# Patient Record
Sex: FEMALE | Race: OTHER | NOT HISPANIC OR LATINO | URBAN - METROPOLITAN AREA
[De-identification: names, ages, dates, MRNs, and addresses within clinical notes are randomized per-mention and may not be internally consistent; named-entity substitution may affect disease eponyms.]

---

## 2021-12-11 ENCOUNTER — INPATIENT (INPATIENT)
Facility: HOSPITAL | Age: 86
LOS: 2 days | Discharge: HOME | DRG: 687 | End: 2021-12-14
Attending: INTERNAL MEDICINE | Admitting: FAMILY MEDICINE
Payer: MEDICARE

## 2021-12-11 VITALS — WEIGHT: 139.99 LBS

## 2021-12-11 DIAGNOSIS — R31.9 HEMATURIA, UNSPECIFIED: ICD-10-CM

## 2021-12-11 LAB
ALBUMIN SERPL ELPH-MCNC: 3.3 G/DL — SIGNIFICANT CHANGE UP (ref 3.3–5)
ALP SERPL-CCNC: 54 U/L — SIGNIFICANT CHANGE UP (ref 40–120)
ALT FLD-CCNC: 34 U/L — SIGNIFICANT CHANGE UP (ref 12–78)
ANION GAP SERPL CALC-SCNC: 4 MMOL/L — LOW (ref 5–17)
APPEARANCE UR: ABNORMAL
AST SERPL-CCNC: 34 U/L — SIGNIFICANT CHANGE UP (ref 15–37)
BASOPHILS # BLD AUTO: 0.03 K/UL — SIGNIFICANT CHANGE UP (ref 0–0.2)
BASOPHILS # BLD AUTO: 0.04 K/UL — SIGNIFICANT CHANGE UP (ref 0–0.2)
BASOPHILS NFR BLD AUTO: 0.4 % — SIGNIFICANT CHANGE UP (ref 0–2)
BASOPHILS NFR BLD AUTO: 0.6 % — SIGNIFICANT CHANGE UP (ref 0–2)
BILIRUB SERPL-MCNC: 0.3 MG/DL — SIGNIFICANT CHANGE UP (ref 0.2–1.2)
BILIRUB UR-MCNC: NEGATIVE — SIGNIFICANT CHANGE UP
BUN SERPL-MCNC: 21 MG/DL — SIGNIFICANT CHANGE UP (ref 7–23)
CALCIUM SERPL-MCNC: 8.9 MG/DL — SIGNIFICANT CHANGE UP (ref 8.5–10.1)
CHLORIDE SERPL-SCNC: 110 MMOL/L — HIGH (ref 96–108)
CO2 SERPL-SCNC: 28 MMOL/L — SIGNIFICANT CHANGE UP (ref 22–31)
COLOR SPEC: ABNORMAL
CREAT SERPL-MCNC: 1.03 MG/DL — SIGNIFICANT CHANGE UP (ref 0.5–1.3)
DIFF PNL FLD: ABNORMAL
EOSINOPHIL # BLD AUTO: 0.06 K/UL — SIGNIFICANT CHANGE UP (ref 0–0.5)
EOSINOPHIL # BLD AUTO: 0.09 K/UL — SIGNIFICANT CHANGE UP (ref 0–0.5)
EOSINOPHIL NFR BLD AUTO: 0.8 % — SIGNIFICANT CHANGE UP (ref 0–6)
EOSINOPHIL NFR BLD AUTO: 1.3 % — SIGNIFICANT CHANGE UP (ref 0–6)
GLUCOSE SERPL-MCNC: 109 MG/DL — HIGH (ref 70–99)
GLUCOSE UR QL: NEGATIVE MG/DL — SIGNIFICANT CHANGE UP
HCT VFR BLD CALC: 23.6 % — LOW (ref 34.5–45)
HCT VFR BLD CALC: 26.3 % — LOW (ref 34.5–45)
HGB BLD-MCNC: 7.8 G/DL — LOW (ref 11.5–15.5)
HGB BLD-MCNC: 8.5 G/DL — LOW (ref 11.5–15.5)
IMM GRANULOCYTES NFR BLD AUTO: 0.4 % — SIGNIFICANT CHANGE UP (ref 0–1.5)
IMM GRANULOCYTES NFR BLD AUTO: 0.5 % — SIGNIFICANT CHANGE UP (ref 0–1.5)
KETONES UR-MCNC: ABNORMAL
LEUKOCYTE ESTERASE UR-ACNC: ABNORMAL
LYMPHOCYTES # BLD AUTO: 0.75 K/UL — LOW (ref 1–3.3)
LYMPHOCYTES # BLD AUTO: 1.29 K/UL — SIGNIFICANT CHANGE UP (ref 1–3.3)
LYMPHOCYTES # BLD AUTO: 10.8 % — LOW (ref 13–44)
LYMPHOCYTES # BLD AUTO: 17 % — SIGNIFICANT CHANGE UP (ref 13–44)
MCHC RBC-ENTMCNC: 30.1 PG — SIGNIFICANT CHANGE UP (ref 27–34)
MCHC RBC-ENTMCNC: 30.5 PG — SIGNIFICANT CHANGE UP (ref 27–34)
MCHC RBC-ENTMCNC: 32.3 GM/DL — SIGNIFICANT CHANGE UP (ref 32–36)
MCHC RBC-ENTMCNC: 33.1 GM/DL — SIGNIFICANT CHANGE UP (ref 32–36)
MCV RBC AUTO: 92.2 FL — SIGNIFICANT CHANGE UP (ref 80–100)
MCV RBC AUTO: 93.3 FL — SIGNIFICANT CHANGE UP (ref 80–100)
MONOCYTES # BLD AUTO: 0.54 K/UL — SIGNIFICANT CHANGE UP (ref 0–0.9)
MONOCYTES # BLD AUTO: 0.67 K/UL — SIGNIFICANT CHANGE UP (ref 0–0.9)
MONOCYTES NFR BLD AUTO: 7.1 % — SIGNIFICANT CHANGE UP (ref 2–14)
MONOCYTES NFR BLD AUTO: 9.7 % — SIGNIFICANT CHANGE UP (ref 2–14)
NEUTROPHILS # BLD AUTO: 5.35 K/UL — SIGNIFICANT CHANGE UP (ref 1.8–7.4)
NEUTROPHILS # BLD AUTO: 5.63 K/UL — SIGNIFICANT CHANGE UP (ref 1.8–7.4)
NEUTROPHILS NFR BLD AUTO: 74.2 % — SIGNIFICANT CHANGE UP (ref 43–77)
NEUTROPHILS NFR BLD AUTO: 77.2 % — HIGH (ref 43–77)
NITRITE UR-MCNC: NEGATIVE — SIGNIFICANT CHANGE UP
PH UR: 6.5 — SIGNIFICANT CHANGE UP (ref 5–8)
PLATELET # BLD AUTO: 365 K/UL — SIGNIFICANT CHANGE UP (ref 150–400)
PLATELET # BLD AUTO: 397 K/UL — SIGNIFICANT CHANGE UP (ref 150–400)
POTASSIUM SERPL-MCNC: 4.5 MMOL/L — SIGNIFICANT CHANGE UP (ref 3.5–5.3)
POTASSIUM SERPL-SCNC: 4.5 MMOL/L — SIGNIFICANT CHANGE UP (ref 3.5–5.3)
PROT SERPL-MCNC: 7 GM/DL — SIGNIFICANT CHANGE UP (ref 6–8.3)
PROT UR-MCNC: 500 MG/DL
RBC # BLD: 2.56 M/UL — LOW (ref 3.8–5.2)
RBC # BLD: 2.82 M/UL — LOW (ref 3.8–5.2)
RBC # FLD: 14.5 % — SIGNIFICANT CHANGE UP (ref 10.3–14.5)
RBC # FLD: 14.6 % — HIGH (ref 10.3–14.5)
SARS-COV-2 RNA SPEC QL NAA+PROBE: SIGNIFICANT CHANGE UP
SODIUM SERPL-SCNC: 142 MMOL/L — SIGNIFICANT CHANGE UP (ref 135–145)
SP GR SPEC: 1.01 — SIGNIFICANT CHANGE UP (ref 1.01–1.02)
UROBILINOGEN FLD QL: NEGATIVE MG/DL — SIGNIFICANT CHANGE UP
WBC # BLD: 6.93 K/UL — SIGNIFICANT CHANGE UP (ref 3.8–10.5)
WBC # BLD: 7.59 K/UL — SIGNIFICANT CHANGE UP (ref 3.8–10.5)
WBC # FLD AUTO: 6.93 K/UL — SIGNIFICANT CHANGE UP (ref 3.8–10.5)
WBC # FLD AUTO: 7.59 K/UL — SIGNIFICANT CHANGE UP (ref 3.8–10.5)

## 2021-12-11 PROCEDURE — 74177 CT ABD & PELVIS W/CONTRAST: CPT | Mod: 26,MA

## 2021-12-11 PROCEDURE — 36430 TRANSFUSION BLD/BLD COMPNT: CPT

## 2021-12-11 PROCEDURE — 85018 HEMOGLOBIN: CPT

## 2021-12-11 PROCEDURE — 85027 COMPLETE CBC AUTOMATED: CPT

## 2021-12-11 PROCEDURE — 85025 COMPLETE CBC W/AUTO DIFF WBC: CPT

## 2021-12-11 PROCEDURE — 84443 ASSAY THYROID STIM HORMONE: CPT

## 2021-12-11 PROCEDURE — 82746 ASSAY OF FOLIC ACID SERUM: CPT

## 2021-12-11 PROCEDURE — 82728 ASSAY OF FERRITIN: CPT

## 2021-12-11 PROCEDURE — 99285 EMERGENCY DEPT VISIT HI MDM: CPT

## 2021-12-11 PROCEDURE — 83550 IRON BINDING TEST: CPT

## 2021-12-11 PROCEDURE — 84436 ASSAY OF TOTAL THYROXINE: CPT

## 2021-12-11 PROCEDURE — 85014 HEMATOCRIT: CPT

## 2021-12-11 PROCEDURE — 80048 BASIC METABOLIC PNL TOTAL CA: CPT

## 2021-12-11 PROCEDURE — P9016: CPT

## 2021-12-11 PROCEDURE — 82607 VITAMIN B-12: CPT

## 2021-12-11 PROCEDURE — 83540 ASSAY OF IRON: CPT

## 2021-12-11 PROCEDURE — 99223 1ST HOSP IP/OBS HIGH 75: CPT

## 2021-12-11 PROCEDURE — 36415 COLL VENOUS BLD VENIPUNCTURE: CPT

## 2021-12-11 RX ORDER — ATORVASTATIN CALCIUM 80 MG/1
10 TABLET, FILM COATED ORAL AT BEDTIME
Refills: 0 | Status: DISCONTINUED | OUTPATIENT
Start: 2021-12-11 | End: 2021-12-14

## 2021-12-11 RX ORDER — SODIUM CHLORIDE 9 MG/ML
1000 INJECTION INTRAMUSCULAR; INTRAVENOUS; SUBCUTANEOUS ONCE
Refills: 0 | Status: COMPLETED | OUTPATIENT
Start: 2021-12-11 | End: 2021-12-11

## 2021-12-11 RX ORDER — LANOLIN ALCOHOL/MO/W.PET/CERES
3 CREAM (GRAM) TOPICAL AT BEDTIME
Refills: 0 | Status: DISCONTINUED | OUTPATIENT
Start: 2021-12-11 | End: 2021-12-14

## 2021-12-11 RX ORDER — ONDANSETRON 8 MG/1
4 TABLET, FILM COATED ORAL EVERY 8 HOURS
Refills: 0 | Status: DISCONTINUED | OUTPATIENT
Start: 2021-12-11 | End: 2021-12-14

## 2021-12-11 RX ORDER — ACETAMINOPHEN 500 MG
650 TABLET ORAL EVERY 6 HOURS
Refills: 0 | Status: DISCONTINUED | OUTPATIENT
Start: 2021-12-11 | End: 2021-12-14

## 2021-12-11 RX ADMIN — SODIUM CHLORIDE 1000 MILLILITER(S): 9 INJECTION INTRAMUSCULAR; INTRAVENOUS; SUBCUTANEOUS at 12:39

## 2021-12-11 RX ADMIN — SODIUM CHLORIDE 1000 MILLILITER(S): 9 INJECTION INTRAMUSCULAR; INTRAVENOUS; SUBCUTANEOUS at 11:38

## 2021-12-11 RX ADMIN — ATORVASTATIN CALCIUM 10 MILLIGRAM(S): 80 TABLET, FILM COATED ORAL at 21:34

## 2021-12-11 NOTE — ED STATDOCS - PROGRESS NOTE DETAILS
Justin Aldrich for Dr. Genao: 89 y/o F with a PMHx of HLD presents to the ED c/o hematuria x 9 days. Pt seen at walk in clinic 9 day sago and given Bactrim for UTI. Pt here c/o lower back pain and increased blood notes in urine with painful urination. Pt takes ASA. NO fever. No abd pain. Pt reports  L sided back pain. Pt also states that she feels very weak; here with a HR of 102.  Pt speaks Palauan; pt's son with her to help translate. Will send pt to main ED for further evaluation. Justin Aldrich for Dr. Genao: 89 y/o F with a PMHx of HLD presents to the ED c/o hematuria x 9 days. Pt seen at walk in clinic 9 day sago and given Bactrim for UTI. Pt here c/o lower back pain and increased blood notes in urine with painful urination. Pt takes ASA. NO fever. No abd pain. Pt reports  L sided back pain. Pt also states that she feels very weak; here with a HR of 102. Pt is pale.  Pt speaks American; pt's son with her to help translate. Will send pt to main ED for further evaluation.

## 2021-12-11 NOTE — H&P ADULT - ASSESSMENT
89 yo female with Hx. of HLD, Macular degeneration who presented in the ED for gross hematuria. The patient symptoms started 9 days ago and she was treated with Bactrim. The patient symptoms got worse. The patient moved up from [place] 2 years ago to live with her son because of COVID.  Family also noted a bruise on her forehead 2 days ago.    assessment Dx:                       1. Gross  hematuria                        2. Anemia secondary to acute blood loss                        3. Bladder neoplasm                        4. Macular degeneration                       5. HLD     Plan:    admit to medicine               medications: as per med rec. Received blood transfusion in ED                VTEP: Venodynes               Labs: cbc, bmp ordered for the AM                Radiology: CT abd /pelvis done see result               Cardiac diagnostics: EKG               Consults: Urology                 Advance Directive: full code,

## 2021-12-11 NOTE — ED PROVIDER NOTE - RESPIRATORY, MLM
Aaliyah is asked if she feels patient is a danger to herself being at home alone. Per Aaliyah, no, she doesn't feel it has come to that at this point and she declined off for information for APS referral.  
Per Niece by marriage, hermelindo, patient leaves the house and returns and doesn't remember where she went. She will say \"i don't remember where I went, but I remember how to get there.\"    She is having trouble remembering the names of places. This started 1 year ago. She avoids the family when they bring up concerns. She doesn't see them for long periods of time so they can't notice the gradual change. Now when they see her after a while big changes are noted.     Her friends have been calling Hermelindo and Lan letting them know that patient is acting different and they are concerned. The patient herself, stated several times that she doesn't feel herself, yet she has been hesitant to set up an appointment.       Lan her nephew brought her to  to draw up papers for if she is deemed incompetent. They will bring the papers to the visit to place in her chart.    She tells her family she doesn't take any medications. She has been driving.       Hermelindo will have patient sign oral disclosure at visit so she is added on for future communication with office. Note placed on appt for MD to review this message before the appt.            
Please call Aaliyah regarding an upcoming appointment for Daria.  She wanted to relay some information to you regarding some memory issues patient is having and how they are concerned for her safety.  Her  Lan will be on the appointment with Daria on the 19th  #[phone]  
Breath sounds clear and equal bilaterally.

## 2021-12-11 NOTE — ED PROVIDER NOTE - OBJECTIVE STATEMENT
89 y/o female with a PMHx of HLD presents to the ED c/o hematuria x9 days. Pt seen at walk in clinic 9 days ago and given Bactrim for UTI. Pt with increasing hematuria, burning with urination, abd cramping and left flank pain. Pt takes ASA. Denies fever, black stools. No other complaints at this time. Pt speaks Swedish; pt's son with her to help translate. 89 y/o female with a PMHx of HLD presents to the ED c/o hematuria x9 days. Pt seen at walk in clinic 9 days ago and given Bactrim for UTI. Pt with increasing hematuria, burning with urination, abd cramping and left flank pain. Pt takes ASA. Denies fever. No other complaints at this time. Pt speaks Jordanian; pt's son with her to help translate. 89 y/o female with a PMHx of HLD presents to the ED c/o hematuria x9 days. Pt seen at walk in clinic 9 days ago and given Bactrim for UTI. Pt with increasing hematuria, burning with urination, abd cramping and left flank pain. Pt takes ASA. Denies fever. No other complaints at this time. Pt speaks Macedonian; pt's son with her to help translate as requested by patient.

## 2021-12-11 NOTE — H&P ADULT - HISTORY OF PRESENT ILLNESS
HPI: The patient is a 89 yo female with Hx. of HLD, Macular degeneration who presented in the ED for gross hematuria. The patient symptoms started 9 days ago and she was treated with Bactrim. The patient symptoms got worse. The patient moved up from [place] 2 years ago to live with her son because of COVID.  Family also noted a bruise on her forehead 2 days ago.     PMHx:  HLD, Macular degeneration     PSHx: denies surgeries    Family Hx: both parents lived ot [address],     Social Hx.: not smoking, no alcohol use    ROS:   Eyes: no changes in vision    ENT/Mouth: no changes    Cardiovascular: no chest pain    Respiratory: no SOB    Gastrointestinal: no diarrhea, no nausea, no vomiting    Genitourinary: no dysuria    Breast: no pain    Musculoskeletal: no pain    Integumentary: no itching    Neurological: No Headache, no tremor,    Psychiatric: no suicidal ideations    Endocrine: no excessive thirst,     Hematologic/Lymphatic: no swollen glands    Allergic/Immunologic: no itching      Physical Exam: Vital Signs Last 24 Hrs  T(C): 36.7 (11 Dec 2021 19:28), Max: 36.8 (11 Dec 2021 10:48)  T(F): 98 (11 Dec 2021 19:28), Max: 98.2 (11 Dec 2021 10:48)  HR: 76 (11 Dec 2021 19:28) (76 - 102)  BP: 133/63 (11 Dec 2021 19:28) (121/66 - 135/60)  BP(mean): 75 (11 Dec 2021 16:15) (75 - 83)  RR: 20 (11 Dec 2021 19:28) (14 - 20)  SpO2: 99% (11 Dec 2021 19:28) (97% - 100%)        HEENT: PRRL EOMI    MOUTH/TEETH/GUMS: Clear    NECK: no JVD    LUNGS: Clear    HEART: S1,S2 RR    ABDOMEN: soft nontender    EXTREMITIES:  no pedal edema    MUSCULOSKELETAL: no joint swelling     NEURO: no tremor, no focal signs.    SKIN: no rash, small fading ecchymosis on forehead.     : CVA negative,     Lab:                       8.5    6.93  )-----------( 397      ( 11 Dec 2021 11:09 )             26.3       12-11    142  |  110<H>  |  21  ----------------------------<  109<H>  4.5   |  28  |  1.03    Ca    8.9      11 Dec 2021 11:09    TPro  7.0  /  Alb  3.3  /  TBili  0.3  /  DBili  x   /  AST  34  /  ALT  34  /  AlkPhos  54  12-11      CT abd/pelvis 2 polypoid bladder masses suspicious for multifocal bladder neoplasm

## 2021-12-11 NOTE — ED ADULT NURSE NOTE - CHIEF COMPLAINT QUOTE
Hematuria x9 days. Seen at a walk in clinic 9 days ago and given Bactrim for UTI. Now complaining of lower back pain and increased blood noted in urine with painful urination. Patient speaks primarily Maltese, emergency contact are her sons Errol [phone] and Fei [phone].

## 2021-12-11 NOTE — ED ADULT TRIAGE NOTE - CHIEF COMPLAINT QUOTE
Hematuria x9 days. Seen at a walk in clinic 9 days ago and given Bactrim for UTI. Now complaining of lower back pain and increased blood noted in urine with painful urination. Patient speaks primarily Arabic, emergency contact are her sons Errol [phone] and Fei [phone].

## 2021-12-11 NOTE — ED PROVIDER NOTE - PROGRESS NOTE DETAILS
Justin Case for attending Dr. Peña: Pt with a hemoglobin of 8.5. Pt with no old for comparison. Pending workup. Pt will likely need to be admitted. Justin Case for attending Dr. Peña: Spoke with urology. Aware of pt and will consult. Pt will be admitted to medicine for anemia. Will transfuse 1 unit of PRBCs. Pt and son at bedside agree with plan. Mariana SAN: all results d/w patient and son at bedside and copy of all labs, ct scan provided. Endorsed to Dr. Fernandes for admission.

## 2021-12-12 LAB
ANION GAP SERPL CALC-SCNC: 3 MMOL/L — LOW (ref 5–17)
BUN SERPL-MCNC: 15 MG/DL — SIGNIFICANT CHANGE UP (ref 7–23)
CALCIUM SERPL-MCNC: 8.5 MG/DL — SIGNIFICANT CHANGE UP (ref 8.5–10.1)
CHLORIDE SERPL-SCNC: 110 MMOL/L — HIGH (ref 96–108)
CO2 SERPL-SCNC: 28 MMOL/L — SIGNIFICANT CHANGE UP (ref 22–31)
CREAT SERPL-MCNC: 0.76 MG/DL — SIGNIFICANT CHANGE UP (ref 0.5–1.3)
GLUCOSE SERPL-MCNC: 120 MG/DL — HIGH (ref 70–99)
HCT VFR BLD CALC: 27.9 % — LOW (ref 34.5–45)
HGB BLD-MCNC: 9.2 G/DL — LOW (ref 11.5–15.5)
MCHC RBC-ENTMCNC: 30 PG — SIGNIFICANT CHANGE UP (ref 27–34)
MCHC RBC-ENTMCNC: 33 GM/DL — SIGNIFICANT CHANGE UP (ref 32–36)
MCV RBC AUTO: 90.9 FL — SIGNIFICANT CHANGE UP (ref 80–100)
PLATELET # BLD AUTO: 332 K/UL — SIGNIFICANT CHANGE UP (ref 150–400)
POTASSIUM SERPL-MCNC: 3.8 MMOL/L — SIGNIFICANT CHANGE UP (ref 3.5–5.3)
POTASSIUM SERPL-SCNC: 3.8 MMOL/L — SIGNIFICANT CHANGE UP (ref 3.5–5.3)
RBC # BLD: 3.07 M/UL — LOW (ref 3.8–5.2)
RBC # FLD: 15.9 % — HIGH (ref 10.3–14.5)
SODIUM SERPL-SCNC: 141 MMOL/L — SIGNIFICANT CHANGE UP (ref 135–145)
WBC # BLD: 6.67 K/UL — SIGNIFICANT CHANGE UP (ref 3.8–10.5)
WBC # FLD AUTO: 6.67 K/UL — SIGNIFICANT CHANGE UP (ref 3.8–10.5)

## 2021-12-12 PROCEDURE — 99232 SBSQ HOSP IP/OBS MODERATE 35: CPT

## 2021-12-12 PROCEDURE — 99222 1ST HOSP IP/OBS MODERATE 55: CPT

## 2021-12-12 RX ORDER — SODIUM CHLORIDE 9 MG/ML
500 INJECTION INTRAMUSCULAR; INTRAVENOUS; SUBCUTANEOUS ONCE
Refills: 0 | Status: COMPLETED | OUTPATIENT
Start: 2021-12-12 | End: 2021-12-12

## 2021-12-12 RX ADMIN — SODIUM CHLORIDE 500 MILLILITER(S): 9 INJECTION INTRAMUSCULAR; INTRAVENOUS; SUBCUTANEOUS at 16:21

## 2021-12-12 NOTE — PATIENT PROFILE ADULT - PATIENT/CAREGIVER ACCEPTED INTERPRETER SERVICES
yes Pt. unable to understand  in Libyan or Cook Islander  [id number]. RN speaking limited Libyan with patient./yes

## 2021-12-12 NOTE — CONSULT NOTE ADULT - SUBJECTIVE AND OBJECTIVE BOX
hpHPI: The patient is a 89 yo female with Hx. of HLD, Macular degeneration who presented in the ED for gross hematuria. The patient symptoms started 9 days ago and she was treated with Bactrim. The patient symptoms got worse. The patient moved up from [place] 2 years ago to live with her son because of COVID.  Family also noted a bruise on her forehead 2 days ago.     PMHx:  HLD, Macular degeneration     PSHx: denies surgeries    Family Hx: both parents lived ot [address],     Social Hx.: not smoking, no alcohol use    ROS: negative for all except what is noted in HPI    Vital Signs Last 24 Hrs  T(C): 35.9 (12 Dec 2021 08:10), Max: 36.8 (11 Dec 2021 23:46)  T(F): 96.7 (12 Dec 2021 08:10), Max: 98.2 (11 Dec 2021 23:46)  HR: 87 (12 Dec 2021 08:10) (72 - 104)  BP: 112/53 (12 Dec 2021 08:10) (112/53 - 135/60)  BP(mean): 84 (11 Dec 2021 23:46) (75 - 84)  RR: 18 (12 Dec 2021 08:10) (14 - 20)  SpO2: 96% (12 Dec 2021 08:10) (94% - 100%)    NAD  RRR  no increased WOB  ABD S NT ND  no SP tenderness  no CVA tenderness                          9.2    6.67  )-----------( 332      ( 12 Dec 2021 10:32 )             27.9     12-12    141  |  110<H>  |  15  ----------------------------<  120<H>  3.8   |  28  |  0.76    Ca    8.5      12 Dec 2021 10:32    TPro  7.0  /  Alb  3.3  /  TBili  0.3  /  DBili  x   /  AST  34  /  ALT  34  /  AlkPhos  54  12-11    Urinalysis Basic - ( 11 Dec 2021 11:09 )    Color: Red / Appearance: Slightly Turbid / S.010 / pH: x  Gluc: x / Ketone: Trace  / Bili: Negative / Urobili: Negative mg/dL   Blood: x / Protein: 500 mg/dL / Nitrite: Negative   Leuk Esterase: Trace / RBC: >50 /HPF / WBC 0-2   Sq Epi: x / Non Sq Epi: Negative / Bacteria: Negative    < from: CT Abdomen and Pelvis w/ IV Cont (21 @ 12:59) >  BLADDER: 2 polypoid soft tissue nodules;  *A nodule along the left posterior wall measures 3.7 x 2.7 x 2.0 cm  *A nodule at the base of the bladder measures 1.2 x 1.4 x 1.8 cm.  Bilateral bladder diverticula noted.    < end of copied text >

## 2021-12-12 NOTE — PROGRESS NOTE ADULT - ASSESSMENT
ASSESSMENT & PLAN    #Gross Hematuria  #Bladder Mass x 2 of uncertain Behavior + Bladder diverticuli  #Acute blood loss anemia due to above (possible nutritional component too)  -Admit to medicine  -Hgb low. Has not required transfusion. Hgb now stable. Continue to monitor  -Iron/Ferriting/B12/Folate level  -CT with bladder mass x 2 NOS.   -Urology consult  -IVF as needed for fluid balance    #Possible OSORIO vs CKD II/IIIA.   -Cr on admission 1.01 improved to 0.76 with IVF  -Continue to monitor Cr and Electrolytes  -IVF as needed for fluid balance    #HLD. Continue Statin    DVT Prophylaxis: SCDs because acute bleed + anemia ASSESSMENT & PLAN    #Gross Hematuria  #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 s/p 1 unit of pRBCs 12/11. Hgb now stable. Continue to monitor  -Iron/Ferriting/B12/Folate level  -CT with bladder mass x 2 NOS.   -Urology consult  -IVF as needed for fluid balance    #Possible OSORIO vs CKD II/IIIA.   -Cr on admission 1.01 improved to 0.76 with IVF  -Continue to monitor Cr and Electrolytes  -IVF as needed for fluid balance    #HLD. Continue Statin    DVT Prophylaxis: SCDs because acute bleed + anemia

## 2021-12-12 NOTE — CONSULT NOTE ADULT - ASSESSMENT
89 yo F with gross hematuria, suspected bladder masses on CT. Call placed to discuss GOC with son Fei. message left. For now, trend CBC. Will consider CBI if H/H drifts back down.

## 2021-12-12 NOTE — PATIENT PROFILE ADULT - FALL HARM RISK - HARM RISK INTERVENTIONS
Assistance with ambulation/Assistance OOB with selected safe patient handling equipment/Communicate Risk of Fall with Harm to all staff/Monitor gait and stability/Reinforce activity limits and safety measures with patient and family/Sit up slowly, dangle for a short time, stand at bedside before walking/Tailored Fall Risk Interventions/Visual Cue: Yellow wristband and red socks/Bed in lowest position, wheels locked, appropriate side rails in place/Call bell, personal items and telephone in reach/Instruct patient to call for assistance before getting out of bed or chair/Non-slip footwear when patient is out of bed/[place] to call system/Physically safe environment - no spills, clutter or unnecessary equipment/Purposeful Proactive Rounding/Room/bathroom lighting operational, light cord in reach

## 2021-12-12 NOTE — PROGRESS NOTE ADULT - SUBJECTIVE AND OBJECTIVE BOX
CHIEF COMPLAINT: Hematuria    SUBJECTIVE: Hematuria improved. No pain or dysuria. Denies fever, chills, chest pain, nausea, vomiting, flank pain,.     SIGNIFICANT INTERVAL EVENTS/OVERNIGHT EVENTS: None    Review of Systems: 14 Point review of systems reviewed and reported as negative unless otherwise stated in HPI    Main language is Iraqi. Patient able to answer simple questions about symptoms. Translation and additional history obtained with sons at bedside.     FROM H&P:  "The patient is a 89 yo female with Hx. of HLD, Macular degeneration who presented in the ED for gross hematuria. The patient symptoms started 9 days ago and she was treated with Bactrim. The patient symptoms got worse. The patient moved up from [place] 2 years ago to live with her son because of COVID.  Family also noted a bruise on her forehead 2 days ago. "    PHYSICAL EXAM:    T(C): 35.9 (12-12-21 @ 08:10), Max: 36.8 (12-11-21 @ 23:46)  HR: 87 (12-12-21 @ 08:10) (72 - 104)  BP: 112/53 (12-12-21 @ 08:10) (112/53 - 135/60)  RR: 18 (12-12-21 @ 08:10) (14 - 20)  SpO2: 96% (12-12-21 @ 08:10) (94% - 100%)    General: AAOx3; NAD; Frail appearing  Head: AT/NC  ENT: Moist Mucous Membranes; No Injury  Eyes: EOMI; PERRL  Neck: Non-tender; No JVD  CVS: RRR, S1&S2, No murmur, No edema  Respiratory: Lungs CTA B/L; Normal Respiratory Effort  Abdomen/GI: Soft, non-tender, non-distended, no guarding, no rebound, normal bowel sounds  : No bladder distention  Extremites: No cyanosis, No clubbing, No edema  MSK: No CVA tenderness, Normal ROM, No injury  Neuro: AAOx3, CNII-XII grossly intact, non-focal  Psych: Appropriate, Cooperative,  Skin: Clean, Dry and Intact      LABS:                          9.2    6.67  )-----------( 332      ( 12 Dec 2021 10:32 )             27.9     12-12    141  |  110<H>  |  15  ----------------------------<  120<H>  3.8   |  28  |  0.76    Ca    8.5      12 Dec 2021 10:32    TPro  7.0  /  Alb  3.3  /  TBili  0.3  /  DBili  x   /  AST  34  /  ALT  34  /  AlkPhos  54  12-11    COVID-19 PCR: NotDetec (11 Dec 2021 11:09)    CAPILLARY BLOOD GLUCOSE    INR: 0.98:Urinalysis (12.11.21 @ 11:09)   Glucose Qualitative, Urine: Negative mg/dL   Blood, Urine: Large   pH Urine: 6.5   Color: Red   Urine Appearance: Slightly Turbid   Bilirubin: Negative   Ketone - Urine: Trace   Specific Gravity: 1.010   Protein, Urine: 500 mg/dL   Urobilinogen: Negative mg/dL   Nitrite: Negative   Leukocyte Esterase Concentration: Trace           RADIOLOGY:  < from: CT Abdomen and Pelvis w/ IV Cont (12.11.21 @ 12:59) >  BLADDER: 2 polypoid soft tissue nodules;  *A nodule along the left posterior wall measures 3.7 x 2.7 x 2.0 cm  *A nodule at the base of the bladder measures 1.2 x 1.4 x 1.8 cm.  Bilateral bladder diverticula noted.  REPRODUCTIVE ORGANS: Uterus and adnexa within normal limits.    < end of copied text >            I personally reviewed labs, imaging, orders and vitals.    Discussed case with:  [X]RN  [X][place]/JERARDO  [X]Patient  [X]Family  []Specialist:        MEDICATIONS  (STANDING):  atorvastatin 10 milliGRAM(s) Oral at bedtime    MEDICATIONS  (PRN):  acetaminophen     Tablet .. 650 milliGRAM(s) Oral every 6 hours PRN Temp greater or equal to 38C (100.4F), Mild Pain (1 - 3)  aluminum hydroxide/magnesium hydroxide/simethicone Suspension 30 milliLiter(s) Oral every 4 hours PRN Dyspepsia  melatonin 3 milliGRAM(s) Oral at bedtime PRN Insomnia  ondansetron Injectable 4 milliGRAM(s) IV Push every 8 hours PRN Nausea and/or Vomiting

## 2021-12-13 LAB
ANION GAP SERPL CALC-SCNC: 5 MMOL/L — SIGNIFICANT CHANGE UP (ref 5–17)
BASOPHILS # BLD AUTO: 0.04 K/UL — SIGNIFICANT CHANGE UP (ref 0–0.2)
BASOPHILS NFR BLD AUTO: 0.5 % — SIGNIFICANT CHANGE UP (ref 0–2)
BUN SERPL-MCNC: 19 MG/DL — SIGNIFICANT CHANGE UP (ref 7–23)
CALCIUM SERPL-MCNC: 8.6 MG/DL — SIGNIFICANT CHANGE UP (ref 8.5–10.1)
CHLORIDE SERPL-SCNC: 113 MMOL/L — HIGH (ref 96–108)
CO2 SERPL-SCNC: 26 MMOL/L — SIGNIFICANT CHANGE UP (ref 22–31)
CREAT SERPL-MCNC: 0.63 MG/DL — SIGNIFICANT CHANGE UP (ref 0.5–1.3)
CULTURE RESULTS: SIGNIFICANT CHANGE UP
EOSINOPHIL # BLD AUTO: 0.06 K/UL — SIGNIFICANT CHANGE UP (ref 0–0.5)
EOSINOPHIL NFR BLD AUTO: 0.8 % — SIGNIFICANT CHANGE UP (ref 0–6)
GLUCOSE SERPL-MCNC: 104 MG/DL — HIGH (ref 70–99)
HCT VFR BLD CALC: 28.7 % — LOW (ref 34.5–45)
HGB BLD-MCNC: 9.2 G/DL — LOW (ref 11.5–15.5)
IMM GRANULOCYTES NFR BLD AUTO: 0.5 % — SIGNIFICANT CHANGE UP (ref 0–1.5)
LYMPHOCYTES # BLD AUTO: 1.13 K/UL — SIGNIFICANT CHANGE UP (ref 1–3.3)
LYMPHOCYTES # BLD AUTO: 15 % — SIGNIFICANT CHANGE UP (ref 13–44)
MCHC RBC-ENTMCNC: 29.3 PG — SIGNIFICANT CHANGE UP (ref 27–34)
MCHC RBC-ENTMCNC: 32.1 GM/DL — SIGNIFICANT CHANGE UP (ref 32–36)
MCV RBC AUTO: 91.4 FL — SIGNIFICANT CHANGE UP (ref 80–100)
MONOCYTES # BLD AUTO: 0.79 K/UL — SIGNIFICANT CHANGE UP (ref 0–0.9)
MONOCYTES NFR BLD AUTO: 10.5 % — SIGNIFICANT CHANGE UP (ref 2–14)
NEUTROPHILS # BLD AUTO: 5.46 K/UL — SIGNIFICANT CHANGE UP (ref 1.8–7.4)
NEUTROPHILS NFR BLD AUTO: 72.7 % — SIGNIFICANT CHANGE UP (ref 43–77)
PLATELET # BLD AUTO: 330 K/UL — SIGNIFICANT CHANGE UP (ref 150–400)
POTASSIUM SERPL-MCNC: 4.2 MMOL/L — SIGNIFICANT CHANGE UP (ref 3.5–5.3)
POTASSIUM SERPL-SCNC: 4.2 MMOL/L — SIGNIFICANT CHANGE UP (ref 3.5–5.3)
RBC # BLD: 3.14 M/UL — LOW (ref 3.8–5.2)
RBC # FLD: 15.7 % — HIGH (ref 10.3–14.5)
SODIUM SERPL-SCNC: 144 MMOL/L — SIGNIFICANT CHANGE UP (ref 135–145)
SPECIMEN SOURCE: SIGNIFICANT CHANGE UP
T4 AB SER-ACNC: 5.4 UG/DL — SIGNIFICANT CHANGE UP (ref 4.6–12)
WBC # BLD: 7.52 K/UL — SIGNIFICANT CHANGE UP (ref 3.8–10.5)
WBC # FLD AUTO: 7.52 K/UL — SIGNIFICANT CHANGE UP (ref 3.8–10.5)

## 2021-12-13 PROCEDURE — 99232 SBSQ HOSP IP/OBS MODERATE 35: CPT

## 2021-12-13 RX ORDER — LEVOTHYROXINE SODIUM 125 MCG
100 TABLET ORAL DAILY
Refills: 0 | Status: DISCONTINUED | OUTPATIENT
Start: 2021-12-13 | End: 2021-12-14

## 2021-12-13 NOTE — PROGRESS NOTE ADULT - SUBJECTIVE AND OBJECTIVE BOX
Cheif complaints and Diagnosis: bladder mass/ gross hematuria    Subjective: no complaints      REVIEW OF SYSTEMS:    CONSTITUTIONAL: No weakness, fevers or chills  EYES/ENT: No visual changes;  No vertigo or throat pain   NECK: No pain or stiffness  RESPIRATORY: No cough, wheezing, hemoptysis; No shortness of breath  CARDIOVASCULAR: No chest pain or palpitations  GASTROINTESTINAL: No abdominal or epigastric pain. No nausea, vomiting, or hematemesis; No diarrhea or constipation. No melena or hematochezia.  GENITOURINARY: No dysuria, frequency or hematuria  NEUROLOGICAL: No numbness or weakness  SKIN: No itching, burning, rashes, or lesions   All other review of systems is negative unless indicated above      Vital Signs Last 24 Hrs  T(C): 36.4 (13 Dec 2021 08:46), Max: 36.4 (12 Dec 2021 15:39)  T(F): 97.5 (13 Dec 2021 08:46), Max: 97.6 (12 Dec 2021 15:39)  HR: 109 (13 Dec 2021 08:46) (88 - 109)  BP: 121/68 (13 Dec 2021 08:46) (99/58 - 164/97)  BP(mean): --  RR: 17 (13 Dec 2021 08:46) (17 - 17)  SpO2: 96% (13 Dec 2021 08:46) (93% - 100%)    HEENT:   pupils equal and reactive, EOMI, no oropharyngeal lesions, erythema, exudates, oral thrush    NECK:   supple, no carotid bruits, no palpable lymph nodes, no thyromegaly    CV:  +S1, +S2, regular, no murmurs or rubs    RESP:   lungs clear to auscultation bilaterally, no wheezing, rales, rhonchi, good air entry bilaterally    BREAST:  not examined    GI:  abdomen soft, non-tender, non-distended, normal BS, no bruits, no abdominal masses, no palpable masses    RECTAL:  not examined    :  not examined    MSK:   normal muscle tone, no atrophy, no rigidity, no contractions    EXT:   no clubbing, no cyanosis, no edema, no calf pain, swelling or erythema    VASCULAR:  pulses equal and symmetric in the upper and lower extremities    NEURO:  AAOX3, no focal neurological deficits, follows all commands, able to move extremities spontaneously    SKIN:  no ulcers, lesions or rashes    MEDICATIONS  (STANDING):  atorvastatin 10 milliGRAM(s) Oral at bedtime    MEDICATIONS  (PRN):  acetaminophen     Tablet .. 650 milliGRAM(s) Oral every 6 hours PRN Temp greater or equal to 38C (100.4F), Mild Pain (1 - 3)  aluminum hydroxide/magnesium hydroxide/simethicone Suspension 30 milliLiter(s) Oral every 4 hours PRN Dyspepsia  melatonin 3 milliGRAM(s) Oral at bedtime PRN Insomnia  ondansetron Injectable 4 milliGRAM(s) IV Push every 8 hours PRN Nausea and/or Vomiting      Urinalysis Basic - ( 11 Dec 2021 11:09 )    Color: Red / Appearance: Slightly Turbid / S.010 / pH: x  Gluc: x / Ketone: Trace  / Bili: Negative / Urobili: Negative mg/dL   Blood: x / Protein: 500 mg/dL / Nitrite: Negative   Leuk Esterase: Trace / RBC: >50 /HPF / WBC 0-2   Sq Epi: x / Non Sq Epi: Negative / Bacteria: Negative    12 Dec 2021 10:32    141    |  110    |  15     ----------------------------<  120    3.8     |  28     |  0.76     Ca    8.5        12 Dec 2021 10:32    TPro  7.0    /  Alb  3.3    /  TBili  0.3    /  DBili  x      /  AST  34     /  ALT  34     /  AlkPhos  54     11 Dec 2021 11:09  LIVER FUNCTIONS - ( 11 Dec 2021 11:09 )  Alb: 3.3 g/dL / Pro: 7.0 gm/dL / ALK PHOS: 54 U/L / ALT: 34 U/L / AST: 34 U/L / GGT: x         PT/INR - ( 11 Dec 2021 12:33 )   PT: 11.5 sec;   INR: 0.98 ratio         PTT - ( 11 Dec 2021 12:33 )  PTT:29.5 secCBC Full  -  ( 13 Dec 2021 09:30 )  WBC Count : 7.52 K/uL  Hemoglobin : 9.2 g/dL  Hematocrit : 28.7 %  Platelet Count - Automated : 330 K/uL  Mean Cell Volume : 91.4 fl  Mean Cell Hemoglobin : 29.3 pg  Mean Cell Hemoglobin Concentration : 32.1 gm/dL  Auto Neutrophil # : 5.46 K/uL  Auto Lymphocyte # : 1.13 K/uL  Auto Monocyte # : 0.79 K/uL  Auto Eosinophil # : 0.06 K/uL  Auto Basophil # : 0.04 K/uL  Auto Neutrophil % : 72.7 %  Auto Lymphocyte % : 15.0 %  Auto Monocyte % : 10.5 %  Auto Eosinophil % : 0.8 %  Auto Basophil % : 0.5 %            PT/INR - ( 11 Dec 2021 12:33 )   PT: 11.5 sec;   INR: 0.98 ratio         PTT - ( 11 Dec 2021 12:33 )  PTT:29.5 sec              Assessment and Plan:      #Gross Hematuria  #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 s/p 1 unit of pRBCs . Hgb now stable. Continue to monitor  -Iron/Ferriting/B12/Folate level  -CT with bladder mass x 2 NOS.   -Urology consult appreciated; Miranda mays left message for son to discuss plan of action.   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   -Cr on admission 1.01 improved to 0.76 with IVF  -Continue to monitor Cr and Electrolytes  -IVF as needed for fluid balance    #HLD. Continue Statin    DVT Prophylaxis: SCDs because acute bleed + anemia       Cheif complaints and Diagnosis: bladder mass/ gross hematuria    Subjective: no complaints      REVIEW OF SYSTEMS:    CONSTITUTIONAL: No weakness, fevers or chills  EYES/ENT: No visual changes;  No vertigo or throat pain   NECK: No pain or stiffness  RESPIRATORY: No cough, wheezing, hemoptysis; No shortness of breath  CARDIOVASCULAR: No chest pain or palpitations  GASTROINTESTINAL: No abdominal or epigastric pain. No nausea, vomiting, or hematemesis; No diarrhea or constipation. No melena or hematochezia.  GENITOURINARY: No dysuria, frequency or hematuria  NEUROLOGICAL: No numbness or weakness  SKIN: No itching, burning, rashes, or lesions   All other review of systems is negative unless indicated above      Vital Signs Last 24 Hrs  T(C): 36.4 (13 Dec 2021 08:46), Max: 36.4 (12 Dec 2021 15:39)  T(F): 97.5 (13 Dec 2021 08:46), Max: 97.6 (12 Dec 2021 15:39)  HR: 109 (13 Dec 2021 08:46) (88 - 109)  BP: 121/68 (13 Dec 2021 08:46) (99/58 - 164/97)  BP(mean): --  RR: 17 (13 Dec 2021 08:46) (17 - 17)  SpO2: 96% (13 Dec 2021 08:46) (93% - 100%)    HEENT:   pupils equal and reactive, EOMI, no oropharyngeal lesions, erythema, exudates, oral thrush    NECK:   supple, no carotid bruits, no palpable lymph nodes, no thyromegaly    CV:  +S1, +S2, regular, no murmurs or rubs    RESP:   lungs clear to auscultation bilaterally, no wheezing, rales, rhonchi, good air entry bilaterally    BREAST:  not examined    GI:  abdomen soft, non-tender, non-distended, normal BS, no bruits, no abdominal masses, no palpable masses    RECTAL:  not examined    :  not examined    MSK:   normal muscle tone, no atrophy, no rigidity, no contractions    EXT:   no clubbing, no cyanosis, no edema, no calf pain, swelling or erythema    VASCULAR:  pulses equal and symmetric in the upper and lower extremities    NEURO:  AAOX3, no focal neurological deficits, follows all commands, able to move extremities spontaneously    SKIN:  no ulcers, lesions or rashes    MEDICATIONS  (STANDING):  atorvastatin 10 milliGRAM(s) Oral at bedtime    MEDICATIONS  (PRN):  acetaminophen     Tablet .. 650 milliGRAM(s) Oral every 6 hours PRN Temp greater or equal to 38C (100.4F), Mild Pain (1 - 3)  aluminum hydroxide/magnesium hydroxide/simethicone Suspension 30 milliLiter(s) Oral every 4 hours PRN Dyspepsia  melatonin 3 milliGRAM(s) Oral at bedtime PRN Insomnia  ondansetron Injectable 4 milliGRAM(s) IV Push every 8 hours PRN Nausea and/or Vomiting      Urinalysis Basic - ( 11 Dec 2021 11:09 )    Color: Red / Appearance: Slightly Turbid / S.010 / pH: x  Gluc: x / Ketone: Trace  / Bili: Negative / Urobili: Negative mg/dL   Blood: x / Protein: 500 mg/dL / Nitrite: Negative   Leuk Esterase: Trace / RBC: >50 /HPF / WBC 0-2   Sq Epi: x / Non Sq Epi: Negative / Bacteria: Negative    12 Dec 2021 10:32    141    |  110    |  15     ----------------------------<  120    3.8     |  28     |  0.76     Ca    8.5        12 Dec 2021 10:32    TPro  7.0    /  Alb  3.3    /  TBili  0.3    /  DBili  x      /  AST  34     /  ALT  34     /  AlkPhos  54     11 Dec 2021 11:09  LIVER FUNCTIONS - ( 11 Dec 2021 11:09 )  Alb: 3.3 g/dL / Pro: 7.0 gm/dL / ALK PHOS: 54 U/L / ALT: 34 U/L / AST: 34 U/L / GGT: x         PT/INR - ( 11 Dec 2021 12:33 )   PT: 11.5 sec;   INR: 0.98 ratio         PTT - ( 11 Dec 2021 12:33 )  PTT:29.5 secCBC Full  -  ( 13 Dec 2021 09:30 )  WBC Count : 7.52 K/uL  Hemoglobin : 9.2 g/dL  Hematocrit : 28.7 %  Platelet Count - Automated : 330 K/uL  Mean Cell Volume : 91.4 fl  Mean Cell Hemoglobin : 29.3 pg  Mean Cell Hemoglobin Concentration : 32.1 gm/dL  Auto Neutrophil # : 5.46 K/uL  Auto Lymphocyte # : 1.13 K/uL  Auto Monocyte # : 0.79 K/uL  Auto Eosinophil # : 0.06 K/uL  Auto Basophil # : 0.04 K/uL  Auto Neutrophil % : 72.7 %  Auto Lymphocyte % : 15.0 %  Auto Monocyte % : 10.5 %  Auto Eosinophil % : 0.8 %  Auto Basophil % : 0.5 %            PT/INR - ( 11 Dec 2021 12:33 )   PT: 11.5 sec;   INR: 0.98 ratio         PTT - ( 11 Dec 2021 12:33 )  PTT:29.5 sec              Assessment and Plan:      #Gross Hematuria  #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 s/p 1 unit of pRBCs . Hgb now stable. Continue to monitor  -Iron/Ferriting/B12/Folate level  -CT with bladder mass x 2 NOS.   -Urology consult appreciated; Miranda mays left message for son to discuss plan of action.   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   -Cr on admission 1.01 improved to 0.76 with IVF  -Continue to monitor Cr and Electrolytes  -IVF as needed for fluid balance    #Hypothyroidism  -new onset  -start levothyroxine    #HLD. Continue Statin    DVT Prophylaxis: SCDs because acute bleed + anemia

## 2021-12-13 NOTE — PROGRESS NOTE ADULT - SUBJECTIVE AND OBJECTIVE BOX
Pt seen and examined without complaints. Feeling better. No other complaints.     Vital Signs Last 24 Hrs  T(C): 36.4 (13 Dec 2021 08:46), Max: 36.4 (12 Dec 2021 15:39)  T(F): 97.5 (13 Dec 2021 08:46), Max: 97.6 (12 Dec 2021 15:39)  HR: 109 (13 Dec 2021 08:46) (88 - 109)  BP: 121/68 (13 Dec 2021 08:46) (99/58 - 164/97)  BP(mean): --  RR: 17 (13 Dec 2021 08:46) (17 - 17)  SpO2: 96% (13 Dec 2021 08:46) (93% - 100%)    I&O's Summary      Physical Exam  Gen: NAD,  Abd: Soft, NT, ND, No bladder palp  Back: No CVA tenderness  : Pt voiding but not saved                          9.2    7.52  )-----------( 330      ( 13 Dec 2021 09:30 )             28.7       12-13    144  |  113<H>  |  19  ----------------------------<  104<H>  4.2   |  26  |  0.63    Ca    8.6      13 Dec 2021 09:30        A/P: 89y Female with hematuria  Encourage po fluids  Monitor urine  Strict I&O's  Ck labs  Above discussed with Dr. Fonseca

## 2021-12-14 ENCOUNTER — TRANSCRIPTION ENCOUNTER (OUTPATIENT)
Age: 86
End: 2021-12-14

## 2021-12-14 VITALS
DIASTOLIC BLOOD PRESSURE: 62 MMHG | SYSTOLIC BLOOD PRESSURE: 102 MMHG | HEART RATE: 93 BPM | OXYGEN SATURATION: 98 % | RESPIRATION RATE: 20 BRPM | TEMPERATURE: 97 F

## 2021-12-14 PROCEDURE — 99239 HOSP IP/OBS DSCHRG MGMT >30: CPT

## 2021-12-14 RX ORDER — LEVOTHYROXINE SODIUM 125 MCG
1 TABLET ORAL
Qty: 30 | Refills: 0
Start: 2021-12-14 | End: 2022-01-12

## 2021-12-14 RX ADMIN — Medication 100 MICROGRAM(S): at 06:13

## 2021-12-14 NOTE — DISCHARGE NOTE PROVIDER - NSDCMRMEDTOKEN_GEN_ALL_CORE_FT
lovastatin 20 mg oral tablet: 1 tab(s) orally once a day   levothyroxine 100 mcg (0.1 mg) oral tablet: 1 tab(s) orally once a day  lovastatin 20 mg oral tablet: 1 tab(s) orally once a day

## 2021-12-14 NOTE — DISCHARGE NOTE PROVIDER - CARE PROVIDER_API CALL
Derrick Fonseca)  Urology  [address], [address] [place] [address]  Phone: [phone]  Fax: [phone]  Follow Up Time:

## 2021-12-14 NOTE — DISCHARGE NOTE PROVIDER - HOSPITAL COURSE
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 s/p 1 unit of pRBCs 12/11. Hgb now stable. Continue to monitor  -Iron/Ferriting/B12/Folate level  -CT with bladder mass x 2 NOS.   -Urology consult appreciated; Miranda mays left message for son to discuss plan of action.   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   -Cr on admission 1.01 improved to 0.76 with IVF  -Continue to monitor Cr and Electrolytes  -IVF as needed for fluid balance    #Hypothyroidism  -new onset  -start levothyroxine

## 2021-12-14 NOTE — DISCHARGE NOTE PROVIDER - CARE PROVIDERS DIRECT ADDRESSES
[email or web address]

## 2021-12-14 NOTE — DISCHARGE NOTE NURSING/CASE MANAGEMENT/SOCIAL WORK - PATIENT PORTAL LINK FT
You can access the FollowMyHealth Patient Portal offered by [facility] by registering at the following website: [email or web address]. By joining [employer]’s FollowMyHealth portal, you will also be able to view your health information using other applications (apps) compatible with our system.

## 2021-12-14 NOTE — DISCHARGE NOTE PROVIDER - NSDCCPCAREPLAN_GEN_ALL_CORE_FT
PRINCIPAL DISCHARGE DIAGNOSIS  Diagnosis: Hematuria  Assessment and Plan of Treatment:   *possibly due to bladder mass  *Hemaglobin is currently 9.2 and stable  *Please follow up outpatient with Dr. Derrick Fonseca in his offic for further workup regarding the bladder mass and bleeding.  You will likley need a Cystoscopy outpatient to look inside and get biopsy of mass.   *please see a primary care physician with in one week of discharge to repeat the blood work, and ensure that the hemglobin his still stable.        SECONDARY DISCHARGE DIAGNOSES  Diagnosis: Malignant neoplasm of bladder  Assessment and Plan of Treatment:   *Same as above    Diagnosis: Hypothyroidism, adult  Assessment and Plan of Treatment:   *Your TSH is currently 8.41  *Start Levothyroxine for your thyroid.   *Repeat thyroid blood work in 3-4 weeks to ensure the medication dose is working for you.   *Hypothyroidism can cause fatigue, weightgain and other concerns.     PRINCIPAL DISCHARGE DIAGNOSIS  Diagnosis: Hematuria  Assessment and Plan of Treatment:   *possibly due to bladder mass  *Hemaglobin is currently 9.2 and stable  *Please follow up outpatient with Dr. Derrick Fonseca in his offic for further workup regarding the bladder mass and bleeding.  You will likley need a Cystoscopy outpatient to look inside and get biopsy of mass.   *please see a primary care physician with in one week of discharge to repeat the blood work, and ensure that the hemglobin his still stable.        SECONDARY DISCHARGE DIAGNOSES  Diagnosis: Malignant neoplasm of bladder  Assessment and Plan of Treatment: *CT scan of pelvis shows suspicion for bladder mass, which may be causing her bleeding.    Diagnosis: Hypothyroidism, adult  Assessment and Plan of Treatment:   *Your TSH is currently 8.41  *Start Levothyroxine for your thyroid.   *Repeat thyroid blood work in 3-4 weeks to ensure the medication dose is working for you.   *Hypothyroidism can cause fatigue, weightgain and other concerns.

## 2021-12-15 ENCOUNTER — NON-APPOINTMENT (OUTPATIENT)
Age: 86
End: 2021-12-15

## 2021-12-15 PROBLEM — E78.5 HYPERLIPIDEMIA, UNSPECIFIED: Chronic | Status: ACTIVE | Noted: 2021-12-11

## 2021-12-15 PROBLEM — Z00.00 ENCOUNTER FOR PREVENTIVE HEALTH EXAMINATION: Status: ACTIVE | Noted: 2021-12-15

## 2021-12-17 ENCOUNTER — APPOINTMENT (OUTPATIENT)
Dept: UROLOGY | Facility: CLINIC | Age: 86
End: 2021-12-17
Payer: MEDICARE

## 2021-12-17 VITALS
DIASTOLIC BLOOD PRESSURE: 66 MMHG | OXYGEN SATURATION: 98 % | BODY MASS INDEX: 19.7 KG/M2 | HEIGHT: 68 IN | WEIGHT: 130 LBS | SYSTOLIC BLOOD PRESSURE: 107 MMHG | HEART RATE: 104 BPM

## 2021-12-17 DIAGNOSIS — Z85.51 PERSONAL HISTORY OF MALIGNANT NEOPLASM OF BLADDER: ICD-10-CM

## 2021-12-17 DIAGNOSIS — Z78.9 OTHER SPECIFIED HEALTH STATUS: ICD-10-CM

## 2021-12-17 PROCEDURE — 99214 OFFICE O/P EST MOD 30 MIN: CPT

## 2021-12-18 PROBLEM — Z85.51 HISTORY OF BLADDER CANCER: Status: RESOLVED | Noted: 2021-12-18 | Resolved: 2021-12-18

## 2021-12-18 PROBLEM — Z78.9 NO FAMILY HISTORY OF HYPERTENSION: Status: ACTIVE | Noted: 2021-12-18

## 2021-12-18 NOTE — END OF VISIT
[FreeTextEntry3] : I had a long discussion with the family who translated for the patient.  I reviewed what would happen if we took a completely aggressive tach versus a palliative tract.  I indicated that hematuria was a benign way to and once life that she is in [place] no distress and the aggressive tach would leave her with multiple on imagined suffering.  I gave them the number of several palliation and hospice groups and they will consider these directions and let me know what they would like to do

## 2021-12-18 NOTE — PHYSICAL EXAM
[General Appearance - Well Developed] : well developed [General Appearance - Well Nourished] : well nourished [Normal Appearance] : normal appearance [Well Groomed] : well groomed [General Appearance - In No Acute Distress] : no acute distress [FreeTextEntry1] : Debility of extreme age [Edema] : no peripheral edema [Respiration, Rhythm And Depth] : normal respiratory rhythm and effort [Exaggerated Use Of Accessory Muscles For Inspiration] : no accessory muscle use [Abdomen Soft] : soft [Abdomen Tenderness] : non-tender [Costovertebral Angle Tenderness] : no ~M costovertebral angle tenderness [Urinary Bladder Findings] : the bladder was normal on palpation [Normal Station and Gait] : the gait and station were normal for the patient's age [] : no rash [No Focal Deficits] : no focal deficits [Oriented To Time, Place, And Person] : oriented to person, place, and time [Mood] : the mood was normal [Affect] : the affect was normal [Not Anxious] : not anxious [No Palpable Adenopathy] : no palpable adenopathy

## 2021-12-18 NOTE — HISTORY OF PRESENT ILLNESS
[FreeTextEntry1] : This patient was recently hospitalized for hematuria.  She has 2 large bladder tumors and has gross hematuria she is accompanied here today by her 2 sons and daughter-in-law.  She is wheelchair-bound and does not understand English very well.  She is in no distress Family

## 2021-12-20 ENCOUNTER — TRANSCRIPTION ENCOUNTER (OUTPATIENT)
Age: 86
End: 2021-12-20

## 2021-12-20 ENCOUNTER — NON-APPOINTMENT (OUTPATIENT)
Age: 86
End: 2021-12-20

## 2021-12-20 ENCOUNTER — APPOINTMENT (OUTPATIENT)
Dept: INTERNAL MEDICINE | Facility: CLINIC | Age: 86
End: 2021-12-20
Payer: COMMERCIAL

## 2021-12-20 VITALS
SYSTOLIC BLOOD PRESSURE: 94 MMHG | TEMPERATURE: 97.8 F | HEIGHT: 67 IN | HEART RATE: 94 BPM | RESPIRATION RATE: 16 BRPM | DIASTOLIC BLOOD PRESSURE: 50 MMHG | OXYGEN SATURATION: 99 %

## 2021-12-20 DIAGNOSIS — R31.0 GROSS HEMATURIA: ICD-10-CM

## 2021-12-20 DIAGNOSIS — N18.31 CHRONIC KIDNEY DISEASE, STAGE 3A: ICD-10-CM

## 2021-12-20 DIAGNOSIS — N17.9 ACUTE KIDNEY FAILURE, UNSPECIFIED: ICD-10-CM

## 2021-12-20 DIAGNOSIS — N32.3 DIVERTICULUM OF BLADDER: ICD-10-CM

## 2021-12-20 DIAGNOSIS — Z79.82 LONG TERM (CURRENT) USE OF ASPIRIN: ICD-10-CM

## 2021-12-20 DIAGNOSIS — Z76.89 PERSONS ENCOUNTERING HEALTH SERVICES IN OTHER SPECIFIED CIRCUMSTANCES: ICD-10-CM

## 2021-12-20 DIAGNOSIS — Z87.440 PERSONAL HISTORY OF URINARY (TRACT) INFECTIONS: ICD-10-CM

## 2021-12-20 DIAGNOSIS — H35.30 UNSPECIFIED MACULAR DEGENERATION: ICD-10-CM

## 2021-12-20 DIAGNOSIS — C67.9 MALIGNANT NEOPLASM OF BLADDER, UNSPECIFIED: ICD-10-CM

## 2021-12-20 DIAGNOSIS — D62 ACUTE POSTHEMORRHAGIC ANEMIA: ICD-10-CM

## 2021-12-20 DIAGNOSIS — E03.9 HYPOTHYROIDISM, UNSPECIFIED: ICD-10-CM

## 2021-12-20 DIAGNOSIS — E78.5 HYPERLIPIDEMIA, UNSPECIFIED: ICD-10-CM

## 2021-12-20 PROCEDURE — 99204 OFFICE O/P NEW MOD 45 MIN: CPT

## 2021-12-21 ENCOUNTER — NON-APPOINTMENT (OUTPATIENT)
Age: 86
End: 2021-12-21

## 2021-12-21 ENCOUNTER — APPOINTMENT (OUTPATIENT)
Dept: UROLOGY | Facility: CLINIC | Age: 86
End: 2021-12-21
Payer: COMMERCIAL

## 2021-12-21 VITALS — DIASTOLIC BLOOD PRESSURE: 63 MMHG | TEMPERATURE: 97.9 F | SYSTOLIC BLOOD PRESSURE: 104 MMHG | HEART RATE: 92 BPM

## 2021-12-21 VITALS — TEMPERATURE: 97.6 F

## 2021-12-21 PROBLEM — Z76.89 ENCOUNTER TO ESTABLISH CARE: Status: ACTIVE | Noted: 2021-12-21

## 2021-12-21 LAB
ALBUMIN SERPL ELPH-MCNC: 3.7 G/DL
ALP BLD-CCNC: 62 U/L
ALT SERPL-CCNC: 9 U/L
ANION GAP SERPL CALC-SCNC: 10 MMOL/L
AST SERPL-CCNC: 17 U/L
BASOPHILS # BLD AUTO: 0.04 K/UL
BASOPHILS NFR BLD AUTO: 0.6 %
BILIRUB SERPL-MCNC: 0.2 MG/DL
BUN SERPL-MCNC: 28 MG/DL
CALCIUM SERPL-MCNC: 9.6 MG/DL
CHLORIDE SERPL-SCNC: 105 MMOL/L
CO2 SERPL-SCNC: 26 MMOL/L
CREAT SERPL-MCNC: 0.85 MG/DL
EOSINOPHIL # BLD AUTO: 0.1 K/UL
EOSINOPHIL NFR BLD AUTO: 1.4 %
GLUCOSE SERPL-MCNC: 138 MG/DL
HCT VFR BLD CALC: 24.1 %
HGB BLD-MCNC: 7.6 G/DL
IMM GRANULOCYTES NFR BLD AUTO: 0.6 %
LYMPHOCYTES # BLD AUTO: 1.11 K/UL
LYMPHOCYTES NFR BLD AUTO: 15.3 %
MAN DIFF?: NORMAL
MCHC RBC-ENTMCNC: 30 PG
MCHC RBC-ENTMCNC: 31.5 GM/DL
MCV RBC AUTO: 95.3 FL
MONOCYTES # BLD AUTO: 0.8 K/UL
MONOCYTES NFR BLD AUTO: 11 %
NEUTROPHILS # BLD AUTO: 5.15 K/UL
NEUTROPHILS NFR BLD AUTO: 71.1 %
PLATELET # BLD AUTO: 216 K/UL
POTASSIUM SERPL-SCNC: 4.4 MMOL/L
PROT SERPL-MCNC: 6 G/DL
RBC # BLD: 2.53 M/UL
RBC # FLD: 15.4 %
SODIUM SERPL-SCNC: 141 MMOL/L
WBC # FLD AUTO: 7.24 K/UL

## 2021-12-21 PROCEDURE — 99214 OFFICE O/P EST MOD 30 MIN: CPT | Mod: 25

## 2021-12-21 PROCEDURE — 52000 CYSTOURETHROSCOPY: CPT

## 2021-12-21 NOTE — ASSESSMENT
[FreeTextEntry1] : Reviewed outside records on [facility] \par \par Gross hematuria:\par Bladder tumor:\par Discussed Cystoscopy findings. \par Discussed Transurethral Resection of Bladder Tumor with Intravesical Mitomycin Instillation. \par Discussed the procedure, risks and benefits of and the post operative course. \par Discussed increased risks considering her age. \par Patient and family will like to proceed. \par \par Recommended good oral hydration.\par Will get Urinalysis and Urine culture. \par Continue Cefuroxime. \par Recommended that patient report to Emergency department if condition worsens or cannot urinate. \par  \par Discussed again Hemoglobin and Hematocrit down, will need blood transfusion. Family will contact PCP. \par \par Will arrange for surgery at [facility] on 1/10/22. \par \par \par  \par \par \par

## 2021-12-21 NOTE — HISTORY OF PRESENT ILLNESS
[Post-hospitalization from ___ Hospital] : Post-hospitalization from [unfilled] Hospital [Admitted on: ___] : The patient was admitted on [unfilled] [Discharged on ___] : discharged on [unfilled] [FreeTextEntry2] : Ms. ARAVIND SHAW  is    89 year female .\par she was admitted in the hospital for Gross Hematuria, found to have Bladder Mass x 2 of uncertain Behavior + Bladder diverticula.\par she had Acute blood loss anemia.On admission her Hgb 7.8. She received  1 unit of pRBCs 12/11.\par was discharged home with Hb 9.2 to f/u with urology as out pt.

## 2021-12-21 NOTE — PHYSICAL EXAM
[General Appearance - In No Acute Distress] : no acute distress [Urethral Meatus] : the meatus of the urethra showed no abnormalities [External Female Genitalia] : normal external genitalia [Atrophy] : atrophy [Skin Color & Pigmentation] : normal skin color and pigmentation [] : no respiratory distress [Oriented To Time, Place, And Person] : oriented to person, place, and time [FreeTextEntry1] : wheel chair bound

## 2021-12-21 NOTE — HISTORY OF PRESENT ILLNESS
[FreeTextEntry1] : 89 year old female presents for gross hematuria and bladder tumor. \par Patient primarily Venezuelan speaking, with limited English. Visit conducted with help of accompanying Son who was translating. \par Recently was admitted at [facility] for not feeling well, had gross hematuria and on CT scan: Bladder tumor. Required blood transfusion. Was seen by Dr Fonseca in the Hospital. \par Saw Dr Flower few days ago for continued gross hematuria. \par Since Hospital discharge has been having off and on gross hematuria with occasional clots. \par Urinating every 2-3 hours or so during the day and nocturia 2-3 x. \par Has off and on urinary incontinence. \par Denies dysuria, lower abdominal or flank pain, nausea, vomiting, fever, chills or rigors. \par \par Per family Patient was in great health before hospitalization, went to Urgent care center with complaint of not feeling well and gross hematuria. Started on Antibiotics and sent to [facility].

## 2021-12-21 NOTE — HISTORY OF PRESENT ILLNESS
[FreeTextEntry1] : 89 year old female presents for gross hematuria and bladder tumor. \par Patient primarily Togolese speaking, with limited English. Visit conducted with help of accompanying Son who was translating. \par Recently was admitted at [facility] for not feeling well, had gross hematuria and on CT scan: Bladder tumor. Required blood transfusion. Was seen by Dr Fonseca in the Hospital. \par Saw Dr Flower few days ago for continued gross hematuria. \par Since Hospital discharge has been having off and on gross hematuria with occasional clots. \par Urinating every 2-3 hours or so during the day and nocturia 2-3 x. \par Has off and on urinary incontinence. \par Denies dysuria, lower abdominal or flank pain, nausea, vomiting, fever, chills or rigors. \par \par Per family Patient was in great health before hospitalization, went to Urgent care center with complaint of not feeling well and gross hematuria. Started on Antibiotics and sent to [facility].

## 2021-12-21 NOTE — REVIEW OF SYSTEMS
[Fatigue] : fatigue [Hematuria] : hematuria [Negative] : Neurological [Fever] : no fever [Chills] : no chills [Night Sweats] : no night sweats [Abdominal Pain] : no abdominal pain [Nausea] : no nausea [Constipation] : no constipation [Vomiting] : no vomiting [Heartburn] : no heartburn [Melena] : no melena [Incontinence] : no incontinence [FreeTextEntry9] : generalized weakness

## 2021-12-21 NOTE — ASSESSMENT
[FreeTextEntry1] : Ms. SHAW is here today as a new patient to establish care.\par She recently got discharged from the hospital.  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 Boone.  Family seems to be unhappy with the decision made by the urologist to give comfort care.\par Patient is getting weak, she continues to have blood in her urine.\par Ordered CBC and CMP.\par Prescribed iron supplement\par Referred to another urologist for second opinion.\par \par Hypothyroidism:\par Newly diagnosed in the last hospital admission.\par She was started on levothyroxine 100 mcg.\par Advised to discontinue the higher dose.\par Prescribed levothyroxine 25 mcg.\par \par Hyperlipidemia:\par  on lovastatin 20 milligrams\par \par

## 2021-12-21 NOTE — ASSESSMENT
[FreeTextEntry1] : Reviewed outside records on [employer] \par \par Gross hematuria:\par Bladder tumor:\par Discussed Cystoscopy findings. \par Discussed Transurethral Resection of Bladder Tumor with Intravesical Mitomycin Instillation. \par Discussed the procedure, risks and benefits of and the post operative course. \par Discussed increased risks considering her age. \par Patient and family will like to proceed. \par \par Recommended good oral hydration.\par Will get Urinalysis and Urine culture. \par Continue Cefuroxime. \par Recommended that patient report to Emergency department if condition worsens or cannot urinate. \par  \par Discussed again Hemoglobin and Hematocrit down, will need blood transfusion. Family will contact PCP. \par \par Will arrange for surgery at [facility] on 1/10/22. \par \par \par  \par \par \par

## 2021-12-22 ENCOUNTER — EMERGENCY (EMERGENCY)
Facility: HOSPITAL | Age: 86
LOS: 0 days | Discharge: HOME | End: 2021-12-22
Attending: STUDENT IN AN ORGANIZED HEALTH CARE EDUCATION/TRAINING PROGRAM
Payer: MEDICARE

## 2021-12-22 VITALS
OXYGEN SATURATION: 100 % | SYSTOLIC BLOOD PRESSURE: 117 MMHG | DIASTOLIC BLOOD PRESSURE: 84 MMHG | HEART RATE: 84 BPM | TEMPERATURE: 98 F | RESPIRATION RATE: 16 BRPM

## 2021-12-22 VITALS
HEART RATE: 114 BPM | SYSTOLIC BLOOD PRESSURE: 115 MMHG | OXYGEN SATURATION: 97 % | RESPIRATION RATE: 19 BRPM | DIASTOLIC BLOOD PRESSURE: 59 MMHG

## 2021-12-22 DIAGNOSIS — Z20.822 CONTACT WITH AND (SUSPECTED) EXPOSURE TO COVID-19: ICD-10-CM

## 2021-12-22 DIAGNOSIS — D64.9 ANEMIA, UNSPECIFIED: ICD-10-CM

## 2021-12-22 DIAGNOSIS — R31.9 HEMATURIA, UNSPECIFIED: ICD-10-CM

## 2021-12-22 DIAGNOSIS — E78.5 HYPERLIPIDEMIA, UNSPECIFIED: ICD-10-CM

## 2021-12-22 DIAGNOSIS — R42 DIZZINESS AND GIDDINESS: ICD-10-CM

## 2021-12-22 LAB
ALBUMIN SERPL ELPH-MCNC: 3.1 G/DL — LOW (ref 3.3–5)
ALP SERPL-CCNC: 62 U/L — SIGNIFICANT CHANGE UP (ref 40–120)
ALT FLD-CCNC: 16 U/L — SIGNIFICANT CHANGE UP (ref 12–78)
ANION GAP SERPL CALC-SCNC: 6 MMOL/L — SIGNIFICANT CHANGE UP (ref 5–17)
APPEARANCE: ABNORMAL
APTT BLD: 28.5 SEC — SIGNIFICANT CHANGE UP (ref 27.5–35.5)
AST SERPL-CCNC: 21 U/L — SIGNIFICANT CHANGE UP (ref 15–37)
BACTERIA: NEGATIVE
BASOPHILS # BLD AUTO: 0.02 K/UL — SIGNIFICANT CHANGE UP (ref 0–0.2)
BASOPHILS NFR BLD AUTO: 0.3 % — SIGNIFICANT CHANGE UP (ref 0–2)
BILIRUB SERPL-MCNC: 0.3 MG/DL — SIGNIFICANT CHANGE UP (ref 0.2–1.2)
BILIRUBIN URINE: ABNORMAL
BLOOD URINE: ABNORMAL
BUN SERPL-MCNC: 29 MG/DL — HIGH (ref 7–23)
CALCIUM SERPL-MCNC: 9.1 MG/DL — SIGNIFICANT CHANGE UP (ref 8.5–10.1)
CHLORIDE SERPL-SCNC: 106 MMOL/L — SIGNIFICANT CHANGE UP (ref 96–108)
CO2 SERPL-SCNC: 29 MMOL/L — SIGNIFICANT CHANGE UP (ref 22–31)
COLOR: ABNORMAL
CREAT SERPL-MCNC: 0.9 MG/DL — SIGNIFICANT CHANGE UP (ref 0.5–1.3)
EOSINOPHIL # BLD AUTO: 0.03 K/UL — SIGNIFICANT CHANGE UP (ref 0–0.5)
EOSINOPHIL NFR BLD AUTO: 0.4 % — SIGNIFICANT CHANGE UP (ref 0–6)
GLUCOSE QUALITATIVE U: NEGATIVE
GLUCOSE SERPL-MCNC: 118 MG/DL — HIGH (ref 70–99)
HCT VFR BLD CALC: 24.2 % — LOW (ref 34.5–45)
HGB BLD-MCNC: 7.4 G/DL — LOW (ref 11.5–15.5)
HYALINE CASTS: 9 /LPF
IMM GRANULOCYTES NFR BLD AUTO: 0.7 % — SIGNIFICANT CHANGE UP (ref 0–1.5)
INR BLD: 1.07 RATIO — SIGNIFICANT CHANGE UP (ref 0.88–1.16)
KETONES URINE: NORMAL
LEUKOCYTE ESTERASE URINE: NEGATIVE
LYMPHOCYTES # BLD AUTO: 0.67 K/UL — LOW (ref 1–3.3)
LYMPHOCYTES # BLD AUTO: 9.3 % — LOW (ref 13–44)
MCHC RBC-ENTMCNC: 29.2 PG — SIGNIFICANT CHANGE UP (ref 27–34)
MCHC RBC-ENTMCNC: 30.6 GM/DL — LOW (ref 32–36)
MCV RBC AUTO: 95.7 FL — SIGNIFICANT CHANGE UP (ref 80–100)
MICROSCOPIC-UA: NORMAL
MONOCYTES # BLD AUTO: 0.73 K/UL — SIGNIFICANT CHANGE UP (ref 0–0.9)
MONOCYTES NFR BLD AUTO: 10.2 % — SIGNIFICANT CHANGE UP (ref 2–14)
NEUTROPHILS # BLD AUTO: 5.68 K/UL — SIGNIFICANT CHANGE UP (ref 1.8–7.4)
NEUTROPHILS NFR BLD AUTO: 79.1 % — HIGH (ref 43–77)
NITRITE URINE: NEGATIVE
PH URINE: 8
PLATELET # BLD AUTO: 277 K/UL — SIGNIFICANT CHANGE UP (ref 150–400)
POTASSIUM SERPL-MCNC: 4 MMOL/L — SIGNIFICANT CHANGE UP (ref 3.5–5.3)
POTASSIUM SERPL-SCNC: 4 MMOL/L — SIGNIFICANT CHANGE UP (ref 3.5–5.3)
PROT SERPL-MCNC: 6.7 GM/DL — SIGNIFICANT CHANGE UP (ref 6–8.3)
PROTEIN URINE: ABNORMAL
PROTHROM AB SERPL-ACNC: 12.4 SEC — SIGNIFICANT CHANGE UP (ref 10.6–13.6)
RBC # BLD: 2.53 M/UL — LOW (ref 3.8–5.2)
RBC # FLD: 15.3 % — HIGH (ref 10.3–14.5)
RED BLOOD CELLS URINE: >720 /HPF
SARS-COV-2 RNA SPEC QL NAA+PROBE: SIGNIFICANT CHANGE UP
SODIUM SERPL-SCNC: 141 MMOL/L — SIGNIFICANT CHANGE UP (ref 135–145)
SPECIFIC GRAVITY URINE: 1.02
SQUAMOUS EPITHELIAL CELLS: 0 /HPF
TRIPLE PHOSPHATE CRYSTALS: ABNORMAL
UROBILINOGEN URINE: NORMAL
WBC # BLD: 7.18 K/UL — SIGNIFICANT CHANGE UP (ref 3.8–10.5)
WBC # FLD AUTO: 7.18 K/UL — SIGNIFICANT CHANGE UP (ref 3.8–10.5)
WHITE BLOOD CELLS URINE: 0 /HPF

## 2021-12-22 PROCEDURE — 99291 CRITICAL CARE FIRST HOUR: CPT

## 2021-12-22 PROCEDURE — 86901 BLOOD TYPING SEROLOGIC RH(D): CPT

## 2021-12-22 PROCEDURE — 86850 RBC ANTIBODY SCREEN: CPT

## 2021-12-22 PROCEDURE — 80053 COMPREHEN METABOLIC PANEL: CPT

## 2021-12-22 PROCEDURE — 99291 CRITICAL CARE FIRST HOUR: CPT | Mod: 25

## 2021-12-22 PROCEDURE — U0005: CPT

## 2021-12-22 PROCEDURE — P9016: CPT

## 2021-12-22 PROCEDURE — 86923 COMPATIBILITY TEST ELECTRIC: CPT

## 2021-12-22 PROCEDURE — 36415 COLL VENOUS BLD VENIPUNCTURE: CPT

## 2021-12-22 PROCEDURE — 85025 COMPLETE CBC W/AUTO DIFF WBC: CPT

## 2021-12-22 PROCEDURE — 85730 THROMBOPLASTIN TIME PARTIAL: CPT

## 2021-12-22 PROCEDURE — 85610 PROTHROMBIN TIME: CPT

## 2021-12-22 PROCEDURE — 36430 TRANSFUSION BLD/BLD COMPNT: CPT

## 2021-12-22 PROCEDURE — U0003: CPT

## 2021-12-22 PROCEDURE — 86900 BLOOD TYPING SEROLOGIC ABO: CPT

## 2021-12-22 NOTE — ED PROVIDER NOTE - OBJECTIVE STATEMENT
89 y/o female with a PMHx of HLD presents to the ED regarding abnormal labs. Pt reports 2 weeks ago she developed hematuria, was seen at urgent care, was found to have a UTI and started on Bactrim. Pt was seen in ED on 12/11 for hematuria, was found to have a new bladder mass and was admitted for urology and blood transfusion. Pt presents today for low H&H. +hematuria, +dizziness. No other complaints at this time. Mauritanian  ID#: [id number].

## 2021-12-22 NOTE — ED ADULT NURSE NOTE - OBJECTIVE STATEMENT
Pt presents to er with complaints of low hgb, pt states she feels dizzy at this time, pt pale, denies chest pain, sob at this time, instructed to speak with pt's son Fei, pt's safety maintained with stretcher in lowest position. Pt is Albanian speaking pacific -[id number]

## 2021-12-22 NOTE — ED PROVIDER NOTE - PATIENT PORTAL LINK FT
You can access the FollowMyHealth Patient Portal offered by [facility] by registering at the following website: [email or web address]. By joining [employer]’s FollowMyHealth portal, you will also be able to view your health information using other applications (apps) compatible with our system.

## 2021-12-22 NOTE — ED ADULT NURSE REASSESSMENT NOTE - NS ED NURSE REASSESS COMMENT FT1
Received report from [facility]I Hector. Pt resting comfortably, resp. even and unlabored. Blood transfusion continues to infuse as per order. No noted transfusion reactions at this time. VS as noted. NSR on the CM. in NAD.

## 2021-12-22 NOTE — ED ADULT NURSE REASSESSMENT NOTE - NS ED NURSE REASSESS COMMENT FT1
Blood transfusion completed at 2053. No noted transfusion reactions noted at this time. Resp. even and unlabored. Denies any complaints. VS as noted. NSR on the CM. in NAD.

## 2021-12-22 NOTE — ED ADULT TRIAGE NOTE - CHIEF COMPLAINT QUOTE
sent to ED for low H/H in need of a blood transfusion. Fei(son) [phone], Errol(son) [phone]

## 2021-12-22 NOTE — ED ADULT NURSE REASSESSMENT NOTE - NS ED NURSE REASSESS COMMENT FT1
Pt with diagnoses of bladder ca, pt was having hematuria and has been transfused in the past, plan for d/c home after transfusion to son [phone]

## 2021-12-22 NOTE — ED ADULT NURSE NOTE - NSIMPLEMENTINTERV_GEN_ALL_ED
Implemented All Universal Safety Interventions:  [place] to call system. Call bell, personal items and telephone within reach. Instruct patient to call for assistance. Room bathroom lighting operational. Non-slip footwear when patient is off stretcher. Physically safe environment: no spills, clutter or unnecessary equipment. Stretcher in lowest position, wheels locked, appropriate side rails in place.

## 2021-12-22 NOTE — ED PROVIDER NOTE - NSFOLLOWUPINSTRUCTIONS_ED_ALL_ED_FT
Blood Transfusion, Adult, Care After      This sheet gives you information about how to care for yourself after your procedure. Your doctor may also give you more specific instructions. If you have problems or questions, contact your doctor.      What can I expect after the procedure?  After the procedure, it is common to have:  •Bruising and soreness at the IV site.      •A fever or chills on the day of the procedure. This may be your body's response to the new blood cells received.      •A headache.        Follow these instructions at home:      Insertion site care                 •Follow instructions from your doctor about how to take care of your insertion site. This is where an IV tube was put into your vein. Make sure you:  •Wash your hands with soap and water before and after you change your bandage (dressing). If you cannot use soap and water, use hand .      •Change your bandage as told by your doctor.      •Check your insertion site every day for signs of infection. Check for:  •Redness, swelling, or pain.      •Bleeding from the site.      •Warmth.      •Pus or a bad smell.        General instructions     •Take over-the-counter and prescription medicines only as told by your doctor.      •Rest as told by your doctor.      •Go back to your normal activities as told by your doctor.      •Keep all follow-up visits as told by your doctor. This is important.        Contact a doctor if:    •You have itching or red, swollen areas of skin (hives).      •You feel worried or nervous (anxious).      •You feel weak after doing your normal activities.      •You have redness, swelling, warmth, or pain around the insertion site.      •You have blood coming from the insertion site, and the blood does not stop with pressure.      •You have pus or a bad smell coming from the insertion site.        Get help right away if:  •You have signs of a serious reaction. This may be coming from an allergy or the body's defense system (immune system). Signs include:  •Trouble breathing or shortness of breath.      •Swelling of the face or feeling warm (flushed).      •Fever or chills.      •Head, chest, or back pain.      •Dark pee (urine) or blood in the pee.      •Widespread rash.      •Fast heartbeat.      •Feeling dizzy or light-headed.        You may receive your blood transfusion in an outpatient setting. If so, you will be told whom to contact to report any reactions.    These symptoms may be an emergency. Do not wait to see if the symptoms will go away. Get medical help right away. Call your local emergency services (911 in the [place] Do not drive yourself to the hospital.       Summary    •Bruising and soreness at the IV site are common.      •Check your insertion site every day for signs of infection.      •Rest as told by your doctor. Go back to your normal activities as told by your doctor.      •Get help right away if you have signs of a serious reaction.      This information is not intended to replace advice given to you by your health care provider. Make sure you discuss any questions you have with your health care provider.

## 2021-12-22 NOTE — ED PROVIDER NOTE - PROGRESS NOTE DETAILS
Justin Case for attending Dr. Peña: Spoke to pt's son Fei. Reports pt with known bladder mass with surgery scheduled on 1/10. Pt sent by PMD for blood transfusion. Justin Case for attending Dr. Peña: Signed out to Dr. King pending blood transfusion and reeval. CC:  Transfusions completed, VSS.  Pt stable for D/C.

## 2021-12-22 NOTE — ED PROVIDER NOTE - CARDIAC, MLM
CCU Critical Care Critical Care Hospitalist Infectious Disease Nephrology CCU Neurology Normal rate, regular rhythm.  Heart sounds S1, S2.  No murmurs, rubs or gallops.

## 2021-12-22 NOTE — ED ADULT NURSE NOTE - CHIEF COMPLAINT QUOTE
sent to ED for low H/H in need of a blood transfusion. Fei(son) [phone], Errol(son) [phone]

## 2021-12-26 LAB — BACTERIA UR CULT: ABNORMAL

## 2021-12-28 ENCOUNTER — NON-APPOINTMENT (OUTPATIENT)
Age: 86
End: 2021-12-28

## 2021-12-28 ENCOUNTER — APPOINTMENT (OUTPATIENT)
Dept: INTERNAL MEDICINE | Facility: CLINIC | Age: 86
End: 2021-12-28
Payer: COMMERCIAL

## 2021-12-28 VITALS
OXYGEN SATURATION: 98 % | TEMPERATURE: 97.8 F | DIASTOLIC BLOOD PRESSURE: 50 MMHG | RESPIRATION RATE: 20 BRPM | HEIGHT: 67 IN | HEART RATE: 86 BPM | SYSTOLIC BLOOD PRESSURE: 92 MMHG

## 2021-12-28 DIAGNOSIS — R94.31 ABNORMAL ELECTROCARDIOGRAM [ECG] [EKG]: ICD-10-CM

## 2021-12-28 PROCEDURE — 99214 OFFICE O/P EST MOD 30 MIN: CPT | Mod: 25

## 2021-12-28 PROCEDURE — 36415 COLL VENOUS BLD VENIPUNCTURE: CPT

## 2021-12-28 PROCEDURE — 93000 ELECTROCARDIOGRAM COMPLETE: CPT

## 2021-12-28 RX ORDER — CEFUROXIME AXETIL 500 MG/1
500 TABLET ORAL
Qty: 14 | Refills: 0 | Status: DISCONTINUED | COMMUNITY
Start: 2021-12-21 | End: 2021-12-28

## 2021-12-29 ENCOUNTER — NON-APPOINTMENT (OUTPATIENT)
Age: 86
End: 2021-12-29

## 2021-12-29 ENCOUNTER — EMERGENCY (EMERGENCY)
Facility: HOSPITAL | Age: 86
LOS: 0 days | Discharge: HOME | End: 2021-12-30
Attending: EMERGENCY MEDICINE
Payer: MEDICARE

## 2021-12-29 VITALS — WEIGHT: 130.07 LBS | HEIGHT: 60 IN

## 2021-12-29 DIAGNOSIS — E78.5 HYPERLIPIDEMIA, UNSPECIFIED: ICD-10-CM

## 2021-12-29 DIAGNOSIS — E03.9 HYPOTHYROIDISM, UNSPECIFIED: ICD-10-CM

## 2021-12-29 DIAGNOSIS — D49.4 NEOPLASM OF UNSPECIFIED BEHAVIOR OF BLADDER: ICD-10-CM

## 2021-12-29 DIAGNOSIS — D64.9 ANEMIA, UNSPECIFIED: ICD-10-CM

## 2021-12-29 DIAGNOSIS — R31.9 HEMATURIA, UNSPECIFIED: ICD-10-CM

## 2021-12-29 DIAGNOSIS — R53.1 WEAKNESS: ICD-10-CM

## 2021-12-29 DIAGNOSIS — R53.83 OTHER FATIGUE: ICD-10-CM

## 2021-12-29 LAB
ALBUMIN SERPL ELPH-MCNC: 2.6 G/DL — LOW (ref 3.3–5)
ALBUMIN SERPL ELPH-MCNC: 3.5 G/DL
ALP BLD-CCNC: 62 U/L
ALP SERPL-CCNC: 56 U/L — SIGNIFICANT CHANGE UP (ref 40–120)
ALT FLD-CCNC: 12 U/L — SIGNIFICANT CHANGE UP (ref 12–78)
ALT SERPL-CCNC: 8 U/L
ANION GAP SERPL CALC-SCNC: 11 MMOL/L
ANION GAP SERPL CALC-SCNC: 3 MMOL/L — LOW (ref 5–17)
APTT BLD: 30.5 SEC — SIGNIFICANT CHANGE UP (ref 27.5–35.5)
AST SERPL-CCNC: 19 U/L
AST SERPL-CCNC: 19 U/L — SIGNIFICANT CHANGE UP (ref 15–37)
BASOPHILS # BLD AUTO: 0.02 K/UL — SIGNIFICANT CHANGE UP (ref 0–0.2)
BASOPHILS # BLD AUTO: 0.03 K/UL
BASOPHILS NFR BLD AUTO: 0.4 % — SIGNIFICANT CHANGE UP (ref 0–2)
BASOPHILS NFR BLD AUTO: 0.6 %
BILIRUB SERPL-MCNC: 0.2 MG/DL
BILIRUB SERPL-MCNC: 0.3 MG/DL — SIGNIFICANT CHANGE UP (ref 0.2–1.2)
BUN SERPL-MCNC: 18 MG/DL — SIGNIFICANT CHANGE UP (ref 7–23)
BUN SERPL-MCNC: 19 MG/DL
CALCIUM SERPL-MCNC: 8.8 MG/DL — SIGNIFICANT CHANGE UP (ref 8.5–10.1)
CALCIUM SERPL-MCNC: 9.4 MG/DL
CHLORIDE SERPL-SCNC: 104 MMOL/L
CHLORIDE SERPL-SCNC: 108 MMOL/L — SIGNIFICANT CHANGE UP (ref 96–108)
CO2 SERPL-SCNC: 25 MMOL/L
CO2 SERPL-SCNC: 30 MMOL/L — SIGNIFICANT CHANGE UP (ref 22–31)
CREAT SERPL-MCNC: 0.67 MG/DL — SIGNIFICANT CHANGE UP (ref 0.5–1.3)
CREAT SERPL-MCNC: 0.77 MG/DL
EOSINOPHIL # BLD AUTO: 0.11 K/UL — SIGNIFICANT CHANGE UP (ref 0–0.5)
EOSINOPHIL # BLD AUTO: 0.15 K/UL
EOSINOPHIL NFR BLD AUTO: 1.9 % — SIGNIFICANT CHANGE UP (ref 0–6)
EOSINOPHIL NFR BLD AUTO: 2.8 %
GLUCOSE SERPL-MCNC: 101 MG/DL
GLUCOSE SERPL-MCNC: 101 MG/DL — HIGH (ref 70–99)
HCT VFR BLD CALC: 21.6 % — LOW (ref 34.5–45)
HCT VFR BLD CALC: 24.5 %
HGB BLD-MCNC: 6.7 G/DL — CRITICAL LOW (ref 11.5–15.5)
HGB BLD-MCNC: 7.3 G/DL
IMM GRANULOCYTES NFR BLD AUTO: 0.7 % — SIGNIFICANT CHANGE UP (ref 0–1.5)
IMM GRANULOCYTES NFR BLD AUTO: 0.9 %
INR BLD: 1.07 RATIO — SIGNIFICANT CHANGE UP (ref 0.88–1.16)
LYMPHOCYTES # BLD AUTO: 1.09 K/UL — SIGNIFICANT CHANGE UP (ref 1–3.3)
LYMPHOCYTES # BLD AUTO: 1.1 K/UL
LYMPHOCYTES # BLD AUTO: 19.3 % — SIGNIFICANT CHANGE UP (ref 13–44)
LYMPHOCYTES NFR BLD AUTO: 20.5 %
MAN DIFF?: NORMAL
MCHC RBC-ENTMCNC: 29.1 PG
MCHC RBC-ENTMCNC: 29.8 GM/DL
MCHC RBC-ENTMCNC: 30.2 PG — SIGNIFICANT CHANGE UP (ref 27–34)
MCHC RBC-ENTMCNC: 31 GM/DL — LOW (ref 32–36)
MCV RBC AUTO: 97.3 FL — SIGNIFICANT CHANGE UP (ref 80–100)
MCV RBC AUTO: 97.6 FL
MONOCYTES # BLD AUTO: 0.57 K/UL — SIGNIFICANT CHANGE UP (ref 0–0.9)
MONOCYTES # BLD AUTO: 0.6 K/UL
MONOCYTES NFR BLD AUTO: 10.1 % — SIGNIFICANT CHANGE UP (ref 2–14)
MONOCYTES NFR BLD AUTO: 11.2 %
NEUTROPHILS # BLD AUTO: 3.43 K/UL
NEUTROPHILS # BLD AUTO: 3.83 K/UL — SIGNIFICANT CHANGE UP (ref 1.8–7.4)
NEUTROPHILS NFR BLD AUTO: 64 %
NEUTROPHILS NFR BLD AUTO: 67.6 % — SIGNIFICANT CHANGE UP (ref 43–77)
PLATELET # BLD AUTO: 254 K/UL
PLATELET # BLD AUTO: 267 K/UL — SIGNIFICANT CHANGE UP (ref 150–400)
POTASSIUM SERPL-MCNC: 4.2 MMOL/L — SIGNIFICANT CHANGE UP (ref 3.5–5.3)
POTASSIUM SERPL-SCNC: 4.2 MMOL/L — SIGNIFICANT CHANGE UP (ref 3.5–5.3)
POTASSIUM SERPL-SCNC: 4.8 MMOL/L
PROT SERPL-MCNC: 5.7 G/DL
PROT SERPL-MCNC: 5.8 GM/DL — LOW (ref 6–8.3)
PROTHROM AB SERPL-ACNC: 12.5 SEC — SIGNIFICANT CHANGE UP (ref 10.6–13.6)
RBC # BLD: 2.22 M/UL — LOW (ref 3.8–5.2)
RBC # BLD: 2.51 M/UL
RBC # FLD: 15.3 % — HIGH (ref 10.3–14.5)
RBC # FLD: 15.5 %
SODIUM SERPL-SCNC: 141 MMOL/L
SODIUM SERPL-SCNC: 141 MMOL/L — SIGNIFICANT CHANGE UP (ref 135–145)
WBC # BLD: 5.66 K/UL — SIGNIFICANT CHANGE UP (ref 3.8–10.5)
WBC # FLD AUTO: 5.36 K/UL
WBC # FLD AUTO: 5.66 K/UL — SIGNIFICANT CHANGE UP (ref 3.8–10.5)

## 2021-12-29 PROCEDURE — 86923 COMPATIBILITY TEST ELECTRIC: CPT

## 2021-12-29 PROCEDURE — 93005 ELECTROCARDIOGRAM TRACING: CPT

## 2021-12-29 PROCEDURE — 86901 BLOOD TYPING SEROLOGIC RH(D): CPT

## 2021-12-29 PROCEDURE — 86850 RBC ANTIBODY SCREEN: CPT

## 2021-12-29 PROCEDURE — 93010 ELECTROCARDIOGRAM REPORT: CPT

## 2021-12-29 PROCEDURE — 99291 CRITICAL CARE FIRST HOUR: CPT | Mod: 25

## 2021-12-29 PROCEDURE — 85025 COMPLETE CBC W/AUTO DIFF WBC: CPT

## 2021-12-29 PROCEDURE — 99291 CRITICAL CARE FIRST HOUR: CPT

## 2021-12-29 PROCEDURE — 80053 COMPREHEN METABOLIC PANEL: CPT

## 2021-12-29 PROCEDURE — 85730 THROMBOPLASTIN TIME PARTIAL: CPT

## 2021-12-29 PROCEDURE — 36415 COLL VENOUS BLD VENIPUNCTURE: CPT

## 2021-12-29 PROCEDURE — 85610 PROTHROMBIN TIME: CPT

## 2021-12-29 PROCEDURE — 36430 TRANSFUSION BLD/BLD COMPNT: CPT

## 2021-12-29 PROCEDURE — 86900 BLOOD TYPING SEROLOGIC ABO: CPT

## 2021-12-29 PROCEDURE — P9016: CPT

## 2021-12-29 RX ORDER — SODIUM CHLORIDE 9 MG/ML
3 INJECTION INTRAMUSCULAR; INTRAVENOUS; SUBCUTANEOUS ONCE
Refills: 0 | Status: COMPLETED | OUTPATIENT
Start: 2021-12-29 | End: 2021-12-29

## 2021-12-29 RX ADMIN — SODIUM CHLORIDE 3 MILLILITER(S): 9 INJECTION INTRAMUSCULAR; INTRAVENOUS; SUBCUTANEOUS at 14:58

## 2021-12-29 NOTE — ED ADULT NURSE NOTE - OBJECTIVE STATEMENT
pt presents to ed ambulatory for evaluation of weakness, fatigue and poor po intake. pt has hx of bladder ca, frequent blood transfusion, here for transfusion. in no distress, ekg done, placed on monitor

## 2021-12-29 NOTE — ED ADULT TRIAGE NOTE - CHIEF COMPLAINT QUOTE
Pt. to the ED BIB Son and sent by PCP for Blood Transfusion -- Hx of Bladder Mass and Anemia--- [phone] Chiqui/and Fei Son

## 2021-12-29 NOTE — ED PROVIDER NOTE - SKIN, MLM
Skin normal color for race, warm, dry and intact. No evidence of rash. Skin normal color for race, warm, dry and intact. No evidence of rash.  Mildly pale.

## 2021-12-29 NOTE — ED ADULT NURSE REASSESSMENT NOTE - NS ED NURSE REASSESS COMMENT FT1
pt resting comfortably, vitals stable, PRBC administering with no issues
Report received from AMI Wan. Pt resting comfortably in bed, denies any pain/ symptoms at this time, vital signs stable, continues to receive 1st unit of PRBC at this time, denies transfusion reaction symptoms at this time, diaper changed at this time, will reassess.

## 2021-12-29 NOTE — REVIEW OF SYSTEMS
[Fever] : no fever [Chills] : no chills [Fatigue] : fatigue [Chest Pain] : no chest pain [Palpitations] : no palpitations [Lower Ext Edema] : no lower extremity edema [Shortness Of Breath] : no shortness of breath [Wheezing] : no wheezing [Cough] : no cough [Dyspnea on Exertion] : no dyspnea on exertion [Abdominal Pain] : no abdominal pain [Nausea] : no nausea [Diarrhea] : diarrhea [Vomiting] : no vomiting [Skin Rash] : no skin rash [Negative] : ENT [FreeTextEntry8] : See HPI [de-identified] : See HPI

## 2021-12-29 NOTE — ED PROVIDER NOTE - PROGRESS NOTE DETAILS
Justin Torres for Dr. King. Case discussed w son Fei Garcia. Confirms bladder tumor scheduled for 1/10. OP hemoglobin 7.1 by PCP Dr. Rodriguez who called son and referred to bring her to ED for blood transfusion. Son reports pt not on ASA or AC. Not COVID vaccinated. Phone consent obtained from son for 2 units of PRBC today.

## 2021-12-29 NOTE — ASSESSMENT
[FreeTextEntry1] : \par Bladder mass/gross hematuria/blood loss anemia\par -Has required blood transfusions in the recent past\par -Appears urologic surgical procedure planned for 1/10/2022\par -She continues to have some dizziness with standing and reports weakness\par -BP on the low side today\par -EKG today shows atrial tachycardia, poor R wave progression, nonspecific T wave abnormalities\par -Check CBC and CMP today\par -If hemoglobin remains low she may need to go back to the emergency room for another transfusion\par -Given planned surgical procedure and abnormal EKG I did advise that she see cardiology for clearance prior to surgical procedure-she has been referred to cardiology.  Her son states his wife has a cardiologist and they will make appointment with her cardiologist\par \par Hypothyroidism\par -She is on levothyroxine 25 mcg daily\par \par Hyperlipidemia\par -She is on lovastatin 20 mg daily\par \par HCM:\par \par Flu shot: She refuses\par \par Pneumovax: She refuses\par \par Tdap: Approximately 2020\par \par Covid vaccine advised-she refuses\par \par Shingles vaccine: She refuses\par \par Depression screening: PHQ 2 score 112/20/2021\par \par Follow-up in 1 week with Dr. Rodriguez.  FMLA forms filled out for her son

## 2021-12-29 NOTE — ED PROVIDER NOTE - CLINICAL SUMMARY MEDICAL DECISION MAKING FREE TEXT BOX
89 y/o F history of bladder tumor causing persistent hematuria, scheduled for  surgery 1/10 w Dr. Ernie REN son to ED for blood transfusion. Pt reports generalized weakness, fatigue. PE (+) mildly pale.  Plan: Labs, anticipate 2 units PRBC. Expect dc afterwards. 89 y/o F, + bladder tumor causing persistent hematuria with resultant anemia, scheduled for  surgery 1/10/22 w/ Dr. Klein, BIB son to ED for blood transfusion after outpt Hgb 7.1. Pt reports generalized weakness, fatigue. PE (+) mildly pale.  Plan: Labs, anticipate 2 units PRBC. Expect dc afterwards.

## 2021-12-29 NOTE — ED PROVIDER NOTE - MUSCULOSKELETAL, MLM
Spine appears normal, range of motion is not limited, no muscle or joint tenderness. ROCA x 4. No focal swelling or tenderness.

## 2021-12-29 NOTE — ED PROVIDER NOTE - OBJECTIVE STATEMENT
89 y/o F with a history of bladder tumor causing persistent hematuria, scheduled for  surgery 1/10 w Dr. Ernie REN son to ED for blood transfusion. Pt reports generalized weakness, fatigue, decreased appetite. Denies CP, SOB, cough, abd pain, fever, lightheaded, near syncope, blood in stool, LOC. Pt poor historian, defers a lot of questions with " speak with son over phone." History obtained from [place]  ID #: [id number]

## 2021-12-29 NOTE — ED PROVIDER NOTE - PATIENT PORTAL LINK FT
You can access the FollowMyHealth Patient Portal offered by [facility] by registering at the following website: [email or web address]. By joining [employer]’s FollowMyHealth portal, you will also be able to view your health information using other applications (apps) compatible with our system.

## 2021-12-29 NOTE — PHYSICAL EXAM
[Normal Sclera/Conjunctiva] : normal sclera/conjunctiva [PERRL] : pupils equal round and reactive to light [Normal Oropharynx] : the oropharynx was normal [Normal] : no jugular venous distention, supple, no lymphadenopathy and the thyroid was normal and there were no nodules present [No Respiratory Distress] : no respiratory distress  [No Accessory Muscle Use] : no accessory muscle use [Clear to Auscultation] : lungs were clear to auscultation bilaterally [Regular Rhythm] : with a regular rhythm [Normal Rate] : normal rate  [Normal S1, S2] : normal S1 and S2 [No Murmur] : no murmur heard [No Edema] : there was no peripheral edema [Soft] : abdomen soft [Non Tender] : non-tender [Non-distended] : non-distended [No Masses] : no abdominal mass palpated [No HSM] : no HSM [Normal Bowel Sounds] : normal bowel sounds [No Rash] : no rash [No Focal Deficits] : no focal deficits [Normal Affect] : the affect was normal [Alert and Oriented x3] : oriented to person, place, and time [Normal Mood] : the mood was normal [de-identified] : Elderly female, sitting in wheelchair, slightly pale in appearance [de-identified] : NATHAN [de-identified] : Pleasant, answers questions appropriately

## 2021-12-29 NOTE — ED ADULT NURSE NOTE - CHIEF COMPLAINT QUOTE
Pt. to the ED BIB Son and sent by PCP for Blood Transfusion -- Hx of Bladder Mass and Anemia--- [phone] Chiqui/and Fei Son

## 2021-12-29 NOTE — HISTORY OF PRESENT ILLNESS
[de-identified] : Here today for follow-up of her anemia\par \par She has anemia due to blood loss secondary to gross hematuria from bladder mass.  She has required blood transfusions in the recent past.\par \par She is followed by urology and surgical procedure planned for 1/10/2022 for bladder mass\par \par She speaks [place]ijani and her son is here translating for her\par \par She continues to have gross hematuria.  Her son states that she is passing bright red urine with blood clots which is an ongoing issue.  He states this remains unchanged.\par \par She recently followed up with her urologist who is treating her with Levaquin for strep bovis UTI.  She has not noted any difference in gross hematuria with antibiotic\par \par She is still having some dizziness with standing and reports weakness.  No chest pain or shortness of breath or palpitations reported\par \par Her last blood transfusion was 12/22/2021 in the emergency room\par \par Her son also needs FMLA forms filled out for him as he will need to take off time from work to care for his mother

## 2021-12-29 NOTE — ED PROVIDER NOTE - CARE PLAN
Principal Discharge DX:	Symptomatic anemia  Secondary Diagnosis:	Hematuria  Secondary Diagnosis:	Bladder tumor   1

## 2021-12-30 VITALS
TEMPERATURE: 98 F | OXYGEN SATURATION: 100 % | HEART RATE: 100 BPM | DIASTOLIC BLOOD PRESSURE: 90 MMHG | RESPIRATION RATE: 16 BRPM | SYSTOLIC BLOOD PRESSURE: 146 MMHG

## 2022-01-03 ENCOUNTER — NON-APPOINTMENT (OUTPATIENT)
Age: 87
End: 2022-01-03

## 2022-01-05 ENCOUNTER — EMERGENCY (EMERGENCY)
Facility: HOSPITAL | Age: 87
LOS: 0 days | Discharge: HOME | End: 2022-01-05
Attending: FAMILY MEDICINE
Payer: MEDICARE

## 2022-01-05 ENCOUNTER — APPOINTMENT (OUTPATIENT)
Dept: INTERNAL MEDICINE | Facility: CLINIC | Age: 87
End: 2022-01-05
Payer: COMMERCIAL

## 2022-01-05 VITALS
TEMPERATURE: 97.9 F | RESPIRATION RATE: 18 BRPM | SYSTOLIC BLOOD PRESSURE: 95 MMHG | DIASTOLIC BLOOD PRESSURE: 58 MMHG | OXYGEN SATURATION: 100 % | HEART RATE: 91 BPM

## 2022-01-05 VITALS — HEIGHT: 60 IN

## 2022-01-05 VITALS
OXYGEN SATURATION: 98 % | DIASTOLIC BLOOD PRESSURE: 64 MMHG | SYSTOLIC BLOOD PRESSURE: 107 MMHG | TEMPERATURE: 98 F | HEART RATE: 80 BPM | RESPIRATION RATE: 16 BRPM

## 2022-01-05 DIAGNOSIS — R31.9 HEMATURIA, UNSPECIFIED: ICD-10-CM

## 2022-01-05 DIAGNOSIS — C67.9 MALIGNANT NEOPLASM OF BLADDER, UNSPECIFIED: ICD-10-CM

## 2022-01-05 DIAGNOSIS — D50.0 IRON DEFICIENCY ANEMIA SECONDARY TO BLOOD LOSS (CHRONIC): ICD-10-CM

## 2022-01-05 DIAGNOSIS — R53.1 WEAKNESS: ICD-10-CM

## 2022-01-05 DIAGNOSIS — Z01.818 ENCOUNTER FOR OTHER PREPROCEDURAL EXAMINATION: ICD-10-CM

## 2022-01-05 DIAGNOSIS — D62 ACUTE POSTHEMORRHAGIC ANEMIA: ICD-10-CM

## 2022-01-05 LAB
ALBUMIN SERPL ELPH-MCNC: 2.6 G/DL — LOW (ref 3.3–5)
ALP SERPL-CCNC: 55 U/L — SIGNIFICANT CHANGE UP (ref 40–120)
ALT FLD-CCNC: 12 U/L — SIGNIFICANT CHANGE UP (ref 12–78)
ANION GAP SERPL CALC-SCNC: 4 MMOL/L — LOW (ref 5–17)
AST SERPL-CCNC: 16 U/L — SIGNIFICANT CHANGE UP (ref 15–37)
BASOPHILS # BLD AUTO: 0.03 K/UL — SIGNIFICANT CHANGE UP (ref 0–0.2)
BASOPHILS NFR BLD AUTO: 0.5 % — SIGNIFICANT CHANGE UP (ref 0–2)
BILIRUB SERPL-MCNC: 0.3 MG/DL — SIGNIFICANT CHANGE UP (ref 0.2–1.2)
BUN SERPL-MCNC: 19 MG/DL — SIGNIFICANT CHANGE UP (ref 7–23)
CALCIUM SERPL-MCNC: 8.7 MG/DL — SIGNIFICANT CHANGE UP (ref 8.5–10.1)
CHLORIDE SERPL-SCNC: 111 MMOL/L — HIGH (ref 96–108)
CO2 SERPL-SCNC: 28 MMOL/L — SIGNIFICANT CHANGE UP (ref 22–31)
CREAT SERPL-MCNC: 0.61 MG/DL — SIGNIFICANT CHANGE UP (ref 0.5–1.3)
EOSINOPHIL # BLD AUTO: 0.16 K/UL — SIGNIFICANT CHANGE UP (ref 0–0.5)
EOSINOPHIL NFR BLD AUTO: 2.7 % — SIGNIFICANT CHANGE UP (ref 0–6)
GLUCOSE SERPL-MCNC: 101 MG/DL — HIGH (ref 70–99)
HCT VFR BLD CALC: 22.6 % — LOW (ref 34.5–45)
HGB BLD-MCNC: 7 G/DL — CRITICAL LOW (ref 11.5–15.5)
IMM GRANULOCYTES NFR BLD AUTO: 0.7 % — SIGNIFICANT CHANGE UP (ref 0–1.5)
LYMPHOCYTES # BLD AUTO: 1.32 K/UL — SIGNIFICANT CHANGE UP (ref 1–3.3)
LYMPHOCYTES # BLD AUTO: 22 % — SIGNIFICANT CHANGE UP (ref 13–44)
MCHC RBC-ENTMCNC: 30.6 PG — SIGNIFICANT CHANGE UP (ref 27–34)
MCHC RBC-ENTMCNC: 31 GM/DL — LOW (ref 32–36)
MCV RBC AUTO: 98.7 FL — SIGNIFICANT CHANGE UP (ref 80–100)
MONOCYTES # BLD AUTO: 0.57 K/UL — SIGNIFICANT CHANGE UP (ref 0–0.9)
MONOCYTES NFR BLD AUTO: 9.5 % — SIGNIFICANT CHANGE UP (ref 2–14)
NEUTROPHILS # BLD AUTO: 3.89 K/UL — SIGNIFICANT CHANGE UP (ref 1.8–7.4)
NEUTROPHILS NFR BLD AUTO: 64.6 % — SIGNIFICANT CHANGE UP (ref 43–77)
PLATELET # BLD AUTO: 274 K/UL — SIGNIFICANT CHANGE UP (ref 150–400)
POTASSIUM SERPL-MCNC: 4 MMOL/L — SIGNIFICANT CHANGE UP (ref 3.5–5.3)
POTASSIUM SERPL-SCNC: 4 MMOL/L — SIGNIFICANT CHANGE UP (ref 3.5–5.3)
PROT SERPL-MCNC: 5.7 GM/DL — LOW (ref 6–8.3)
RBC # BLD: 2.29 M/UL — LOW (ref 3.8–5.2)
RBC # FLD: 15 % — HIGH (ref 10.3–14.5)
SODIUM SERPL-SCNC: 143 MMOL/L — SIGNIFICANT CHANGE UP (ref 135–145)
TROPONIN I, HIGH SENSITIVITY RESULT: 11.38 NG/L — SIGNIFICANT CHANGE UP
WBC # BLD: 6.01 K/UL — SIGNIFICANT CHANGE UP (ref 3.8–10.5)
WBC # FLD AUTO: 6.01 K/UL — SIGNIFICANT CHANGE UP (ref 3.8–10.5)

## 2022-01-05 PROCEDURE — 84484 ASSAY OF TROPONIN QUANT: CPT

## 2022-01-05 PROCEDURE — 85730 THROMBOPLASTIN TIME PARTIAL: CPT

## 2022-01-05 PROCEDURE — 99285 EMERGENCY DEPT VISIT HI MDM: CPT

## 2022-01-05 PROCEDURE — 86923 COMPATIBILITY TEST ELECTRIC: CPT

## 2022-01-05 PROCEDURE — 86901 BLOOD TYPING SEROLOGIC RH(D): CPT

## 2022-01-05 PROCEDURE — 93005 ELECTROCARDIOGRAM TRACING: CPT

## 2022-01-05 PROCEDURE — P9040: CPT

## 2022-01-05 PROCEDURE — 71045 X-RAY EXAM CHEST 1 VIEW: CPT

## 2022-01-05 PROCEDURE — 99214 OFFICE O/P EST MOD 30 MIN: CPT

## 2022-01-05 PROCEDURE — 85025 COMPLETE CBC W/AUTO DIFF WBC: CPT

## 2022-01-05 PROCEDURE — 99072 ADDL SUPL MATRL&STAF TM PHE: CPT

## 2022-01-05 PROCEDURE — 71045 X-RAY EXAM CHEST 1 VIEW: CPT | Mod: 26

## 2022-01-05 PROCEDURE — 93010 ELECTROCARDIOGRAM REPORT: CPT

## 2022-01-05 PROCEDURE — 36415 COLL VENOUS BLD VENIPUNCTURE: CPT

## 2022-01-05 PROCEDURE — 99285 EMERGENCY DEPT VISIT HI MDM: CPT | Mod: 25

## 2022-01-05 PROCEDURE — 85610 PROTHROMBIN TIME: CPT

## 2022-01-05 PROCEDURE — 86850 RBC ANTIBODY SCREEN: CPT

## 2022-01-05 PROCEDURE — 80053 COMPREHEN METABOLIC PANEL: CPT

## 2022-01-05 PROCEDURE — 86900 BLOOD TYPING SEROLOGIC ABO: CPT

## 2022-01-05 RX ORDER — SODIUM CHLORIDE 9 MG/ML
3 INJECTION INTRAMUSCULAR; INTRAVENOUS; SUBCUTANEOUS ONCE
Refills: 0 | Status: COMPLETED | OUTPATIENT
Start: 2022-01-05 | End: 2022-01-05

## 2022-01-05 RX ADMIN — SODIUM CHLORIDE 3 MILLILITER(S): 9 INJECTION INTRAMUSCULAR; INTRAVENOUS; SUBCUTANEOUS at 17:52

## 2022-01-05 NOTE — ED PROVIDER NOTE - CARE PLAN
1 Principal Discharge DX:	Anemia due to acute blood loss   Principal Discharge DX:	Anemia due to acute blood loss  Secondary Diagnosis:	Bladder tumor

## 2022-01-05 NOTE — ED ADULT TRIAGE NOTE - CHIEF COMPLAINT QUOTE
Pt presents to ED sent in by Dr. Klein. Pt was to have procedure 1/10, was at pre surg and found to be weak and pale and sent to ED. Pt gets weekly transfusions and missed this week. Pt has hx of bladder ca and has been having hematuria.

## 2022-01-05 NOTE — ED PROVIDER NOTE - PATIENT PORTAL LINK FT
You can access the FollowMyHealth Patient Portal offered by [facility] by registering at the following website: [email or web address]. By joining [employer]’s FollowMyHealth portal, you will also be able to view your health information using other applications (apps) compatible with our system.

## 2022-01-05 NOTE — ED PROVIDER NOTE - OBJECTIVE STATEMENT
89 y/o female with a PMHx of HLD and bladder tumor causing persistent hematuria, scheduled to have  surgery on 1/10/22 with Dr. Klein. Pt was found to be weak and pale and sent to the ED. Pt has no somatic complaints at this time.

## 2022-01-05 NOTE — ED PROVIDER NOTE - CLINICAL SUMMARY MEDICAL DECISION MAKING FREE TEXT BOX
Pt with hx of bladder with hematuria due to have procedure on 1/10/22 with Dr. Klein present to ED with paleness and weakness. Pt found ot have hemoglobin of 7 today, will transfuse.

## 2022-01-05 NOTE — ED ADULT NURSE NOTE - OBJECTIVE STATEMENT
pt arrives to ED sent in my MD for pale, cool. Hx anemia- requires blood transfusions. pt complaining of weakness. Hx bladder cancer. alert and oriented x 3.

## 2022-01-05 NOTE — ED ADULT NURSE REASSESSMENT NOTE - NS ED NURSE REASSESS COMMENT FT1
Pt is resting in her bed at this time. Blood transfusion started without any complications. Pt denies any pain or discomfort at this time. Safety and comfort measures in place. Hourly rounding will be done my time. Will continue to monitor.

## 2022-01-05 NOTE — ED PROVIDER NOTE - CARE PROVIDER_API CALL
Wesley Klein)  Urology  [address], [address] [place] [address]  Phone: [phone]  Fax: [phone]  Follow Up Time:

## 2022-01-05 NOTE — ED PROVIDER NOTE - PROGRESS NOTE DETAILS
Justin CARRILLO for Dr. Rodríguez: Spoke to Dr. Fonseca covering for Dr. Klein, plan is for patient to receive transfusions and not to be rushed to surgery unless she is bleeding profusely.

## 2022-01-06 PROBLEM — Z01.818 PRE-OP EXAM: Status: ACTIVE | Noted: 2022-01-06

## 2022-01-06 PROBLEM — D50.0 BLOOD LOSS ANEMIA: Status: ACTIVE | Noted: 2021-12-28

## 2022-01-07 DIAGNOSIS — D49.4 NEOPLASM OF UNSPECIFIED BEHAVIOR OF BLADDER: ICD-10-CM

## 2022-01-07 NOTE — ASSESSMENT
[High Risk Surgery - Intraperitoneal, Intrathoracic or Supringuinal Vascular Procedures] : High Risk Surgery - Intraperitoneal, Intrathoracic or Supringuinal Vascular Procedures - No (0) [Ischemic Heart Disease] : Ischemic Heart Disease - No (0) [Congestive Heart Failure] : Congestive Heart Failure - No (0) [Prior Cerebrovascular Accident or TIA] : Prior Cerebrovascular Accident or TIA - No (0) [Creatinine >= 2mg/dL (1 Point)] : Creatinine >= 2mg/dL - No (0) [Insulin-dependent Diabetic (1 Point)] : Insulin-dependent Diabetic - No (0) [0] : 0 , RCRI Class: I, Risk of Post-Op Cardiac Complications: 3.9%, 95% CI for Risk Estimate: 2.8% - 5.4% [Patient Optimized for Surgery] : Patient optimized for surgery [No Further Testing Recommended] : no further testing recommended [FreeTextEntry4] : pt. is going to have pre-op testing today.\par she will need blood transfusion as she is having gross hematuria , and she looks pale and she is hypotensive.\par will repeat CBC on Friday.\par advised son to hold metoprolol as she is hypotensive.

## 2022-01-07 NOTE — PHYSICAL EXAM
[Normal Voice/Communication] : normal voice/communication [Ill-Appearing] : ill-appearing [Cachectic] : cachexia was observed [No Edema] : there was no peripheral edema [Normal] : soft, non-tender, non-distended, no masses palpated, no HSM and normal bowel sounds [Speech Grossly Normal] : speech grossly normal [de-identified] : on wheel chair.

## 2022-01-07 NOTE — HISTORY OF PRESENT ILLNESS
[No Pertinent Pulmonary History] : no history of asthma, COPD, sleep apnea, or smoking [No Adverse Anesthesia Reaction] : no adverse anesthesia reaction in self or family member [(Patient denies any chest pain, claudication, dyspnea on exertion, orthopnea, palpitations or syncope)] : Patient denies any chest pain, claudication, dyspnea on exertion, orthopnea, palpitations or syncope [Unable to assess] : unable to assess [Family Member] : family member [Aortic Stenosis] : no aortic stenosis [Atrial Fibrillation] : no atrial fibrillation [Coronary Artery Disease] : no coronary artery disease [Recent Myocardial Infarction] : no recent myocardial infarction [Implantable Device/Pacemaker] : no implantable device/pacemaker [Chronic Anticoagulation] : no chronic anticoagulation [Chronic Kidney Disease] : no chronic kidney disease [Diabetes] : no diabetes [FreeTextEntry2] : 1/10/22 [FreeTextEntry3] : Dr Olson [FreeTextEntry4] : Ms. ARAVIND SHAW  is    89 year female . pt.have no known medical history.\par Pt. is over all feeling well.\par No change in weight.\par No change is bowel and bladder habit.\par No trouble sleeping at night.\par Not on pain.\par

## 2022-01-07 NOTE — ADDENDUM
[FreeTextEntry1] : pre-op labs done on 01/05/22 reviewed.\par labs revealed anemia , pt. received blood transfusion at the ER.\par all other labs are with normal range.\par Ekg : Sinus rhythm,no acute st-t wave changes..\par currently pt. is medically stable for the planned procedure.\par

## 2022-01-07 NOTE — REVIEW OF SYSTEMS
[Hematuria] : hematuria [Negative] : Gastrointestinal [Discharge] : no discharge [Redness] : no redness [FreeTextEntry2] : looks pale , weak, cachectic. [FreeTextEntry8] : gross hematuria. [FreeTextEntry9] : on wheel chair. [de-identified] : generalized weakness.

## 2022-01-08 ENCOUNTER — INPATIENT (INPATIENT)
Facility: HOSPITAL | Age: 87
LOS: 4 days | Discharge: HOME HEALTH | DRG: 668 | End: 2022-01-13
Attending: FAMILY MEDICINE | Admitting: INTERNAL MEDICINE
Payer: MEDICARE

## 2022-01-08 VITALS
TEMPERATURE: 99 F | DIASTOLIC BLOOD PRESSURE: 53 MMHG | RESPIRATION RATE: 18 BRPM | OXYGEN SATURATION: 100 % | WEIGHT: 154.98 LBS | SYSTOLIC BLOOD PRESSURE: 114 MMHG | HEIGHT: 60 IN | HEART RATE: 72 BPM

## 2022-01-08 DIAGNOSIS — D13.5 BENIGN NEOPLASM OF EXTRAHEPATIC BILE DUCTS: ICD-10-CM

## 2022-01-08 DIAGNOSIS — H35.30 UNSPECIFIED MACULAR DEGENERATION: ICD-10-CM

## 2022-01-08 DIAGNOSIS — E78.5 HYPERLIPIDEMIA, UNSPECIFIED: ICD-10-CM

## 2022-01-08 DIAGNOSIS — D62 ACUTE POSTHEMORRHAGIC ANEMIA: ICD-10-CM

## 2022-01-08 DIAGNOSIS — R31.0 GROSS HEMATURIA: ICD-10-CM

## 2022-01-08 DIAGNOSIS — C67.8 MALIGNANT NEOPLASM OF OVERLAPPING SITES OF BLADDER: ICD-10-CM

## 2022-01-08 DIAGNOSIS — D63.0 ANEMIA IN NEOPLASTIC DISEASE: ICD-10-CM

## 2022-01-08 DIAGNOSIS — I48.0 PAROXYSMAL ATRIAL FIBRILLATION: ICD-10-CM

## 2022-01-08 DIAGNOSIS — D64.9 ANEMIA, UNSPECIFIED: ICD-10-CM

## 2022-01-08 DIAGNOSIS — E88.09 OTHER DISORDERS OF PLASMA-PROTEIN METABOLISM, NOT ELSEWHERE CLASSIFIED: ICD-10-CM

## 2022-01-08 DIAGNOSIS — E43 UNSPECIFIED SEVERE PROTEIN-CALORIE MALNUTRITION: ICD-10-CM

## 2022-01-08 DIAGNOSIS — E03.9 HYPOTHYROIDISM, UNSPECIFIED: ICD-10-CM

## 2022-01-08 DIAGNOSIS — Z79.01 LONG TERM (CURRENT) USE OF ANTICOAGULANTS: ICD-10-CM

## 2022-01-08 LAB
ALBUMIN SERPL ELPH-MCNC: 2.6 G/DL — LOW (ref 3.3–5)
ALP SERPL-CCNC: 60 U/L — SIGNIFICANT CHANGE UP (ref 40–120)
ALT FLD-CCNC: 13 U/L — SIGNIFICANT CHANGE UP (ref 12–78)
ANION GAP SERPL CALC-SCNC: 3 MMOL/L — LOW (ref 5–17)
APPEARANCE UR: ABNORMAL
APTT BLD: 33 SEC — SIGNIFICANT CHANGE UP (ref 27.5–35.5)
AST SERPL-CCNC: 19 U/L — SIGNIFICANT CHANGE UP (ref 15–37)
BASOPHILS # BLD AUTO: 0.02 K/UL — SIGNIFICANT CHANGE UP (ref 0–0.2)
BASOPHILS NFR BLD AUTO: 0.4 % — SIGNIFICANT CHANGE UP (ref 0–2)
BILIRUB SERPL-MCNC: 0.4 MG/DL — SIGNIFICANT CHANGE UP (ref 0.2–1.2)
BILIRUB UR-MCNC: NEGATIVE — SIGNIFICANT CHANGE UP
BUN SERPL-MCNC: 14 MG/DL — SIGNIFICANT CHANGE UP (ref 7–23)
CALCIUM SERPL-MCNC: 9 MG/DL — SIGNIFICANT CHANGE UP (ref 8.5–10.1)
CHLORIDE SERPL-SCNC: 109 MMOL/L — HIGH (ref 96–108)
CO2 SERPL-SCNC: 30 MMOL/L — SIGNIFICANT CHANGE UP (ref 22–31)
COLOR SPEC: ABNORMAL
CREAT SERPL-MCNC: 0.73 MG/DL — SIGNIFICANT CHANGE UP (ref 0.5–1.3)
DIFF PNL FLD: ABNORMAL
EOSINOPHIL # BLD AUTO: 0.11 K/UL — SIGNIFICANT CHANGE UP (ref 0–0.5)
EOSINOPHIL NFR BLD AUTO: 2.1 % — SIGNIFICANT CHANGE UP (ref 0–6)
GLUCOSE SERPL-MCNC: 122 MG/DL — HIGH (ref 70–99)
GLUCOSE UR QL: NEGATIVE MG/DL — SIGNIFICANT CHANGE UP
HCT VFR BLD CALC: 26.8 % — LOW (ref 34.5–45)
HGB BLD-MCNC: 8.4 G/DL — LOW (ref 11.5–15.5)
IMM GRANULOCYTES NFR BLD AUTO: 0.4 % — SIGNIFICANT CHANGE UP (ref 0–1.5)
INR BLD: 1.1 RATIO — SIGNIFICANT CHANGE UP (ref 0.88–1.16)
KETONES UR-MCNC: ABNORMAL
LEUKOCYTE ESTERASE UR-ACNC: NEGATIVE — SIGNIFICANT CHANGE UP
LYMPHOCYTES # BLD AUTO: 1 K/UL — SIGNIFICANT CHANGE UP (ref 1–3.3)
LYMPHOCYTES # BLD AUTO: 19.2 % — SIGNIFICANT CHANGE UP (ref 13–44)
MCHC RBC-ENTMCNC: 31 PG — SIGNIFICANT CHANGE UP (ref 27–34)
MCHC RBC-ENTMCNC: 31.3 GM/DL — LOW (ref 32–36)
MCV RBC AUTO: 98.9 FL — SIGNIFICANT CHANGE UP (ref 80–100)
MONOCYTES # BLD AUTO: 0.53 K/UL — SIGNIFICANT CHANGE UP (ref 0–0.9)
MONOCYTES NFR BLD AUTO: 10.2 % — SIGNIFICANT CHANGE UP (ref 2–14)
NEUTROPHILS # BLD AUTO: 3.53 K/UL — SIGNIFICANT CHANGE UP (ref 1.8–7.4)
NEUTROPHILS NFR BLD AUTO: 67.7 % — SIGNIFICANT CHANGE UP (ref 43–77)
NITRITE UR-MCNC: NEGATIVE — SIGNIFICANT CHANGE UP
PH UR: 5 — SIGNIFICANT CHANGE UP (ref 5–8)
PLATELET # BLD AUTO: 304 K/UL — SIGNIFICANT CHANGE UP (ref 150–400)
POTASSIUM SERPL-MCNC: 3.8 MMOL/L — SIGNIFICANT CHANGE UP (ref 3.5–5.3)
POTASSIUM SERPL-SCNC: 3.8 MMOL/L — SIGNIFICANT CHANGE UP (ref 3.5–5.3)
PROT SERPL-MCNC: 5.9 GM/DL — LOW (ref 6–8.3)
PROT UR-MCNC: 100 MG/DL
PROTHROM AB SERPL-ACNC: 12.7 SEC — SIGNIFICANT CHANGE UP (ref 10.6–13.6)
RBC # BLD: 2.71 M/UL — LOW (ref 3.8–5.2)
RBC # FLD: 15.2 % — HIGH (ref 10.3–14.5)
SARS-COV-2 RNA SPEC QL NAA+PROBE: SIGNIFICANT CHANGE UP
SODIUM SERPL-SCNC: 142 MMOL/L — SIGNIFICANT CHANGE UP (ref 135–145)
SP GR SPEC: 1.02 — SIGNIFICANT CHANGE UP (ref 1.01–1.02)
UROBILINOGEN FLD QL: NEGATIVE MG/DL — SIGNIFICANT CHANGE UP
WBC # BLD: 5.21 K/UL — SIGNIFICANT CHANGE UP (ref 3.8–10.5)
WBC # FLD AUTO: 5.21 K/UL — SIGNIFICANT CHANGE UP (ref 3.8–10.5)

## 2022-01-08 PROCEDURE — 87116 MYCOBACTERIA CULTURE: CPT

## 2022-01-08 PROCEDURE — 97161 PT EVAL LOW COMPLEX 20 MIN: CPT | Mod: GP

## 2022-01-08 PROCEDURE — 82728 ASSAY OF FERRITIN: CPT

## 2022-01-08 PROCEDURE — P9040: CPT

## 2022-01-08 PROCEDURE — 88307 TISSUE EXAM BY PATHOLOGIST: CPT

## 2022-01-08 PROCEDURE — 85018 HEMOGLOBIN: CPT

## 2022-01-08 PROCEDURE — 84100 ASSAY OF PHOSPHORUS: CPT

## 2022-01-08 PROCEDURE — 84439 ASSAY OF FREE THYROXINE: CPT

## 2022-01-08 PROCEDURE — 93005 ELECTROCARDIOGRAM TRACING: CPT

## 2022-01-08 PROCEDURE — 99285 EMERGENCY DEPT VISIT HI MDM: CPT

## 2022-01-08 PROCEDURE — 99222 1ST HOSP IP/OBS MODERATE 55: CPT

## 2022-01-08 PROCEDURE — 97116 GAIT TRAINING THERAPY: CPT | Mod: GP

## 2022-01-08 PROCEDURE — 86923 COMPATIBILITY TEST ELECTRIC: CPT

## 2022-01-08 PROCEDURE — 71045 X-RAY EXAM CHEST 1 VIEW: CPT | Mod: 26

## 2022-01-08 PROCEDURE — 36430 TRANSFUSION BLD/BLD COMPNT: CPT

## 2022-01-08 PROCEDURE — C9399: CPT

## 2022-01-08 PROCEDURE — 86769 SARS-COV-2 COVID-19 ANTIBODY: CPT

## 2022-01-08 PROCEDURE — 80048 BASIC METABOLIC PNL TOTAL CA: CPT

## 2022-01-08 PROCEDURE — 85014 HEMATOCRIT: CPT

## 2022-01-08 PROCEDURE — 87086 URINE CULTURE/COLONY COUNT: CPT

## 2022-01-08 PROCEDURE — 87015 SPECIMEN INFECT AGNT CONCNTJ: CPT

## 2022-01-08 PROCEDURE — 80061 LIPID PANEL: CPT

## 2022-01-08 PROCEDURE — 99497 ADVNCD CARE PLAN 30 MIN: CPT | Mod: 25

## 2022-01-08 PROCEDURE — 83735 ASSAY OF MAGNESIUM: CPT

## 2022-01-08 PROCEDURE — 87102 FUNGUS ISOLATION CULTURE: CPT

## 2022-01-08 PROCEDURE — 36415 COLL VENOUS BLD VENIPUNCTURE: CPT

## 2022-01-08 PROCEDURE — 82607 VITAMIN B-12: CPT

## 2022-01-08 PROCEDURE — 83036 HEMOGLOBIN GLYCOSYLATED A1C: CPT

## 2022-01-08 PROCEDURE — 71045 X-RAY EXAM CHEST 1 VIEW: CPT

## 2022-01-08 PROCEDURE — 87206 SMEAR FLUORESCENT/ACID STAI: CPT

## 2022-01-08 PROCEDURE — 81001 URINALYSIS AUTO W/SCOPE: CPT

## 2022-01-08 PROCEDURE — 82306 VITAMIN D 25 HYDROXY: CPT

## 2022-01-08 PROCEDURE — 80053 COMPREHEN METABOLIC PANEL: CPT

## 2022-01-08 PROCEDURE — 85027 COMPLETE CBC AUTOMATED: CPT

## 2022-01-08 RX ORDER — LEVOTHYROXINE SODIUM 125 MCG
25 TABLET ORAL DAILY
Refills: 0 | Status: DISCONTINUED | OUTPATIENT
Start: 2022-01-08 | End: 2022-01-13

## 2022-01-08 RX ORDER — METOPROLOL TARTRATE 50 MG
0.5 TABLET ORAL
Qty: 0 | Refills: 0 | DISCHARGE

## 2022-01-08 RX ORDER — FERROUS SULFATE 325(65) MG
325 TABLET ORAL
Refills: 0 | Status: DISCONTINUED | OUTPATIENT
Start: 2022-01-08 | End: 2022-01-09

## 2022-01-08 RX ORDER — METOPROLOL TARTRATE 50 MG
1 TABLET ORAL
Qty: 0 | Refills: 0 | DISCHARGE

## 2022-01-08 RX ORDER — ONDANSETRON 8 MG/1
4 TABLET, FILM COATED ORAL EVERY 8 HOURS
Refills: 0 | Status: DISCONTINUED | OUTPATIENT
Start: 2022-01-08 | End: 2022-01-13

## 2022-01-08 RX ORDER — LANOLIN ALCOHOL/MO/W.PET/CERES
3 CREAM (GRAM) TOPICAL AT BEDTIME
Refills: 0 | Status: DISCONTINUED | OUTPATIENT
Start: 2022-01-08 | End: 2022-01-13

## 2022-01-08 RX ORDER — METOPROLOL TARTRATE 50 MG
12.5 TABLET ORAL
Refills: 0 | Status: DISCONTINUED | OUTPATIENT
Start: 2022-01-08 | End: 2022-01-13

## 2022-01-08 RX ORDER — ASCORBIC ACID 60 MG
500 TABLET,CHEWABLE ORAL DAILY
Refills: 0 | Status: DISCONTINUED | OUTPATIENT
Start: 2022-01-08 | End: 2022-01-13

## 2022-01-08 RX ORDER — ACETAMINOPHEN 500 MG
650 TABLET ORAL EVERY 6 HOURS
Refills: 0 | Status: DISCONTINUED | OUTPATIENT
Start: 2022-01-08 | End: 2022-01-13

## 2022-01-08 RX ORDER — ENOXAPARIN SODIUM 100 MG/ML
40 INJECTION SUBCUTANEOUS DAILY
Refills: 0 | Status: DISCONTINUED | OUTPATIENT
Start: 2022-01-08 | End: 2022-01-09

## 2022-01-08 RX ORDER — LOVASTATIN 20 MG
1 TABLET ORAL
Qty: 0 | Refills: 0 | DISCHARGE

## 2022-01-08 RX ADMIN — ENOXAPARIN SODIUM 40 MILLIGRAM(S): 100 INJECTION SUBCUTANEOUS at 17:28

## 2022-01-08 RX ADMIN — Medication 1 TABLET(S): at 17:30

## 2022-01-08 RX ADMIN — Medication 325 MILLIGRAM(S): at 21:17

## 2022-01-08 RX ADMIN — Medication 12.5 MILLIGRAM(S): at 21:20

## 2022-01-08 RX ADMIN — Medication 25 MICROGRAM(S): at 17:30

## 2022-01-08 RX ADMIN — Medication 500 MILLIGRAM(S): at 17:30

## 2022-01-08 NOTE — ED ADULT NURSE NOTE - OBJECTIVE STATEMENT
Uzbek speaking female sent in from home for low hemoglobin.  Plan is for surgery on Monday for bladder CA.  As per son, patient denies CP or SOB.  She is resting comfortably at this time.

## 2022-01-08 NOTE — ED PROVIDER NOTE - CLINICAL SUMMARY MEDICAL DECISION MAKING FREE TEXT BOX
89 y/o female with 89 y/o female with known anemia, bladder cancer, sent for evaluation of anemia. Plan for EKG, CXR, labs, urine, reevaluation.

## 2022-01-08 NOTE — ED PROVIDER NOTE - NSICDXPASTMEDICALHX_GEN_ALL_CORE_FT
PAST MEDICAL HISTORY:  HLD (hyperlipidemia)        PAST MEDICAL HISTORY:  HLD (hyperlipidemia)       Hypothyroid     Macular degeneration     Malignant neoplasm of bladder

## 2022-01-08 NOTE — ED PROVIDER NOTE - OBJECTIVE STATEMENT
89 y/o female presents to the ED 89 y/o female with PMHx of hypothyroidism, HLD, macular degeneration, malignant neoplasm of bladder presents to the ED 89 y/o female with recent diagnosis of bladder cancer (due to have surgery on Monday, 1/10), in the interim continued to be anemic as she has been having hematuria since her diagnosis. Pt went for pre-op testing on Wednesday and was found to be anemic, and was brought to the hospital, where she received 1 unit of PRBCs. Patient's daughter states she received phone call yesterday saying that patient's hemoglobin was 7 and to bring to the hospital for reevaluation. Pt with no dizziness, weakness, chest pain, or SOB at this time.

## 2022-01-08 NOTE — H&P ADULT - NSHPLABSRESULTS_GEN_ALL_CORE
LABS: All Labs Reviewed:                        8.4    5.21  )-----------( 304      ( 08 Jan 2022 10:05 )             26.8     01-08    142  |  109<H>  |  14  ----------------------------<  122<H>  3.8   |  30  |  0.73    Ca    9.0      08 Jan 2022 10:05    TPro  5.9<L>  /  Alb  2.6<L>  /  TBili  0.4  /  DBili  x   /  AST  19  /  ALT  13  /  AlkPhos  60  01-08    PT/INR - ( 08 Jan 2022 10:05 )   PT: 12.7 sec;   INR: 1.10 ratio      PTT - ( 08 Jan 2022 10:05 )  PTT:33.0 sec    Radiology:  12/11/22: Recent CT A/P w/ IVC: 2 polypoid bladder masses suspicious for multifocal bladder neoplasm.    Micro:  COVID-19 PCR: NotDetec (08 Jan 2022 10:05)    Cardiac Testing:    Medications:    Home Medications:  ferrous sulfate 325 mg (65 mg elemental iron) oral tablet: 1 tab(s) orally 2 times a day (08 Jan 2022 12:52)  levoFLOXacin 500 mg oral tablet: 1 tab(s) orally every 24 hours  ***not complete*** (08 Jan 2022 12:52)  levothyroxine 25 mcg (0.025 mg) oral tablet: 1 tab(s) orally once a day (08 Jan 2022 12:52)  metoprolol tartrate 25 mg oral tablet: 0.5 tab(s) orally once a day (08 Jan 2022 12:52) LABS: All Labs Reviewed:                        8.4    5.21  )-----------( 304      ( 08 Jan 2022 10:05 )             26.8     01-08    142  |  109<H>  |  14  ----------------------------<  122<H>  3.8   |  30  |  0.73    Ca    9.0      08 Jan 2022 10:05    TPro  5.9<L>  /  Alb  2.6<L>  /  TBili  0.4  /  DBili  x   /  AST  19  /  ALT  13  /  AlkPhos  60  01-08    PT/INR - ( 08 Jan 2022 10:05 )   PT: 12.7 sec;   INR: 1.10 ratio      PTT - ( 08 Jan 2022 10:05 )  PTT:33.0 sec    Radiology:  12/11/22: Recent CT A/P w/ IVC: 2 polypoid bladder masses suspicious for multifocal bladder neoplasm.    Micro:  COVID-19 PCR: NotDetec (08 Jan 2022 10:05)    Cardiac Testing: none    Medications:  ascorbic acid 500 milliGRAM(s) Oral daily  ferrous    sulfate 325 milliGRAM(s) Oral two times a day  levothyroxine 25 MICROGram(s) Oral daily  metoprolol tartrate 12.5 milliGRAM(s) Oral two times a day    Home Medications:  ferrous sulfate 325 mg (65 mg elemental iron) oral tablet: 1 tab(s) orally 2 times a day (08 Jan 2022 12:52)  levoFLOXacin 500 mg oral tablet: 1 tab(s) orally every 24 hours  ***not complete*** (08 Jan 2022 12:52)  levothyroxine 25 mcg (0.025 mg) oral tablet: 1 tab(s) orally once a day (08 Jan 2022 12:52)  metoprolol tartrate 25 mg oral tablet: 0.5 tab(s) orally once a day (08 Jan 2022 12:52)

## 2022-01-08 NOTE — ED PROVIDER NOTE - PROGRESS NOTE DETAILS
Justin Fonseca who spoke to Ernie. Pt is to be admitted to the hospital for medical optimization. Will hold transfusion at this time. Justin KEENE for Dr. Mariana Fonseca covering for Dr. Klein- Pt is to be admitted to the hospital for medical optimization. Will hold transfusion at this time. Mariana SAN: Endorsed to Dr. Roberto for admission. Family made aware of plan of care at this time. Per daughter- patient is unvaccinated for covid.

## 2022-01-08 NOTE — H&P ADULT - NSICDXPASTMEDICALHX_GEN_ALL_CORE_FT
PAST MEDICAL HISTORY:  HLD (hyperlipidemia)     Hypothyroid     Macular degeneration     Malignant neoplasm of bladder

## 2022-01-08 NOTE — ED ADULT TRIAGE NOTE - CHIEF COMPLAINT QUOTE
Pt presents to er with complaints of low HGB and history of bladder CA, as per pt's son [phone]  Errol  [phone], pt needs blood transfusion to to have surgical procedure on Monday, pt is Amharic speaking and allows son to translate for pt at this time, pt denies chest pain, sob, observed sitting up in chair without distress.

## 2022-01-08 NOTE — ED ADULT NURSE NOTE - CHIEF COMPLAINT QUOTE
Pt presents to er with complaints of low HGB and history of bladder CA, as per pt's son [phone]  Errol  [phone], pt needs blood transfusion to to have surgical procedure on Monday, pt is Vietnamese speaking and allows son to translate for pt at this time, pt denies chest pain, sob, observed sitting up in chair without distress.

## 2022-01-08 NOTE — PATIENT PROFILE ADULT - FALL HARM RISK - HARM RISK INTERVENTIONS

## 2022-01-08 NOTE — H&P ADULT - NSHPSOCIALHISTORY_GEN_ALL_CORE
23 y.o.   OB History        1    Para   1    Term   1            AB        Living   1       SAB        TAB        Ectopic        Multiple        Live Births                   Comlaining of: pt co uti   Missed her last depo appt, bleeding now, expected  Some cramps  Burning with urination, no lesions        ROS:  GENERAL: No fever, chills, fatigability or weight loss.  SKIN: No rashes, itching or changes in color or texture of skin.  HEAD: No headaches or recent head trauma.  EYES: Visual acuity fine. No photophobia, ocular pain or diplopia.  EARS: Denies ear pain, discharge or vertigo.  NOSE: No loss of smell, no epistaxis or postnasal drip.  MOUTH & THROAT: No hoarseness or change in voice. No excessive gum bleeding.  NODES: Denies swollen glands.  CHEST: Denies SNYDER, cyanosis, wheezing, cough and sputum production.  CARDIOVASCULAR: Denies chest pain, PND, orthopnea or reduced exercise tolerance.  ABDOMEN: Appetite fine. No weight loss. Denies diarrhea, abdominal pain, hematemesis or blood in stool.  URINARY: No flank pain, dysuria or hematuria.  PERIPHERAL VASCULAR: No claudication or cyanosis.  MUSCULOSKELETAL: No joint stiffness or swelling. Denies back pain.  NEUROLOGIC: No history of seizures, paralysis, alteration of gait or coordination      PE: /68   Ht 5' (1.524 m)   Wt 61 kg (134 lb 6.4 oz)   BMI 26.25 kg/m²    abd soft, nontneder  Pt prefers not to have exam on cycle  Extr normal    A uti symtoms  Send culture  Ok for depo today if upt neg  Will do pap next depo shot  Send matt macrobid         Living Situation:  Tobacco Use:  Alcohol Use:  Drug Use:    COVID Vaccinated Living Situation: Home with family   Tobacco Use: Denies use  Alcohol Use: Denies use  Drug Use: Denies use    COVID Vaccinated x2 Living Situation: Home with family   Tobacco Use: Denies use  Alcohol Use: Denies use  Drug Use: Denies use

## 2022-01-08 NOTE — H&P ADULT - ATTENDING COMMENTS
Patient seen and examined with the NP. Agree with above plan of care which was discussed with the patient, team and NP.   89 y/o female with PMHx of HLD, hypothyroidism, chronic anemia Hb better today 8.4, macular degeneration, recent diagnosis of bladder mass; suspicion for bladder CA (due to have surgery on Monday, 1/10),   admitted for   medical optimization for bladder Sx,  chronic anemia    admit to med floor  NPO past midnight tomorrow night  cbc in am  PRBC if Hb drops  cardiac clearance with Dr. Kassandra PURI and advance care planning meeting done with the patient's son. He wishes her  to be fully resuscitated and mechanically ventilated if need arises. There are no limitations of any sort in her medical care and management. She is FULL CODE. Additional time spent 17 min.

## 2022-01-08 NOTE — H&P ADULT - HISTORY OF PRESENT ILLNESS
89 y/o female with PMHx of HLD, hypothyroidism, macular degeneration, recent diagnosis of bladder mass - suspicion for bladder CA (due to have surgery on Monday, 1/10), in the interim continued to be anemic as she has been having hematuria since her diagnosis. Pt went for pre-op testing on Wednesday and was found to be anemic, and was brought to the hospital, where she received 1 unit of PRBCs. Patient's daughter states she received phone call yesterday saying that patient's hemoglobin was 7 and to bring to the hospital for reevaluation.  ~ Of note, patient with recent admission from 12/11 to 12/14, during that admission patient found to have on CT scan 2 polypoid soft tissue nodules in bladder - a nodule along the left posterior wall measures 3.7 x 2.7 x 2.0 cm and a nodule at the base of the bladder measures 1.2 x 1.4 x 1.8 cm.    Seen and examined in ED. 89 y/o female with PMHx of HLD, hypothyroidism, macular degeneration, recent diagnosis of bladder mass - suspicion for bladder CA (due to have surgery on Monday, 1/10), in the interim continued to be anemic as she has been having hematuria since her diagnosis. Pt went for pre-op testing on Wednesday and was found to be anemic, and was brought to the hospital, where she received 1 unit of PRBCs. Patient's daughter states she received phone call yesterday saying that patient's hemoglobin was 7 and to bring to the hospital for reevaluation.  ~ Of note, patient with recent admission from 12/11 to 12/14, during that admission patient found to have on CT scan 2 polypoid soft tissue nodules in bladder - a nodule along the left posterior wall measures 3.7 x 2.7 x 2.0 cm and a nodule at the base of the bladder measures 1.2 x 1.4 x 1.8 cm.    Seen and examined in ED. Weakness,  89 y/o female with PMHx of HLD, hypothyroidism, macular degeneration, recent diagnosis of bladder mass - suspicion for bladder CA (due to have surgery on Monday, 1/10), in the interim continued to be anemic as she has been having hematuria since her diagnosis. Pt went for pre-op testing on Wednesday and was found to be anemic, and was brought to the hospital, where she received 1 unit of PRBCs. Patient's daughter states she received phone call yesterday saying that patient's hemoglobin was 7 and to bring to the hospital for reevaluation.  ~ Of note, patient with recent admission from 12/11 to 12/14, during that admission patient found to have on CT scan 2 polypoid soft tissue nodules in bladder - a nodule along the left posterior wall measures 3.7 x 2.7 x 2.0 cm and a nodule at the base of the bladder measures 1.2 x 1.4 x 1.8 cm.  ~ Seen and examined in ED. Used Bengali  services, patient denies symptoms at this time. Weakness and depression reported per son. Son Fei [phone] or Errol  [phone] requesting to use them for interpretation, refusing  services.

## 2022-01-08 NOTE — H&P ADULT - NSHPPHYSICALEXAM_GEN_ALL_CORE
PHYSICAL EXAM:  Constitutional: NAD, awake and alert  HEENT: PERR, EOMI, Normal Hearing, MMM  Neck: Soft and supple, No LAD, No JVD  Respiratory: Breath sounds are clear bilaterally, No wheezing, rales or rhonchi  Cardiovascular: S1 and S2, regular rate and rhythm, no Murmurs, gallops or rubs  Gastrointestinal: Bowel Sounds present, soft, nontender, nondistended, no guarding, no rebound  Extremities: No peripheral edema  Vascular: 2+ peripheral pulses  Neurological: A/O x 3, no focal deficits  Musculoskeletal: 5/5 strength b/l upper and lower extremities  Skin: No rashes    Vital Signs Last 24 Hrs  T(C): 37 (08 Jan 2022 11:33), Max: 37 (08 Jan 2022 09:37)  T(F): 98.6 (08 Jan 2022 11:33), Max: 98.6 (08 Jan 2022 09:37)  HR: 64 (08 Jan 2022 11:33) (64 - 72)  BP: 117/58 (08 Jan 2022 11:33) (114/53 - 117/58)  BP(mean): 76 (08 Jan 2022 11:33) (71 - 76)  RR: 15 (08 Jan 2022 11:33) (15 - 18)  SpO2: 100% (08 Jan 2022 11:33) (100% - 100%) PHYSICAL EXAM:  Constitutional: Awake and alert, thia appearing, cachectic   HEENT: Normal Hearing, MMM  Neck: Soft and supple, No LAD, No JVD  Respiratory: Breath sounds are clear bilaterally, No wheezing, rales or rhonchi  Cardiovascular: S1 and S2, regular rate and rhythm, no Murmurs, gallops or rubs  Gastrointestinal: Bowel Sounds present, soft, nontender, nondistended, no guarding, no rebound  Extremities: No peripheral edema  Vascular: 2+ peripheral pulses  Neurological: A/O x 3, no focal deficits  Musculoskeletal: 5/5 strength b/l upper and lower extremities  Skin: No rashes    Vital Signs Last 24 Hrs  T(C): 37 (08 Jan 2022 11:33), Max: 37 (08 Jan 2022 09:37)  T(F): 98.6 (08 Jan 2022 11:33), Max: 98.6 (08 Jan 2022 09:37)  HR: 64 (08 Jan 2022 11:33) (64 - 72)  BP: 117/58 (08 Jan 2022 11:33) (114/53 - 117/58)  BP(mean): 76 (08 Jan 2022 11:33) (71 - 76)  RR: 15 (08 Jan 2022 11:33) (15 - 18)  SpO2: 100% (08 Jan 2022 11:33) (100% - 100%) room air

## 2022-01-08 NOTE — PATIENT PROFILE ADULT - PUBLIC BENEFITS
Quality 111:Pneumonia Vaccination Status For Older Adults: Pneumococcal Vaccination not Administered or Previously Received, Reason not Otherwise Specified Quality 130: Documentation Of Current Medications In The Medical Record: Current Medications Documented Quality 110: Preventive Care And Screening: Influenza Immunization: Influenza Immunization Administered during Influenza season Quality 131: Pain Assessment And Follow-Up: Pain assessment using a standardized tool is documented as negative, no follow-up plan required Quality 431: Preventive Care And Screening: Unhealthy Alcohol Use - Screening: Patient screened for unhealthy alcohol use using a single question and scores less than 2 times per year Detail Level: Detailed Quality 402: Tobacco Use And Help With Quitting Among Adolescents: Patient screened for tobacco and never smoked Quality 226: Preventive Care And Screening: Tobacco Use: Screening And Cessation Intervention: Patient screened for tobacco use and is an ex/non-smoker no

## 2022-01-08 NOTE — H&P ADULT - ASSESSMENT
89 y/o female with PMHx of HLD, hypothyroidism, macular degeneration, recent diagnosis of bladder mass - suspicion for bladder CA (due to have surgery on Monday, 1/10), in the interim continued to be anemic as she has been having hematuria since her diagnosis. Patient admitted to medicine service for further management of acute on chronic anemia likely due to probable bladder neoplasms.    Bladder Masses  Recent CT scan as above. 2 polypoid bladder masses suspicious for multifocal bladder neoplasm. Patient is due to have surgery on Monday 1/10, pre-op testing on Wednesday 1/5 revealing Hgb of 7, received blood transfusion and discharged home. Of note, patient has been experiencing persistent hematuria since her diagnosis.  - Urologist: Dr. Fonseca  - Surgery for Monday, will need to optimize patient, transfuse PRN  - Keep NPO on Sunday PM.     Acute on Chronic Anemia   Multifactorial ~ due to hematuria from probable bladder neoplasms and iron deficiency   - S/p 1U of PRBCs on 1/5, patient sent to hospital for anemia, received a blood transfusion and discharged home.  - Monitor H&H, stable   - C/w Iron supplements w/ Vitamin C    Hypothyroidism  Patient started on synthroid 12/11  - C/w synthroid 25mcg, repeat TFTs next week if patient still admitted for f/u TFTs    HLD  Patient previously on lovastatin, has not renewed prescription in several months  - F/u lipid panel in AM    Hypoalbuminemia. Malnutrition  - Nutrition eval     DVT ppx  - SCDs in setting of anemia         89 y/o female with PMHx of HLD, hypothyroidism, macular degeneration, recent diagnosis of bladder mass - suspicion for bladder CA (due to have surgery on Monday, 1/10), in the interim continued to be anemic as she has been having hematuria since her diagnosis. Patient admitted to medicine service for further management of acute on chronic anemia likely due to probable bladder neoplasms.    Bladder Masses  Recent CT scan as above. 2 polypoid bladder masses suspicious for multifocal bladder neoplasm. Patient is due to have surgery on Monday 1/10, pre-op testing on Wednesday 1/5 revealing Hgb of 7, received blood transfusion and discharged home. Of note, patient has been experiencing persistent hematuria since her diagnosis.  - Urologist: Dr. Fonseca  - Surgery for Monday, will need to optimize patient, transfuse PRN  - Keep NPO on Sunday PM.     Acute on Chronic Anemia   Multifactorial ~ due to hematuria from probable bladder neoplasms and iron deficiency   - S/p 1U of PRBCs on 1/5, patient sent to hospital for anemia, received a blood transfusion and discharged home.  - Monitor H&H, stable   - C/w Iron supplements w/ Vitamin C    Hypothyroidism  Patient started on synthroid 12/11  - C/w synthroid 25mcg, repeat TFTs next week if patient still admitted for f/u TFTs    HLD  Patient previously on lovastatin, has not renewed prescription in several months  - F/u lipid panel in AM    Hypoalbuminemia. Malnutrition  - Nutrition eval     DVT ppx  - SCDs in setting of anemia      Cell [phone]    Errol  [phone]         89 y/o female with PMHx of HLD, hypothyroidism, macular degeneration, recent diagnosis of bladder mass - suspicion for bladder CA (due to have surgery on Monday, 1/10), in the interim continued to be anemic as she has been having hematuria since her diagnosis. Patient admitted to medicine service for further management of acute on chronic anemia likely due to probable bladder neoplasms.    Bladder Masses. Probable Bladder CA.  Recent CT scan as above. 2 polypoid bladder masses suspicious for multifocal bladder neoplasm. Patient is due to have surgery on Monday 1/10, pre-op testing on Wednesday 1/5 revealing Hgb of 7, received blood transfusion and discharged home. Of note, patient has been experiencing persistent hematuria since her diagnosis.  - Urologist: Dr. Fonseca  - Surgery for Monday, will need to optimize patient, transfuse PRN. Monitor H&H for now.  - Keep NPO on Sunday PM.     Acute on Chronic Anemia   Multifactorial ~ due to hematuria from probable bladder neoplasms and iron deficiency   - S/p 1U of PRBCs on 1/5, patient sent to hospital for anemia, received a blood transfusion and discharged home.  - Monitor H&H, stable   - C/w Iron supplements w/ Vitamin C    Hypothyroidism  Patient started on synthroid 12/11  - C/w synthroid 25mcg, repeat TFTs next week if patient still admitted for f/u TFTs    HLD  Patient previously on lovastatin, has not renewed prescription in several months  - F/u lipid panel in AM    Hypoalbuminemia. Malnutrition  - Nutrition eval     HTN  C/w BB w/parameters, recent hypotension per pre-op vitals    GOC / Advanced Care Planning:   - Spoke w/ Son Bill (HCP) over phone, no limitations on cardiac resuscitation or intubation and is full code. Patient has been very depressed as per son about current medical situation and diagnosis.    DVT ppx  - SCDs in setting of anemia    Dispo: Inpatient. Med-Surg. NPO on Sunday PM for OR on Monday.    89 y/o female with PMHx of HLD, hypothyroidism, macular degeneration, recent diagnosis of bladder mass - suspicion for bladder CA (due to have surgery on Monday, 1/10), in the interim continued to be anemic as she has been having hematuria since her diagnosis. Patient admitted to medicine service for further management of acute on chronic anemia likely due to probable bladder neoplasms.    Bladder Masses. Probable Bladder CA.  Recent CT scan as above. 2 polypoid bladder masses suspicious for multifocal bladder neoplasm. Patient is due to have surgery on Monday 1/10, pre-op testing on Wednesday 1/5 revealing Hgb of 7, received blood transfusion and discharged home. Of note, patient has been experiencing persistent hematuria since her diagnosis.  - Urologist: Dr. Fonseca  - Surgery for Monday, will need to optimize patient, transfuse PRN. Monitor H&H for now d/w urology  - ECG pre-op  - Keep NPO on Sunday PM.     Acute on Chronic Anemia   Multifactorial ~ due to hematuria from probable bladder neoplasms and iron deficiency   - S/p 1U of PRBCs on 1/5, patient sent to hospital for anemia, received a blood transfusion and discharged home.  - Monitor H&H, stable.   - C/w Iron supplements w/ Vitamin C    Hypothyroidism  Patient started on synthroid 12/11  - C/w synthroid 25mcg, repeat TFTs next week if patient still admitted for f/u TFTs    HLD  Patient previously on lovastatin, has not renewed prescription in several months  - F/u lipid panel in AM    Hypoalbuminemia. Malnutrition  - Nutrition eval   - Added ensure w/ meals, MVI daily     HTN  - C/w BB w/parameters, recent hypotension per pre-op vitals    GOC / Advanced Care Planning:   - Spoke w/ Son Fei (HCP) over phone, no limitations on cardiac resuscitation or intubation and is full code. Patient has been very depressed as per son about current medical situation and diagnosis.    DVT ppx  - SCDs in setting of anemia    1/8/22: Spoke with son Fei, updated in full, all questions and concerns addressed.    Dispo: Inpatient. Med- Surg. NPO on Sunday PM for OR on Monday.

## 2022-01-09 LAB
A1C WITH ESTIMATED AVERAGE GLUCOSE RESULT: 4.8 % — SIGNIFICANT CHANGE UP (ref 4–5.6)
ALBUMIN SERPL ELPH-MCNC: 2.4 G/DL — LOW (ref 3.3–5)
ALP SERPL-CCNC: 57 U/L — SIGNIFICANT CHANGE UP (ref 40–120)
ALT FLD-CCNC: 11 U/L — LOW (ref 12–78)
ANION GAP SERPL CALC-SCNC: 3 MMOL/L — LOW (ref 5–17)
AST SERPL-CCNC: 19 U/L — SIGNIFICANT CHANGE UP (ref 15–37)
BILIRUB SERPL-MCNC: 0.4 MG/DL — SIGNIFICANT CHANGE UP (ref 0.2–1.2)
BUN SERPL-MCNC: 13 MG/DL — SIGNIFICANT CHANGE UP (ref 7–23)
CALCIUM SERPL-MCNC: 9 MG/DL — SIGNIFICANT CHANGE UP (ref 8.5–10.1)
CHLORIDE SERPL-SCNC: 108 MMOL/L — SIGNIFICANT CHANGE UP (ref 96–108)
CHOLEST SERPL-MCNC: 192 MG/DL — SIGNIFICANT CHANGE UP
CO2 SERPL-SCNC: 30 MMOL/L — SIGNIFICANT CHANGE UP (ref 22–31)
CREAT SERPL-MCNC: 0.66 MG/DL — SIGNIFICANT CHANGE UP (ref 0.5–1.3)
ESTIMATED AVERAGE GLUCOSE: 91 MG/DL — SIGNIFICANT CHANGE UP (ref 68–114)
GLUCOSE SERPL-MCNC: 93 MG/DL — SIGNIFICANT CHANGE UP (ref 70–99)
HCT VFR BLD CALC: 26.7 % — LOW (ref 34.5–45)
HDLC SERPL-MCNC: 47 MG/DL — LOW
HGB BLD-MCNC: 8.1 G/DL — LOW (ref 11.5–15.5)
LIPID PNL WITH DIRECT LDL SERPL: 125 MG/DL — HIGH
MCHC RBC-ENTMCNC: 30.1 PG — SIGNIFICANT CHANGE UP (ref 27–34)
MCHC RBC-ENTMCNC: 30.3 GM/DL — LOW (ref 32–36)
MCV RBC AUTO: 99.3 FL — SIGNIFICANT CHANGE UP (ref 80–100)
NON HDL CHOLESTEROL: 145 MG/DL — HIGH
PLATELET # BLD AUTO: 323 K/UL — SIGNIFICANT CHANGE UP (ref 150–400)
POTASSIUM SERPL-MCNC: 4.3 MMOL/L — SIGNIFICANT CHANGE UP (ref 3.5–5.3)
POTASSIUM SERPL-SCNC: 4.3 MMOL/L — SIGNIFICANT CHANGE UP (ref 3.5–5.3)
PROT SERPL-MCNC: 5.5 GM/DL — LOW (ref 6–8.3)
RBC # BLD: 2.69 M/UL — LOW (ref 3.8–5.2)
RBC # FLD: 14.8 % — HIGH (ref 10.3–14.5)
SODIUM SERPL-SCNC: 141 MMOL/L — SIGNIFICANT CHANGE UP (ref 135–145)
TRIGL SERPL-MCNC: 100 MG/DL — SIGNIFICANT CHANGE UP
WBC # BLD: 4.75 K/UL — SIGNIFICANT CHANGE UP (ref 3.8–10.5)
WBC # FLD AUTO: 4.75 K/UL — SIGNIFICANT CHANGE UP (ref 3.8–10.5)

## 2022-01-09 PROCEDURE — 99231 SBSQ HOSP IP/OBS SF/LOW 25: CPT

## 2022-01-09 PROCEDURE — 93010 ELECTROCARDIOGRAM REPORT: CPT

## 2022-01-09 PROCEDURE — 99232 SBSQ HOSP IP/OBS MODERATE 35: CPT

## 2022-01-09 RX ORDER — SODIUM CHLORIDE 9 MG/ML
1000 INJECTION INTRAMUSCULAR; INTRAVENOUS; SUBCUTANEOUS
Refills: 0 | Status: DISCONTINUED | OUTPATIENT
Start: 2022-01-09 | End: 2022-01-09

## 2022-01-09 RX ORDER — IRON SUCROSE 20 MG/ML
200 INJECTION, SOLUTION INTRAVENOUS EVERY 24 HOURS
Refills: 0 | Status: COMPLETED | OUTPATIENT
Start: 2022-01-09 | End: 2022-01-11

## 2022-01-09 RX ADMIN — Medication 12.5 MILLIGRAM(S): at 09:34

## 2022-01-09 RX ADMIN — Medication 12.5 MILLIGRAM(S): at 21:11

## 2022-01-09 RX ADMIN — Medication 325 MILLIGRAM(S): at 09:34

## 2022-01-09 RX ADMIN — IRON SUCROSE 200 MILLIGRAM(S): 20 INJECTION, SOLUTION INTRAVENOUS at 14:20

## 2022-01-09 RX ADMIN — Medication 25 MICROGRAM(S): at 06:11

## 2022-01-09 RX ADMIN — Medication 1 TABLET(S): at 09:34

## 2022-01-09 RX ADMIN — Medication 500 MILLIGRAM(S): at 09:34

## 2022-01-09 RX ADMIN — SODIUM CHLORIDE 65 MILLILITER(S): 9 INJECTION INTRAMUSCULAR; INTRAVENOUS; SUBCUTANEOUS at 14:21

## 2022-01-09 NOTE — PROGRESS NOTE ADULT - ASSESSMENT
89 y/o female admitted on 1/8/21  with PMHx of HLD, hypothyroidism, macular degeneration, recent diagnosis of bladder mass - suspicion for bladder CA (due to have surgery on Monday, 1/10), in the interim continued to be anemic as she has been having hematuria since her diagnosis. Patient admitted to medicine service for further management of acute on chronic anemia likely due to probable bladder neoplasms.    Hematuria with 2 polypoid bladder masses  suspicious for bladder cancer .  - CT abd 12/11/21 - noted - bladder masses, adenomyomatosis of fundus of GB, T11  compression fx chronic  - Patient is due to have surgery on Monday 1/10, pre-op testing on Wednesday 1/5 revealing Hgb of 7, received blood transfusion and discharged home. Of note, patient has been experiencing persistent hematuria since her diagnosis.  - Urologist: Dr. Fonseca-  Surgery  planned for Monday  - cardiology clearance noted Dr. Burris - pt moderate risk for intermedium risk procedure   - Monitor H&H , Hb > 8 , s/p 1 unit PRBC on 1/5/21 , 1 unit PRBC today   - ECG - noted, CXR - abnormal opacity on left 4 th rib   - Keep NPO for OR ellen     Dizziness , multifactorial - Atrial fibrillation, Anemia, functional deconditioning  - anemia, a fib , deconditioning   - check orthostatics - neg   - 1 unit PRBC today   - c/w low dose of BB   - pt not on AC due to hematuria    Acute on chronic acute blood loss anemia , Iron deficiency   - Multifactorial ~ due to hematuria from probable bladder neoplasms and iron deficiency   - S/p 1U of PRBCs on 1/5, patient sent to hospital for anemia, received a blood transfusion and discharged home.  - Monitor H&H, stable. check B12   - IV iron, with Vitamin C    Abnormal CXR 1.8 x 0.7 cm overlies opacity overlies LEFT anterior fourth rib of indeterminate etiology.  - asymptomatic   - may need CT chest - can be done as o/p     Hypothyroidism, under controlled with TSH 8   - Patient started on synthroid 12/11   - C/w synthroid 25mcg, repeat TFTs in 3-4 weeks  as o/p   - check free T4    HLD with     - Patient previously on lovastatin, has not renewed prescription in several months , PCP stop statins as per family    Generalized weakness, Chronic T11 compression fracture  - PT evaluation, check vitamin D, B12 level     Probable adenomyomatosis at the gallbladder fundus on CT 12/11/21 - o/p work-up and monitoring     Hypoalbuminemia. Malnutrition - Nutrition eval ,  Added ensure w/ meals, MVI daily       GOC / Advanced Care Planning:   - Spoke w/ Son Bill (HCP) [phone]  updated 1/9/22   - FULL CODE     DVT ppx - SCDs in setting of anemia        Dispo: Inpatient. Med- Surg. NPO on Sunday PM for OR on Monday.

## 2022-01-09 NOTE — DIETITIAN NUTRITION RISK NOTIFICATION - ADDITIONAL COMMENTS/DIETITIAN RECOMMENDATIONS
regular diet to maximize nutritional and caloric intake  Record PO intake in EMR after each meal (nursing.)    Ensure enlive 8 oz tid  Gelatein bid  Record PO intake in EMR after each meal (nursing.)   Zn sulfate 220 mg/day x 7 days  Monitor PO intake, tolerance, labs and weight.

## 2022-01-09 NOTE — DIETITIAN NUTRITION RISK NOTIFICATION - TREATMENT: THE FOLLOWING DIET HAS BEEN RECOMMENDED
Diet, DASH/TLC:   Sodium & Cholesterol Restricted  Prosource Gelatein Plus     Qty per Day:  2  Supplement Feeding Modality:  Oral  Ensure Enlive Cans or Servings Per Day:  1       Frequency:  Two Times a day (01-08-22 @ 13:58) [Active]

## 2022-01-09 NOTE — DIETITIAN INITIAL EVALUATION ADULT. - OTHER INFO
89 y/o female with PMHx of HLD, hypothyroidism, macular degeneration, recent diagnosis of bladder mass - suspicion for bladder CA (due to have surgery on Monday, 1/10), in the interim continued to be anemic as she has been having hematuria since her diagnosis. Pt went for pre-op testing on Wednesday and was found to be anemic, and was brought to the hospital, where she received 1 unit of PRBCs. Patient's daughter states she received phone call yesterday saying that patient's hemoglobin was 7 and to bring to the hospital for reevaluation.  ~ Of note, patient with recent admission from 12/11 to 12/14, during that admission patient found to have on CT scan 2 polypoid soft tissue nodules in bladder - a nodule along the left posterior wall measures 3.7 x 2.7 x 2.0 cm and a nodule at the base of the bladder measures 1.2 x 1.4 x 1.8 cm.  ~ Seen and examined in ED. Used Malay  services, patient denies symptoms at this time. Weakness and depression reported per son. Son Fei [phone] or Errol  [phone] requesting to use them for interpretation, refusing  services. 89 y/o female with PMHx of HLD, hypothyroidism, macular degeneration, recent diagnosis of bladder mass - suspicion for bladder CA (due to have surgery on Monday, 1/10), in the interim continued to be anemic as she has been having hematuria since her diagnosis. Pt went for pre-op testing on Wednesday and was found to be anemic, and was brought to the hospital, where she received 1 unit of PRBCs. Patient's daughter states she received phone call yesterday saying that patient's hemoglobin was 7 and to bring to the hospital for reevaluation.  ~ Of note, patient with recent admission from 12/11 to 12/14, during that admission patient found to have on CT scan 2 polypoid soft tissue nodules in bladder - a nodule along the left posterior wall measures 3.7 x 2.7 x 2.0 cm and a nodule at the base of the bladder measures 1.2 x 1.4 x 1.8 cm.  ~ Seen and examined in ED. Used Occitan  services, patient denies symptoms at this time. Weakness and depression reported per son. Son Fei [phone] or Errol  [phone] requesting to use them for interpretation, refusing  services.  Pt did not want to rouse, NFPE performed without issue.  Pt is very thin.   Weigh corrected to 57 kg (bed weight)  Suggest GOC consult to discuss feasibility of nutritional support  Pt on MVI, vit C  Suggest add Zn Sulfate   Nursing reports that pt did not want to eat lunch.

## 2022-01-09 NOTE — DIETITIAN INITIAL EVALUATION ADULT. - PERTINENT MEDS FT
MEDICATIONS  (STANDING):  ascorbic acid 500 milliGRAM(s) Oral daily  ferrous    sulfate 325 milliGRAM(s) Oral two times a day  levoFLOXacin  Tablet 500 milliGRAM(s) Oral every 24 hours  levothyroxine 25 MICROGram(s) Oral daily  metoprolol tartrate 12.5 milliGRAM(s) Oral two times a day  multivitamin 1 Tablet(s) Oral daily    MEDICATIONS  (PRN):  acetaminophen     Tablet .. 650 milliGRAM(s) Oral every 6 hours PRN Temp greater or equal to 38C (100.4F), Mild Pain (1 - 3)  melatonin 3 milliGRAM(s) Oral at bedtime PRN Insomnia  ondansetron Injectable 4 milliGRAM(s) IV Push every 8 hours PRN Nausea and/or Vomiting

## 2022-01-09 NOTE — DIETITIAN INITIAL EVALUATION ADULT. - NAME AND PHONE
Zahraa Collins RDN, CDN, [facility]      [phone]   [email or web address]

## 2022-01-09 NOTE — CONSULT NOTE ADULT - ASSESSMENT
A/P: 89 y/o female with PMHx of HLD, hypothyroidism, macular degeneration, recent diagnosis of bladder mass - suspicion for bladder CA (due to have surgery on Monday, 1/10), in the interim continued to be anemic as she has been having hematuria since her diagnosis. Patient with recent admission from 12/11 to 12/14, during that admission patient found to have on CT scan 2 polypoid soft tissue nodules in bladder - a nodule along the left posterior wall measures 3.7 x 2.7 x 2.0 cm and a nodule at the base of the bladder measures 1.2 x 1.4 x 1.8 cm.    1. Preop optimization. Pt is optimized from a cardiac standpoint to proceed with bladder tumor removal surgery.  Pt is moderate risk for an intermediate risk surgery.  No CP/SOB. No signs of decompensation at present.   Recent echo in 2020 showed a preserved LVEF with no severe valvular heart disease done as outpt.    2. Dyslipidemia. Cont statin.     3. Anemia. Cont to transfuse as per urology.     4. Abnormal CXR. May need dedicated CT chest. Mgmt as per medicine.    5. Mechanical DVT proph.
89 yo F with for TURBT tomorrow for gross hematuria, suspected bladder mass.   - NPO after midnight  - medical optimization  - transfuse PRN

## 2022-01-09 NOTE — CONSULT NOTE ADULT - SUBJECTIVE AND OBJECTIVE BOX
hpHPI: The patient is a 89 yo female with Hx. of HLD, Macular degeneration who presented in the ED for anemia, preop optimization. Pt is scheduled for TURBT Monday 1/10/2021 and was found to have HGB 7 on preop testing. She was asked by Dr Klein to come to hospital for possible transfusion, medical optimization. In ER on admission, found to have Hgb of 8.4. Seen and examined at bedside today, no complaints.     PMHx:  HLD, Macular degeneration     PSHx: denies surgeries    Family Hx: both parents lived ot 100,     Social Hx.: not smoking, no alcohol use    ROS: negative for all except what is noted in HPI    Vital Signs Last 24 Hrs  T(C): 37.3 (09 Jan 2022 09:00), Max: 37.3 (09 Jan 2022 09:00)  T(F): 99.1 (09 Jan 2022 09:00), Max: 99.1 (09 Jan 2022 09:00)  HR: 94 (09 Jan 2022 09:00) (66 - 100)  BP: 118/63 (09 Jan 2022 09:00) (106/46 - 121/58)  BP(mean): --  RR: 18 (09 Jan 2022 09:00) (18 - 18)  SpO2: 97% (09 Jan 2022 09:00) (97% - 100%)    NAD  RRR  no increased WOB  ABD S NT ND  no SP tenderness  no CVA tenderness                          8.1    4.75  )-----------( 323      ( 09 Jan 2022 07:19 )             26.7     01-09    141  |  108  |  13  ----------------------------<  93  4.3   |  30  |  0.66    Ca    9.0      09 Jan 2022 07:19    TPro  5.5<L>  /  Alb  2.4<L>  /  TBili  0.4  /  DBili  x   /  AST  19  /  ALT  11<L>  /  AlkPhos  57  01-09  
Cardiology Consultation    HPI: 89 y/o female with PMHx of HLD, hypothyroidism, macular degeneration, recent diagnosis of bladder mass - suspicion for bladder CA (due to have surgery on Monday, 1/10), in the interim continued to be anemic as she has been having hematuria since her diagnosis. Pt went for pre-op testing on Wednesday and was found to be anemic, and was brought to the hospital, where she received 1 unit of PRBCs. Patient's daughter states she received phone call yesterday saying that patient's hemoglobin was 7 and to bring to the hospital for reevaluation.  ~ Of note, patient with recent admission from  to , during that admission patient found to have on CT scan 2 polypoid soft tissue nodules in bladder - a nodule along the left posterior wall measures 3.7 x 2.7 x 2.0 cm and a nodule at the base of the bladder measures 1.2 x 1.4 x 1.8 cm.  ~ Seen and examined in ED. Used [facility]  services, patient denies symptoms at this time. Weakness and depression reported per son. Son Fei [phone] or [facility]  [phone] requesting to use them for interpretation, refusing  services.  (2022 12:41)    . Pt seen/examined on 1N. Called for cardiology clearance prior to bladder mass removal surgery.   Pt was cleared by Dr. Cannon as outpt 1/3.   No CP/SOB. Not on tele.   S/p pRBC for anemia.    PAST MEDICAL & SURGICAL HISTORY:  HLD (hyperlipidemia)  Malignant neoplasm of bladder  Macular degeneration  Hypothyroid    Allergies  No Known Allergies    SOCIAL HISTORY: Denies tobacco, etoh abuse or illicit drug use    FAMILY HISTORY:  Family history unobtainable due to patient&#x27;s condition    MEDICATIONS  (STANDING):  ascorbic acid 500 milliGRAM(s) Oral daily  enoxaparin Injectable 40 milliGRAM(s) SubCutaneous daily  ferrous    sulfate 325 milliGRAM(s) Oral two times a day  levoFLOXacin  Tablet 500 milliGRAM(s) Oral every 24 hours  levothyroxine 25 MICROGram(s) Oral daily  metoprolol tartrate 12.5 milliGRAM(s) Oral two times a day  multivitamin 1 Tablet(s) Oral daily    MEDICATIONS  (PRN):  acetaminophen     Tablet .. 650 milliGRAM(s) Oral every 6 hours PRN Temp greater or equal to 38C (100.4F), Mild Pain (1 - 3)  melatonin 3 milliGRAM(s) Oral at bedtime PRN Insomnia  ondansetron Injectable 4 milliGRAM(s) IV Push every 8 hours PRN Nausea and/or Vomiting    Vital Signs Last 24 Hrs  T(C): 36.8 (2022 21:19), Max: 37.1 (2022 14:00)  T(F): 98.3 (2022 21:19), Max: 98.7 (2022 14:00)  HR: 71 (2022 21:19) (64 - 100)  BP: 106/46 (2022 21:19) (106/46 - 121/58)  BP(mean): 76 (2022 11:33) (71 - 76)  RR: 18 (2022 21:19) (15 - 18)  SpO2: 100% (2022 21:19) (100% - 100%)    REVIEW OF SYSTEMS:    CONSTITUTIONAL:  As per HPI. Anemic, weak.  HEENT:  Eyes:  No diplopia or blurred vision. ENT:  No earache, sore throat or runny nose.  CARDIOVASCULAR:  No pressure, squeezing, strangling, tightness, heaviness or aching about the chest, neck, axilla or epigastrium.  RESPIRATORY:  No cough, shortness of breath, PND or orthopnea.  GASTROINTESTINAL:  No nausea, vomiting or diarrhea.  GENITOURINARY:  No dysuria, frequency or urgency.  MUSCULOSKELETAL:  As per HPI.  SKIN:  No change in skin, hair or nails.  NEUROLOGIC:  No paresthesias, fasciculations, seizures or weakness.    PHYSICAL EXAMINATION:    GENERAL APPEARANCE:  Pt. is not currently dyspneic, in no distress. Pt. is alert, oriented, and pleasant.  HEENT:  Pupils are normal and react normally. No icterus. Mucous membranes well colored.  NECK:  Supple. No lymphadenopathy. Jugular venous pressure not elevated. Carotids equal.   HEART:   The cardiac impulse has a normal quality. There are no murmurs, rubs or gallops noted  CHEST:  Chest is clear to auscultation. Normal respiratory effort.  ABDOMEN:  Soft and nontender.   EXTREMITIES:  There is no edema.     LABS:                        8.1    4.75  )-----------( 323      ( 2022 07:19 )             26.7         142  |  109<H>  |  14  ----------------------------<  122<H>  3.8   |  30  |  0.73    Ca    9.0      2022 10:05    TPro  5.9<L>  /  Alb  2.6<L>  /  TBili  0.4  /  DBili  x   /  AST  19  /  ALT  13  /  AlkPhos  60      LIVER FUNCTIONS - ( 2022 10:05 )  Alb: 2.6 g/dL / Pro: 5.9 gm/dL / ALK PHOS: 60 U/L / ALT: 13 U/L / AST: 19 U/L / GGT: x           PT/INR - ( 2022 10:05 )   PT: 12.7 sec;   INR: 1.10 ratio       PTT - ( 2022 10:05 )  PTT:33.0 sec    Urinalysis Basic - ( 2022 17:25 )    Color: Katie / Appearance: very cloudy / S.020 / pH: x  Gluc: x / Ketone: Trace  / Bili: Negative / Urobili: Negative mg/dL   Blood: x / Protein: 100 mg/dL / Nitrite: Negative   Leuk Esterase: Negative / RBC: >50 /HPF / WBC 3-5   Sq Epi: x / Non Sq Epi: Occasional / Bacteria: Moderate    EKG: < from: 12 Lead ECG (22 @ 16:58) >  Sinus rhythm with 1st degree A-V block with Premature atrial complexes with Aberrant conduction  Nonspecific T wave abnormality  Abnormal ECG  When compared with ECG of 29-DEC-2021 14:14,  Sinus rhythm has replaced Atrial fibrillation    TELEMETRY: Not on tele.     CARDIAC TESTS: None    RADIOLOGY & ADDITIONAL STUDIES: < from: Xray Chest 1 View- PORTABLE-Urgent (Xray Chest 1 View- PORTABLE-Urgent .) (22 @ 12:37) >  IMPRESSION: . LEFT mid zone opacity of indeterminate etiology.  Follow-up radiographs or CT recommended.    < end of copied text >  < from: CT Abdomen and Pelvis w/ IV Cont (21 @ 12:59) >  IMPRESSION:  2 polypoid bladder masses suspicious for multifocal bladder neoplasm.    ASSESSMENT & PLAN:

## 2022-01-09 NOTE — DIETITIAN INITIAL EVALUATION ADULT. - ADD RECOMMEND
Record PO intake in EMR after each meal (nursing.) [facility] meeting suggested.  Zn sulfate 220 mg/day x 7 days.  Monitor PO intake, tolerance, labs and weight.

## 2022-01-09 NOTE — DIETITIAN INITIAL EVALUATION ADULT. - PERTINENT LABORATORY DATA
01-09    141  |  108  |  13  ----------------------------<  93  4.3   |  30  |  0.66    Ca    9.0      09 Jan 2022 07:19    TPro  5.5<L>  /  Alb  2.4<L>  /  TBili  0.4  /  DBili  x   /  AST  19  /  ALT  11<L>  /  AlkPhos  57  01-09

## 2022-01-10 ENCOUNTER — RESULT REVIEW (OUTPATIENT)
Age: 87
End: 2022-01-10

## 2022-01-10 ENCOUNTER — APPOINTMENT (OUTPATIENT)
Dept: UROLOGY | Facility: HOSPITAL | Age: 87
End: 2022-01-10

## 2022-01-10 LAB
24R-OH-CALCIDIOL SERPL-MCNC: 70.4 NG/ML — SIGNIFICANT CHANGE UP (ref 30–80)
ALBUMIN SERPL ELPH-MCNC: 2.9 G/DL — LOW (ref 3.3–5)
ALP SERPL-CCNC: 65 U/L — SIGNIFICANT CHANGE UP (ref 40–120)
ALT FLD-CCNC: 14 U/L — SIGNIFICANT CHANGE UP (ref 12–78)
ANION GAP SERPL CALC-SCNC: 6 MMOL/L — SIGNIFICANT CHANGE UP (ref 5–17)
AST SERPL-CCNC: 21 U/L — SIGNIFICANT CHANGE UP (ref 15–37)
BILIRUB SERPL-MCNC: 0.6 MG/DL — SIGNIFICANT CHANGE UP (ref 0.2–1.2)
BUN SERPL-MCNC: 13 MG/DL — SIGNIFICANT CHANGE UP (ref 7–23)
CALCIUM SERPL-MCNC: 9.3 MG/DL — SIGNIFICANT CHANGE UP (ref 8.5–10.1)
CHLORIDE SERPL-SCNC: 107 MMOL/L — SIGNIFICANT CHANGE UP (ref 96–108)
CO2 SERPL-SCNC: 26 MMOL/L — SIGNIFICANT CHANGE UP (ref 22–31)
CREAT SERPL-MCNC: 0.66 MG/DL — SIGNIFICANT CHANGE UP (ref 0.5–1.3)
CULTURE RESULTS: NO GROWTH — SIGNIFICANT CHANGE UP
FERRITIN SERPL-MCNC: 214 NG/ML — HIGH (ref 15–150)
GLUCOSE SERPL-MCNC: 105 MG/DL — HIGH (ref 70–99)
HCT VFR BLD CALC: 34.4 % — LOW (ref 34.5–45)
HGB BLD-MCNC: 10.6 G/DL — LOW (ref 11.5–15.5)
MAGNESIUM SERPL-MCNC: 2.4 MG/DL — SIGNIFICANT CHANGE UP (ref 1.6–2.6)
MCHC RBC-ENTMCNC: 29.4 PG — SIGNIFICANT CHANGE UP (ref 27–34)
MCHC RBC-ENTMCNC: 30.8 GM/DL — LOW (ref 32–36)
MCV RBC AUTO: 95.6 FL — SIGNIFICANT CHANGE UP (ref 80–100)
PHOSPHATE SERPL-MCNC: 3.2 MG/DL — SIGNIFICANT CHANGE UP (ref 2.5–4.5)
PLATELET # BLD AUTO: 396 K/UL — SIGNIFICANT CHANGE UP (ref 150–400)
POTASSIUM SERPL-MCNC: 4 MMOL/L — SIGNIFICANT CHANGE UP (ref 3.5–5.3)
POTASSIUM SERPL-SCNC: 4 MMOL/L — SIGNIFICANT CHANGE UP (ref 3.5–5.3)
PROT SERPL-MCNC: 6.5 GM/DL — SIGNIFICANT CHANGE UP (ref 6–8.3)
RBC # BLD: 3.6 M/UL — LOW (ref 3.8–5.2)
RBC # FLD: 15.3 % — HIGH (ref 10.3–14.5)
SODIUM SERPL-SCNC: 139 MMOL/L — SIGNIFICANT CHANGE UP (ref 135–145)
SPECIMEN SOURCE: SIGNIFICANT CHANGE UP
T4 FREE SERPL-MCNC: 1.22 NG/DL — SIGNIFICANT CHANGE UP (ref 0.76–1.46)
VIT B12 SERPL-MCNC: 1759 PG/ML — HIGH (ref 232–1245)
WBC # BLD: 6.53 K/UL — SIGNIFICANT CHANGE UP (ref 3.8–10.5)
WBC # FLD AUTO: 6.53 K/UL — SIGNIFICANT CHANGE UP (ref 3.8–10.5)

## 2022-01-10 PROCEDURE — 51720 TREATMENT OF BLADDER LESION: CPT | Mod: 59

## 2022-01-10 PROCEDURE — 99232 SBSQ HOSP IP/OBS MODERATE 35: CPT

## 2022-01-10 PROCEDURE — 52240 CYSTOSCOPY AND TREATMENT: CPT

## 2022-01-10 PROCEDURE — 88307 TISSUE EXAM BY PATHOLOGIST: CPT | Mod: 26

## 2022-01-10 RX ORDER — MITOMYCIN 5 MG/10ML
40 INJECTION, POWDER, LYOPHILIZED, FOR SOLUTION INTRAVENOUS ONCE
Refills: 0 | Status: DISCONTINUED | OUTPATIENT
Start: 2022-01-10 | End: 2022-01-13

## 2022-01-10 RX ORDER — SODIUM CHLORIDE 9 MG/ML
1000 INJECTION, SOLUTION INTRAVENOUS
Refills: 0 | Status: DISCONTINUED | OUTPATIENT
Start: 2022-01-10 | End: 2022-01-11

## 2022-01-10 RX ORDER — ONDANSETRON 8 MG/1
4 TABLET, FILM COATED ORAL ONCE
Refills: 0 | Status: DISCONTINUED | OUTPATIENT
Start: 2022-01-10 | End: 2022-01-13

## 2022-01-10 RX ORDER — PHENAZOPYRIDINE HCL 100 MG
100 TABLET ORAL EVERY 8 HOURS
Refills: 0 | Status: DISCONTINUED | OUTPATIENT
Start: 2022-01-10 | End: 2022-01-13

## 2022-01-10 RX ORDER — OXYCODONE HYDROCHLORIDE 5 MG/1
5 TABLET ORAL ONCE
Refills: 0 | Status: DISCONTINUED | OUTPATIENT
Start: 2022-01-10 | End: 2022-01-13

## 2022-01-10 RX ORDER — FENTANYL CITRATE 50 UG/ML
50 INJECTION INTRAVENOUS
Refills: 0 | Status: DISCONTINUED | OUTPATIENT
Start: 2022-01-10 | End: 2022-01-13

## 2022-01-10 RX ADMIN — Medication 100 MILLIGRAM(S): at 17:23

## 2022-01-10 RX ADMIN — IRON SUCROSE 200 MILLIGRAM(S): 20 INJECTION, SOLUTION INTRAVENOUS at 13:52

## 2022-01-10 RX ADMIN — Medication 1 TABLET(S): at 10:30

## 2022-01-10 RX ADMIN — Medication 12.5 MILLIGRAM(S): at 10:30

## 2022-01-10 RX ADMIN — Medication 500 MILLIGRAM(S): at 10:30

## 2022-01-10 NOTE — PROGRESS NOTE ADULT - ASSESSMENT
89 y/o female admitted on 1/8/21  with PMHx of HLD, hypothyroidism, macular degeneration, recent diagnosis of bladder mass - suspicion for bladder CA (due to have surgery on Monday, 1/10), in the interim continued to be anemic as she has been having hematuria since her diagnosis. Patient admitted to medicine service for further management of acute on chronic anemia likely due to probable bladder neoplasms.    Hematuria with 2 polypoid bladder masses  suspicious for bladder cancer .  - CT abd 12/11/21 - noted - bladder masses, adenomyomatosis of fundus of GB, T11  compression fx chronic  - Patient is due to have surgery on Monday 1/10, pre-op testing on Wednesday 1/5 revealing Hgb of 7, received blood transfusion and discharged home. Of note, patient has been experiencing persistent hematuria since her diagnosis.  - Urologist: Dr. Fonseca-  Surgery  planned for Monday  - cardiology clearance noted Dr. Burris - pt moderate risk for intermedium risk procedure   - Monitor H&H , Hb > 8 , s/p 1 unit PRBC on 1/5/21 , s/p 1 unit PRBC  on 1/9/21   - ECG - noted, CXR - abnormal opacity on left 4 th rib   - Keep NPO for OR ellen     Dizziness , multifactorial - Atrial fibrillation, Anemia, functional deconditioning  - anemia, a fib , deconditioning   - check orthostatics - neg , s/p 1 unit PRBC    - c/w low dose of BB   - pt not on AC due to hematuria     Acute on chronic acute blood loss anemia , Iron deficiency   - Multifactorial ~ due to hematuria from probable bladder neoplasms and iron deficiency   - S/p 1U of PRBCs on 1/5, patient sent to hospital for anemia, received a blood transfusion and discharged home.  - Monitor H&H, stable. check B12 wnl   - IV iron, with Vitamin C    Abnormal CXR 1.8 x 0.7 cm overlies opacity overlies LEFT anterior fourth rib of indeterminate etiology.  - asymptomatic   - may need CT chest - can be done as o/p     Hypothyroidism, under controlled with TSH 8   - Patient started on synthroid 12/11   - C/w synthroid 25mcg, repeat TFTs in 3-4 weeks  as o/p   - check free T4    HLD with     - Patient previously on lovastatin, has not renewed prescription in several months , PCP stop statins as per family    Generalized weakness, Chronic T11 compression fracture  - PT evaluation, check vitamin D, B12 level     Probable adenomyomatosis at the gallbladder fundus on CT 12/11/21 - o/p work-up and monitoring     Hypoalbuminemia. Malnutrition - Nutrition eval ,  Added ensure w/ meals, MVI daily       GOC / Advanced Care Planning:   - Spoke w/ Son Fei (HCP) [phone]  updated 1/9/22 , 1/10   - FULL CODE     DVT ppx - SCDs in setting of anemia        Dispo: Inpatient. Med- Surg. NPO on Sunday PM for OR today , d/c planning family requesting  [facility]

## 2022-01-10 NOTE — PROGRESS NOTE ADULT - ASSESSMENT
A/P: 89 y/o female with PMHx of HLD, hypothyroidism, macular degeneration, recent diagnosis of bladder mass - suspicion for bladder CA (due to have surgery on Monday, 1/10), in the interim continued to be anemic as she has been having hematuria since her diagnosis. Patient with recent admission from 12/11 to 12/14, during that admission patient found to have on CT scan 2 polypoid soft tissue nodules in bladder - a nodule along the left posterior wall measures 3.7 x 2.7 x 2.0 cm and a nodule at the base of the bladder measures 1.2 x 1.4 x 1.8 cm.    1. Preop optimization. Pt is optimized from a cardiac standpoint to proceed with bladder tumor removal surgery.  Pt is moderate risk for an intermediate risk surgery.  No CP/SOB. No signs of decompensation at present.   Recent echo in 2020 showed a preserved LVEF with no severe valvular heart disease done as outpt.    2. Dyslipidemia. Cont statin.     3. Anemia. Cont to transfuse as per urology.     4. Abnormal CXR. May need dedicated CT chest. Mgmt as per medicine.    5. Mechanical DVT proph.

## 2022-01-10 NOTE — BRIEF OPERATIVE NOTE - NSICDXBRIEFPOSTOP_GEN_ALL_CORE_FT
POST-OP DIAGNOSIS:  Bladder tumor 10-Alexandre-2022 16:28:32  Wesley Klein  Gross hematuria 10-Alexandre-2022 16:28:27  Wesley Klein

## 2022-01-10 NOTE — BRIEF OPERATIVE NOTE - NSICDXBRIEFPREOP_GEN_ALL_CORE_FT
PRE-OP DIAGNOSIS:  Gross hematuria 10-Alexandre-2022 16:28:16  Wesley Klein  Bladder tumor 10-Alexandre-2022 16:28:23  Wesley Klein

## 2022-01-10 NOTE — BRIEF OPERATIVE NOTE - OPERATION/FINDINGS
1. A very large papillary tumor occupying left floor, bladder neck and lateral wall of bladder > 5 cm   2. Small papillary tumor along right floor of bladder

## 2022-01-11 DIAGNOSIS — N32.89 OTHER SPECIFIED DISORDERS OF BLADDER: ICD-10-CM

## 2022-01-11 LAB
ANION GAP SERPL CALC-SCNC: 4 MMOL/L — LOW (ref 5–17)
BUN SERPL-MCNC: 16 MG/DL — SIGNIFICANT CHANGE UP (ref 7–23)
CALCIUM SERPL-MCNC: 8.8 MG/DL — SIGNIFICANT CHANGE UP (ref 8.5–10.1)
CHLORIDE SERPL-SCNC: 110 MMOL/L — HIGH (ref 96–108)
CO2 SERPL-SCNC: 29 MMOL/L — SIGNIFICANT CHANGE UP (ref 22–31)
CREAT SERPL-MCNC: 0.66 MG/DL — SIGNIFICANT CHANGE UP (ref 0.5–1.3)
GLUCOSE SERPL-MCNC: 100 MG/DL — HIGH (ref 70–99)
HCT VFR BLD CALC: 34.5 % — SIGNIFICANT CHANGE UP (ref 34.5–45)
HGB BLD-MCNC: 10.9 G/DL — LOW (ref 11.5–15.5)
MCHC RBC-ENTMCNC: 30.9 PG — SIGNIFICANT CHANGE UP (ref 27–34)
MCHC RBC-ENTMCNC: 31.6 GM/DL — LOW (ref 32–36)
MCV RBC AUTO: 97.7 FL — SIGNIFICANT CHANGE UP (ref 80–100)
PLATELET # BLD AUTO: 334 K/UL — SIGNIFICANT CHANGE UP (ref 150–400)
POTASSIUM SERPL-MCNC: 3.7 MMOL/L — SIGNIFICANT CHANGE UP (ref 3.5–5.3)
POTASSIUM SERPL-SCNC: 3.7 MMOL/L — SIGNIFICANT CHANGE UP (ref 3.5–5.3)
RBC # BLD: 3.53 M/UL — LOW (ref 3.8–5.2)
RBC # FLD: 14.9 % — HIGH (ref 10.3–14.5)
SODIUM SERPL-SCNC: 143 MMOL/L — SIGNIFICANT CHANGE UP (ref 135–145)
WBC # BLD: 6.56 K/UL — SIGNIFICANT CHANGE UP (ref 3.8–10.5)
WBC # FLD AUTO: 6.56 K/UL — SIGNIFICANT CHANGE UP (ref 3.8–10.5)

## 2022-01-11 PROCEDURE — 99232 SBSQ HOSP IP/OBS MODERATE 35: CPT

## 2022-01-11 RX ADMIN — Medication 25 MICROGRAM(S): at 05:50

## 2022-01-11 RX ADMIN — SODIUM CHLORIDE 125 MILLILITER(S): 9 INJECTION, SOLUTION INTRAVENOUS at 00:24

## 2022-01-11 RX ADMIN — Medication 100 MILLIGRAM(S): at 00:49

## 2022-01-11 RX ADMIN — Medication 500 MILLIGRAM(S): at 09:04

## 2022-01-11 RX ADMIN — Medication 12.5 MILLIGRAM(S): at 21:26

## 2022-01-11 RX ADMIN — Medication 100 MILLIGRAM(S): at 09:03

## 2022-01-11 RX ADMIN — Medication 100 MILLIGRAM(S): at 21:26

## 2022-01-11 RX ADMIN — Medication 1 TABLET(S): at 09:04

## 2022-01-11 RX ADMIN — Medication 650 MILLIGRAM(S): at 01:45

## 2022-01-11 RX ADMIN — Medication 100 MILLIGRAM(S): at 15:18

## 2022-01-11 RX ADMIN — Medication 12.5 MILLIGRAM(S): at 09:05

## 2022-01-11 RX ADMIN — Medication 650 MILLIGRAM(S): at 00:49

## 2022-01-11 RX ADMIN — IRON SUCROSE 200 MILLIGRAM(S): 20 INJECTION, SOLUTION INTRAVENOUS at 15:19

## 2022-01-11 RX ADMIN — Medication 3 MILLIGRAM(S): at 23:05

## 2022-01-11 NOTE — PHYSICAL THERAPY INITIAL EVALUATION ADULT - DIAGNOSIS, PT EVAL
Hematuria, anemia, bladder masses, bladder ca?
Hematuria with 2 polypoid bladder masses  suspicious for bladder cancer, anemia, S/P bladder tumor resection

## 2022-01-11 NOTE — PHYSICAL THERAPY INITIAL EVALUATION ADULT - PERTINENT HX OF CURRENT PROBLEM, REHAB EVAL
Pt admitted to  secondary to hematuria and anemia. COVID 19: neg. H/H- 10.6/34.4.
hematuria, anemia, bladder mass x 2- s/p resection 1/10

## 2022-01-11 NOTE — PROGRESS NOTE ADULT - ASSESSMENT
89 y/o female admitted on 1/8/21  with PMHx of HLD, hypothyroidism, macular degeneration, recent diagnosis of bladder mass - suspicion for bladder CA (due to have surgery on Monday, 1/10), in the interim continued to be anemic as she has been having hematuria since her diagnosis. Patient admitted to medicine service for further management of acute on chronic anemia likely due to probable bladder neoplasms.    Hematuria with 2 polypoid bladder masses  suspicious for bladder cancer s/p surgical removal on 1/10 s/p Brandt   - CT abd 12/11/21 - noted - bladder masses, adenomyomatosis of fundus of GB, T11  compression fx chronic  - Patient is due to have surgery on Monday 1/10, pre-op testing on Wednesday 1/5 revealing Hgb of 7, received blood transfusion and discharged home. Of note, patient has been experiencing persistent hematuria since her diagnosis.  - Urologist: Dr. Fonseca-  Surgery  planned for Monday  - cardiology clearance noted Dr. Burris - pt moderate risk for intermedium risk procedure   - Monitor H&H , Hb > 8 , s/p 1 unit PRBC on 1/5/21 , s/p 1 unit PRBC  on 1/9/21   - ECG - noted, CXR - abnormal opacity on left 4 th rib   - 1/10- s/p Transurethral surgical removal or destruction of large neoplasm or multiple neoplasms of bladder for more than 30 minutes Instillation of mitomycin C into bladder  - pathology pending   - Brandt care, plan for voiding trial ellen d/w Dr. Klein    Dizziness , multifactorial - Atrial fibrillation, Anemia, functional deconditioning  - anemia, a fib , deconditioning   - check orthostatics - neg , s/p 1 unit PRBC    - c/w low dose of BB   - pt not on AC due to hematuria     Acute on chronic acute blood loss anemia , Iron deficiency   - Multifactorial ~ due to hematuria from probable bladder neoplasms and iron deficiency   - S/p 1U of PRBCs on 1/5, patient sent to hospital for anemia, received a blood transfusion and discharged home.  - Monitor H&H, stable. check B12 wnl   - IV iron, with Vitamin C    Abnormal CXR 1.8 x 0.7 cm overlies opacity overlies LEFT anterior fourth rib of indeterminate etiology.  - asymptomatic   - may need CT chest - can be done as o/p     Hypothyroidism, under controlled with TSH 8   - Patient started on synthroid 12/11   - C/w synthroid 25mcg, repeat TFTs in 3-4 weeks  as o/p   - check free T4    HLD with     - Patient previously on lovastatin, has not renewed prescription in several months , PCP stop statins as per family    Generalized weakness, Chronic T11 compression fracture  - PT evaluation, check vitamin D, B12 level     Probable adenomyomatosis at the gallbladder fundus on CT 12/11/21 - o/p work-up and monitoring     Hypoalbuminemia. Malnutrition - Nutrition eval ,  Added ensure w/ meals, MVI daily       GOC / Advanced Care Planning:  -  Spoke w/ Son Fei (HCP) [phone]  updated 1/9/22 , 1/10 , 1/11  -  FULL CODE     DVT ppx - SCDs in setting of anemia        Dispo:  d/c planning family requesting  [facility]

## 2022-01-11 NOTE — PROGRESS NOTE ADULT - ATTENDING COMMENTS
Urine orange tinged.     Can DC Brandt in the AM.   Pathology pending.   Can be discharged when stable.

## 2022-01-11 NOTE — PROGRESS NOTE ADULT - ASSESSMENT
A/P: 89y Female s/p TURB-T  DVT prophylaxis/OOB  Incentive spirometry  Strict I&O's  Cont guillermo for now  Ck AM labs     A/P: 89y Female s/p TURB-T  DVT prophylaxis/OOB  Incentive spirometry  Strict I&O's  Cont li for now  Ck AM labs  Above discussed with Dr. Klein

## 2022-01-12 LAB
ANION GAP SERPL CALC-SCNC: 3 MMOL/L — LOW (ref 5–17)
BUN SERPL-MCNC: 11 MG/DL — SIGNIFICANT CHANGE UP (ref 7–23)
CALCIUM SERPL-MCNC: 9.2 MG/DL — SIGNIFICANT CHANGE UP (ref 8.5–10.1)
CHLORIDE SERPL-SCNC: 109 MMOL/L — HIGH (ref 96–108)
CO2 SERPL-SCNC: 29 MMOL/L — SIGNIFICANT CHANGE UP (ref 22–31)
COVID-19 NUCLEOCAPSID GAM AB INTERP: NEGATIVE — SIGNIFICANT CHANGE UP
COVID-19 NUCLEOCAPSID TOTAL GAM ANTIBODY RESULT: 0.11 INDEX — SIGNIFICANT CHANGE UP
COVID-19 SPIKE DOMAIN AB INTERP: POSITIVE
COVID-19 SPIKE DOMAIN ANTIBODY RESULT: >250 U/ML — HIGH
CREAT SERPL-MCNC: 0.59 MG/DL — SIGNIFICANT CHANGE UP (ref 0.5–1.3)
GLUCOSE SERPL-MCNC: 89 MG/DL — SIGNIFICANT CHANGE UP (ref 70–99)
HCT VFR BLD CALC: 33.5 % — LOW (ref 34.5–45)
HGB BLD-MCNC: 10.2 G/DL — LOW (ref 11.5–15.5)
MCHC RBC-ENTMCNC: 29.9 PG — SIGNIFICANT CHANGE UP (ref 27–34)
MCHC RBC-ENTMCNC: 30.4 GM/DL — LOW (ref 32–36)
MCV RBC AUTO: 98.2 FL — SIGNIFICANT CHANGE UP (ref 80–100)
PLATELET # BLD AUTO: 321 K/UL — SIGNIFICANT CHANGE UP (ref 150–400)
POTASSIUM SERPL-MCNC: 3.9 MMOL/L — SIGNIFICANT CHANGE UP (ref 3.5–5.3)
POTASSIUM SERPL-SCNC: 3.9 MMOL/L — SIGNIFICANT CHANGE UP (ref 3.5–5.3)
RBC # BLD: 3.41 M/UL — LOW (ref 3.8–5.2)
RBC # FLD: 14.5 % — SIGNIFICANT CHANGE UP (ref 10.3–14.5)
SARS-COV-2 IGG+IGM SERPL QL IA: 0.11 INDEX — SIGNIFICANT CHANGE UP
SARS-COV-2 IGG+IGM SERPL QL IA: >250 U/ML — HIGH
SARS-COV-2 IGG+IGM SERPL QL IA: NEGATIVE — SIGNIFICANT CHANGE UP
SARS-COV-2 IGG+IGM SERPL QL IA: POSITIVE
SODIUM SERPL-SCNC: 141 MMOL/L — SIGNIFICANT CHANGE UP (ref 135–145)
WBC # BLD: 5.22 K/UL — SIGNIFICANT CHANGE UP (ref 3.8–10.5)
WBC # FLD AUTO: 5.22 K/UL — SIGNIFICANT CHANGE UP (ref 3.8–10.5)

## 2022-01-12 PROCEDURE — 99232 SBSQ HOSP IP/OBS MODERATE 35: CPT

## 2022-01-12 RX ORDER — SODIUM CHLORIDE 9 MG/ML
500 INJECTION INTRAMUSCULAR; INTRAVENOUS; SUBCUTANEOUS ONCE
Refills: 0 | Status: COMPLETED | OUTPATIENT
Start: 2022-01-12 | End: 2022-01-12

## 2022-01-12 RX ADMIN — Medication 25 MICROGRAM(S): at 05:54

## 2022-01-12 RX ADMIN — SODIUM CHLORIDE 1000 MILLILITER(S): 9 INJECTION INTRAMUSCULAR; INTRAVENOUS; SUBCUTANEOUS at 12:28

## 2022-01-12 RX ADMIN — Medication 30 MILLILITER(S): at 12:28

## 2022-01-12 RX ADMIN — Medication 500 MILLIGRAM(S): at 09:50

## 2022-01-12 RX ADMIN — Medication 1 TABLET(S): at 09:50

## 2022-01-12 RX ADMIN — Medication 12.5 MILLIGRAM(S): at 09:50

## 2022-01-12 RX ADMIN — SODIUM CHLORIDE 500 MILLILITER(S): 9 INJECTION INTRAMUSCULAR; INTRAVENOUS; SUBCUTANEOUS at 15:00

## 2022-01-12 NOTE — PROGRESS NOTE ADULT - ASSESSMENT
89 y/o female admitted on 1/8/21  with PMHx of HLD, hypothyroidism, macular degeneration, recent diagnosis of bladder mass - suspicion for bladder CA (due to have surgery on Monday, 1/10), in the interim continued to be anemic as she has been having hematuria since her diagnosis. Patient admitted to medicine service for further management of acute on chronic anemia likely due to probable bladder neoplasms.    Hematuria with 2 polypoid bladder masses  suspicious for bladder cancer s/p surgical removal on 1/10 s/p Brandt   - CT abd 12/11/21 - noted - bladder masses, adenomyomatosis of fundus of GB, T11  compression fx chronic  - Patient is due to have surgery on Monday 1/10, pre-op testing on Wednesday 1/5 revealing Hgb of 7, received blood transfusion and discharged home. Of note, patient has been experiencing persistent hematuria since her diagnosis.  - Urologist: Dr. Fonseca-  Surgery  planned for Monday  - cardiology clearance noted Dr. Burris - pt moderate risk for intermedium risk procedure   - Monitor H&H , Hb > 8 , s/p 1 unit PRBC on 1/5/21 , s/p 1 unit PRBC  on 1/9/21   - ECG - noted, CXR - abnormal opacity on left 4 th rib   - 1/10- s/p Transurethral surgical removal or destruction of large neoplasm or multiple neoplasms of bladder for more than 30 minutes Instillation of mitomycin C into bladder  - pathology pending   - Brandt care, voiding trial today   - d/c in am ellen      Dizziness , multifactorial - Atrial fibrillation, Anemia, functional deconditioning  - anemia, a fib , deconditioning   - check orthostatics - neg , s/p 1 unit PRBC    - c/w low dose of BB   - pt not on AC due to hematuria     Acute on chronic acute blood loss anemia , Iron deficiency   - Multifactorial ~ due to hematuria from probable bladder neoplasms and iron deficiency   - S/p 1U of PRBCs on 1/5, patient sent to hospital for anemia, received a blood transfusion and discharged home.  - Monitor H&H, stable. check B12 wnl   - IV iron, with Vitamin C    Abnormal CXR 1.8 x 0.7 cm overlies opacity overlies LEFT anterior fourth rib of indeterminate etiology.  - asymptomatic   - may need CT chest - can be done as o/p     Hypothyroidism, under controlled with TSH 8   - Patient started on synthroid 12/11   - C/w synthroid 25mcg, repeat TFTs in 3-4 weeks  as o/p   - check free T4    HLD with     - Patient previously on lovastatin, has not renewed prescription in several months , PCP stop statins as per family    Generalized weakness, Chronic T11 compression fracture  - PT evaluation, check vitamin D, B12 level     Probable adenomyomatosis at the gallbladder fundus on CT 12/11/21 - o/p work-up and monitoring     Hypoalbuminemia. Malnutrition - Nutrition eval ,  Added ensure w/ meals, MVI daily       GOC / Advanced Care Planning:  -  Spoke w/ Son Fei (HCP) [phone]  updated 1/9/22 , 1/10 , 1/11, 1/12  -[facility] [phone] updated 1/12   -  FULL CODE     DVT ppx - SCDs in setting of anemia        Dispo:  d/c planning home with home care and PT vs [facility] if family can afford          89 y/o female admitted on 1/8/21  with PMHx of HLD, hypothyroidism, macular degeneration, recent diagnosis of bladder mass - suspicion for bladder CA (due to have surgery on Monday, 1/10), in the interim continued to be anemic as she has been having hematuria since her diagnosis. Patient admitted to medicine service for further management of acute on chronic anemia likely due to probable bladder neoplasms.    Hematuria with 2 polypoid bladder masses  suspicious for bladder cancer s/p surgical removal on 1/10 s/p Brandt   - CT abd 12/11/21 - noted - bladder masses, adenomyomatosis of fundus of GB, T11  compression fx chronic  - Patient is due to have surgery on Monday 1/10, pre-op testing on Wednesday 1/5 revealing Hgb of 7, received blood transfusion and discharged home. Of note, patient has been experiencing persistent hematuria since her diagnosis.  - Urologist: Dr. Fonseca-  Surgery  planned for Monday  - cardiology clearance noted Dr. Burris - pt moderate risk for intermedium risk procedure   - Monitor H&H , Hb > 8 , s/p 1 unit PRBC on 1/5/21 , s/p 1 unit PRBC  on 1/9/21   - ECG - noted, CXR - abnormal opacity on left 4 th rib   - 1/10- s/p Transurethral surgical removal or destruction of large neoplasm or multiple neoplasms of bladder for more than 30 minutes Instillation of mitomycin C into bladder  - pathology pending   - Brandt care, voiding trial today   - d/c in am ellen      Dizziness , multifactorial - Atrial fibrillation, Anemia, functional deconditioning  - anemia, a fib , deconditioning   - check orthostatics - neg , s/p 1 unit PRBC    - c/w low dose of BB   - pt not on AC due to hematuria     Acute on chronic acute blood loss anemia , Iron deficiency   - Multifactorial ~ due to hematuria from probable bladder neoplasms and iron deficiency   - S/p 1U of PRBCs on 1/5, patient sent to hospital for anemia, received a blood transfusion and discharged home.  - Monitor H&H, stable. check B12 wnl   - IV iron, with Vitamin C    Abnormal CXR 1.8 x 0.7 cm overlies opacity overlies LEFT anterior fourth rib of indeterminate etiology.  - asymptomatic   - may need CT chest - can be done as o/p     Hypothyroidism, under controlled with TSH 8   - Patient started on synthroid 12/11   - C/w synthroid 25mcg, repeat TFTs in 3-4 weeks  as o/p   - check free T4    HLD with     - Patient previously on lovastatin, has not renewed prescription in several months , PCP stop statins as per family    Generalized weakness, Chronic T11 compression fracture  - PT evaluation, check vitamin D, B12 level     Probable adenomyomatosis at the gallbladder fundus on CT 12/11/21 - o/p work-up and monitoring     Hypoalbuminemia. Malnutrition - Nutrition eval ,  Added ensure w/ meals, MVI daily     Paroxysmal A fib - hold AC for now, patient will follow up with cardiologist to restart AC once cleared by urology team      GOC / Advanced Care Planning:  -  Spoke w/ Son Fei (HCP) [phone]  updated 1/9/22 , 1/10 , 1/11, 1/12  -[facility] [phone] updated 1/12   -  FULL CODE     DVT ppx - SCDs in setting of anemia        Dispo:  d/c planning home with home care and PT vs [facility] if family can afford

## 2022-01-12 NOTE — PROGRESS NOTE ADULT - SUBJECTIVE AND OBJECTIVE BOX
Subjective:  Patient is a 89y old  Female who presents with a chief complaint of anemia, bladder nodules      HPI:       89 y/o female with PMHx of HLD, hypothyroidism, macular degeneration, recent diagnosis of bladder mass - suspicion for bladder CA (due to have surgery on Monday, 1/10), in the interim continued to be anemic as she has been having hematuria since her diagnosis. Pt went for pre-op testing on Wednesday and was found to be anemic, and was brought to the hospital, where she received 1 unit of PRBCs. Patient's daughter states she received phone call yesterday saying that patient's hemoglobin was 7 and to bring to the hospital for reevaluation.  Of note, patient with recent admission from  to , during that admission patient found to have on CT scan 2 polypoid soft tissue nodules in bladder - a nodule along the left posterior wall measures 3.7 x 2.7 x 2.0 cm and a nodule at the base of the bladder measures 1.2 x 1.4 x 1.8 cm.  Seen and examined in ED. Used Portuguese  services, patient denies symptoms at this time. Weakness and depression reported per son.  Son Fei  [phone] , [phone] or Errol  [phone]     22 -  Patient seen and examined at bedside earlier today, Portuguese  used # [id number] , patient reports feeling weak, unable to get up from the bed, reports hematuria , denies any pain, cp, dyspnea, afebrile, plan of care discussed , all questions is answered   1/10 /22- pt seen and examined, Portuguese  # [id number] used denies pain, hematuria persist as per RN reports, pt poor historian, denies cp, dyspnea, abdominal pain   - pt seen and examined in am, OOB in the chair, feels better, denies any pain, tolerating po diet, plan discussed     Review of system- Rest of the review of system are negative except mentioned in HPI    Objective:   Vitals reviewed for last 24 hours  T(C): 36.9 (22 @ 15:22), Max: 37.3 (01-10-22 @ 21:33)  T(F): 98.5 (22 @ 15:22), Max: 99.1 (01-10-22 @ 21:33)  HR: 80 (22 @ 15:22) (80 - 102)  BP: 103/58 (22 @ 15:22) (97/54 - 121/64)  RR: 18 (22 @ 15:22) (14 - 20)  SpO2: 99% (22 @ 15:22) (95% - 99%)  Wt(kg): --    Physical exam:   General : NAD, appear to be of stated age , well groomed   NERVOUS SYSTEM:  Alert & Oriented X2, non- focal exam, Motor Strength 5/5 B/L upper and lower extremities; DTRs 2+ intact and symmetric  HEAD:  Atraumatic, Normocephalic  EYES: EOMI, PERRLA, conjunctiva and sclera clear  HEENT: Moist mucous membranes, Supple neck , No JVD  CHEST: Clear to auscultation bilaterally; No rales, no rhonchi, no wheezing  HEART: Regular rate and rhythm; No murmurs, no rubs or gallops  ABDOMEN: Soft, Non-tender, Non-distended; Bowel sounds present, no guarding , no peritoneal irritation   GENITOURINARY- Voiding, no suprapubic tenderness  EXTREMITIES:  2+ Peripheral Pulses, No clubbing, cyanosis,   edema  MUSCULOSKELETAL:- No muscle tenderness, Muscle tone normal, No joint tenderness, no Joint swelling,  Joint ROM –normal   SKIN-no rash, no lesion    LABS: all reviewed      143  |  110<H>  |  16  ----------------------------<  100<H>  3.7   |  29  |  0.66    Ca    8.8      2022 07:29  Phos  3.2     01-10  Mg     2.4     01-10    TPro  6.5  /  Alb  2.9<L>  /  TBili  0.6  /  DBili  x   /  AST  21  /  ALT  14  /  AlkPhos  65  01-10                            10.9   6.56  )-----------( 334      ( 2022 07:29 )             34.5             LIVER FUNCTIONS - ( 10 Alexandre 2022 06:11 )  Alb: 2.9 g/dL / Pro: 6.5 gm/dL / ALK PHOS: 65 U/L / ALT: 14 U/L / AST: 21 U/L / GGT: x                   Urinalysis Basic - ( 2022 17:25 )    Color: Katie / Appearance: very cloudy / S.020 / pH: x  Gluc: x / Ketone: Trace  / Bili: Negative / Urobili: Negative mg/dL   Blood: x / Protein: 100 mg/dL / Nitrite: Negative   Leuk Esterase: Negative / RBC: >50 /HPF / WBC 3-5   Sq Epi: x / Non Sq Epi: Occasional / Bacteria: Moderate            Urinalysis Basic - ( 2022 17:25 )    Color: Katie / Appearance: very cloudy / S.020 / pH: x  Gluc: x / Ketone: Trace  / Bili: Negative / Urobili: Negative mg/dL   Blood: x / Protein: 100 mg/dL / Nitrite: Negative   Leuk Esterase: Negative / RBC: >50 /HPF / WBC 3-5   Sq Epi: x / Non Sq Epi: Occasional / Bacteria: Moderate      RECENT CULTURES:    RADIOLOGY & ADDITIONAL TESTS: all reviewed   EKG  reviewed  - sinus with 1 st degree av block   < from: Xray Chest 1 View- PORTABLE-Urgent (Xray Chest 1 View- PORTABLE-Urgent .) (22 @ 12:37) >  FINDINGS:   1.8 x 0.7 cm overlies opacity overlies LEFT anterior fourth rib of   indeterminate etiology.  The remaining visualized lungs are clear of airspace consolidations or   effusions.  No pneumothorax.    The heart and mediastinum size and configuration are within normal limits.    Visualized osseous structures are intact.    IMPRESSION: . LEFT mid zone opacity of indeterminate etiology.  Follow-up radiographs or CT recommended.    < end of copied text >  < from: CT Abdomen and Pelvis w/ IV Cont (21 @ 12:59) >  FINDINGS:  LOWER CHEST: Benign calcified granuloma in the lingula.    LIVER: Within normal limits.  BILE DUCTS: Normal caliber.  GALLBLADDER: Probable adenomyomatosis at the gallbladder fundus.  SPLEEN: Within normal limits.  PANCREAS: Within normal limits.  ADRENALS: Within normal limits.  KIDNEYS/URETERS: A few subcentimeter hypodense foci in the left kidney   which are too small to characterize.    BLADDER: 2 polypoid soft tissue nodules;  *A nodule along the left posterior wall measures 3.7 x 2.7 x 2.0 cm  *A nodule at the base of the bladder measures 1.2 x 1.4 x 1.8 cm.  Bilateral bladder diverticula noted.  REPRODUCTIVE ORGANS: Uterus and adnexa within normal limits.    BOWEL: No bowel obstruction. Appendix not identified. Colonic   diverticulosis without diverticulitis.  PERITONEUM: No ascites.  VESSELS: Atherosclerotic changes.  RETROPERITONEUM/LYMPH NODES: No lymphadenopathy.  ABDOMINAL WALL: Within normal limits.  BONES: No acute abnormality. Severe T11 compression fracture, most likely   chronic.    IMPRESSION:  2 polypoid bladder masses suspicious for multifocal bladder neoplasm.      Current medications:  acetaminophen     Tablet .. 650 milliGRAM(s) Oral every 6 hours PRN  ascorbic acid 500 milliGRAM(s) Oral daily  iron sucrose Injectable 200 milliGRAM(s) IV Push every 24 hours  levoFLOXacin  Tablet 500 milliGRAM(s) Oral every 24 hours  levothyroxine 25 MICROGram(s) Oral daily  melatonin 3 milliGRAM(s) Oral at bedtime PRN  metoprolol tartrate 12.5 milliGRAM(s) Oral two times a day  multivitamin 1 Tablet(s) Oral daily  ondansetron Injectable 4 milliGRAM(s) IV Push every 8 hours PRN  sodium chloride 0.9%. 1000 milliLiter(s) IV Continuous <Continuous>            
Subjective:  Patient is a 89y old  Female who presents with a chief complaint of anemia, bladder nodules      HPI:       89 y/o female with PMHx of HLD, hypothyroidism, macular degeneration, recent diagnosis of bladder mass - suspicion for bladder CA (due to have surgery on Monday, 1/10), in the interim continued to be anemic as she has been having hematuria since her diagnosis. Pt went for pre-op testing on Wednesday and was found to be anemic, and was brought to the hospital, where she received 1 unit of PRBCs. Patient's daughter states she received phone call yesterday saying that patient's hemoglobin was 7 and to bring to the hospital for reevaluation.  Of note, patient with recent admission from  to , during that admission patient found to have on CT scan 2 polypoid soft tissue nodules in bladder - a nodule along the left posterior wall measures 3.7 x 2.7 x 2.0 cm and a nodule at the base of the bladder measures 1.2 x 1.4 x 1.8 cm.  Seen and examined in ED. Used Cypriot  services, patient denies symptoms at this time. Weakness and depression reported per son.  Son Fei  [phone] , [phone] or Errol  [phone]     22 -  Patient seen and examined at bedside earlier today, Cypriot  used # [id number] , patient reports feeling weak, unable to get up from the bed, reports hematuria , denies any pain, cp, dyspnea, afebrile, plan of care discussed , all questions is answered     Review of system- Rest of the review of system are negative except mentioned in HPI    Objective:   Vital sings reviewed for last 24 h  T(C): 37.1 (22 @ 13:22), Max: 37.3 (22 @ 09:00)  HR: 94 (22 @ 13:08) (71 - 94)  BP: 133/70 (22 @ 13:08) (106/46 - 133/70)  RR: 18 (22 @ 13:22) (18 - 18)  SpO2: 98% (22 @ 13:22) (97% - 100%)      Physical exam:   General : NAD, appear to be of stated age , well groomed   NERVOUS SYSTEM:  Alert & Oriented X2, non- focal exam, Motor Strength 5/5 B/L upper and lower extremities; DTRs 2+ intact and symmetric  HEAD:  Atraumatic, Normocephalic  EYES: EOMI, PERRLA, conjunctiva and sclera clear  HEENT: Moist mucous membranes, Supple neck , No JVD  CHEST: Clear to auscultation bilaterally; No rales, no rhonchi, no wheezing  HEART: Regular rate and rhythm; No murmurs, no rubs or gallops  ABDOMEN: Soft, Non-tender, Non-distended; Bowel sounds present, no guarding , no peritoneal irritation   GENITOURINARY- Voiding, no suprapubic tenderness  EXTREMITIES:  2+ Peripheral Pulses, No clubbing, cyanosis,   edema  MUSCULOSKELETAL:- No muscle tenderness, Muscle tone normal, No joint tenderness, no Joint swelling,  Joint ROM –normal   SKIN-no rash, no lesion    LABS: all reviewed                        8.1    4.75  )-----------( 323      ( 2022 07:19 )             26.7         141  |  108  |  13  ----------------------------<  93  4.3   |  30  |  0.66    Ca    9.0      2022 07:19    TPro  5.5<L>  /  Alb  2.4<L>  /  TBili  0.4  /  DBili  x   /  AST  19  /  ALT  11<L>  /  AlkPhos  57  -09    PT/INR - ( 2022 10:05 )   PT: 12.7 sec;   INR: 1.10 ratio         PTT - ( 2022 10:05 )  PTT:33.0 sec        Urinalysis Basic - ( 2022 17:25 )    Color: Katie / Appearance: very cloudy / S.020 / pH: x  Gluc: x / Ketone: Trace  / Bili: Negative / Urobili: Negative mg/dL   Blood: x / Protein: 100 mg/dL / Nitrite: Negative   Leuk Esterase: Negative / RBC: >50 /HPF / WBC 3-5   Sq Epi: x / Non Sq Epi: Occasional / Bacteria: Moderate      RECENT CULTURES:    RADIOLOGY & ADDITIONAL TESTS: all reviewed   EKG  reviewed  - sinus with 1 st degree av block   < from: Xray Chest 1 View- PORTABLE-Urgent (Xray Chest 1 View- PORTABLE-Urgent .) (22 @ 12:37) >  FINDINGS:   1.8 x 0.7 cm overlies opacity overlies LEFT anterior fourth rib of   indeterminate etiology.  The remaining visualized lungs are clear of airspace consolidations or   effusions.  No pneumothorax.    The heart and mediastinum size and configuration are within normal limits.    Visualized osseous structures are intact.    IMPRESSION: . LEFT mid zone opacity of indeterminate etiology.  Follow-up radiographs or CT recommended.    < end of copied text >  < from: CT Abdomen and Pelvis w/ IV Cont (21 @ 12:59) >  FINDINGS:  LOWER CHEST: Benign calcified granuloma in the lingula.    LIVER: Within normal limits.  BILE DUCTS: Normal caliber.  GALLBLADDER: Probable adenomyomatosis at the gallbladder fundus.  SPLEEN: Within normal limits.  PANCREAS: Within normal limits.  ADRENALS: Within normal limits.  KIDNEYS/URETERS: A few subcentimeter hypodense foci in the left kidney   which are too small to characterize.    BLADDER: 2 polypoid soft tissue nodules;  *A nodule along the left posterior wall measures 3.7 x 2.7 x 2.0 cm  *A nodule at the base of the bladder measures 1.2 x 1.4 x 1.8 cm.  Bilateral bladder diverticula noted.  REPRODUCTIVE ORGANS: Uterus and adnexa within normal limits.    BOWEL: No bowel obstruction. Appendix not identified. Colonic   diverticulosis without diverticulitis.  PERITONEUM: No ascites.  VESSELS: Atherosclerotic changes.  RETROPERITONEUM/LYMPH NODES: No lymphadenopathy.  ABDOMINAL WALL: Within normal limits.  BONES: No acute abnormality. Severe T11 compression fracture, most likely   chronic.    IMPRESSION:  2 polypoid bladder masses suspicious for multifocal bladder neoplasm.      Current medications:  acetaminophen     Tablet .. 650 milliGRAM(s) Oral every 6 hours PRN  ascorbic acid 500 milliGRAM(s) Oral daily  iron sucrose Injectable 200 milliGRAM(s) IV Push every 24 hours  levoFLOXacin  Tablet 500 milliGRAM(s) Oral every 24 hours  levothyroxine 25 MICROGram(s) Oral daily  melatonin 3 milliGRAM(s) Oral at bedtime PRN  metoprolol tartrate 12.5 milliGRAM(s) Oral two times a day  multivitamin 1 Tablet(s) Oral daily  ondansetron Injectable 4 milliGRAM(s) IV Push every 8 hours PRN  sodium chloride 0.9%. 1000 milliLiter(s) IV Continuous <Continuous>            
 POD # 1    Pt seen and examined without complaints. Pain is controlled. Pt has no issues overnight with li.  Denies SOB/CP/N/V.     Vital Signs Last 24 Hrs  T(C): 36.9 (11 Jan 2022 09:05), Max: 37.3 (10 Alexandre 2022 21:33)  T(F): 98.5 (11 Jan 2022 09:05), Max: 99.1 (10 Alexandre 2022 21:33)  HR: 92 (11 Jan 2022 09:05) (74 - 102)  BP: 102/56 (11 Jan 2022 09:05) (97/54 - 127/70)  BP(mean): --  RR: 18 (11 Jan 2022 09:05) (12 - 20)  SpO2: 97% (11 Jan 2022 09:05) (94% - 99%)    I&O's Summary    10 Alexandre 2022 07:01  -  11 Jan 2022 07:00  --------------------------------------------------------  IN: 600 mL / OUT: 600 mL / NET: 0 mL    11 Jan 2022 07:01  -  11 Jan 2022 12:44  --------------------------------------------------------  IN: 0 mL / OUT: 425 mL / NET: -425 mL        Physical Exam  Gen: NAD, A&Ox3  Abd: Soft, NT, ND, No bladder palp  : Li in place, urine orange rust color secondary to pyridium         UO-6oocc                        10.9   6.56  )-----------( 334      ( 11 Jan 2022 07:29 )             34.5       01-11    143  |  110<H>  |  16  ----------------------------<  100<H>  3.7   |  29  |  0.66    Ca    8.8      11 Jan 2022 07:29  Phos  3.2     01-10  Mg     2.4     01-10    TPro  6.5  /  Alb  2.9<L>  /  TBili  0.6  /  DBili  x   /  AST  21  /  ALT  14  /  AlkPhos  65  01-10      
Subjective:  Patient is a 89y old  Female who presents with a chief complaint of anemia, bladder nodules      HPI:       89 y/o female with PMHx of HLD, hypothyroidism, macular degeneration, recent diagnosis of bladder mass - suspicion for bladder CA (due to have surgery on Monday, 1/10), in the interim continued to be anemic as she has been having hematuria since her diagnosis. Pt went for pre-op testing on Wednesday and was found to be anemic, and was brought to the hospital, where she received 1 unit of PRBCs. Patient's daughter states she received phone call yesterday saying that patient's hemoglobin was 7 and to bring to the hospital for reevaluation.  Of note, patient with recent admission from  to , during that admission patient found to have on CT scan 2 polypoid soft tissue nodules in bladder - a nodule along the left posterior wall measures 3.7 x 2.7 x 2.0 cm and a nodule at the base of the bladder measures 1.2 x 1.4 x 1.8 cm.  Seen and examined in ED. Used Lithuanian  services, patient denies symptoms at this time. Weakness and depression reported per son.  Son Fei  [phone] , [phone] or Errol  [phone]     22 -  Patient seen and examined at bedside earlier today, Lithuanian  used # [id number] , patient reports feeling weak, unable to get up from the bed, reports hematuria , denies any pain, cp, dyspnea, afebrile, plan of care discussed , all questions is answered   1/10 /22- pt seen and examined, Lithuanian  # [id number] used denies pain, hematuria persist as per RN reports, pt poor historian, denies cp, dyspnea, abdominal pain     Review of system- Rest of the review of system are negative except mentioned in HPI    Objective:   Vitals reviewed for last 24 hours  T(C): 37.2 (01-10-22 @ 11:05), Max: 37.2 (01-10-22 @ 11:05)  T(F): 98.9 (01-10-22 @ 11:05), Max: 98.9 (01-10-22 @ 11:05)  HR: 110 (01-10-22 @ 11:05) (80 - 110)  BP: 119/67 (01-10-22 @ 11:05) (111/75 - 133/70)  RR: 18 (01-10-22 @ 11:05) (18 - 18)  SpO2: 96% (01-10-22 @ 11:05) (96% - 100%)  Wt(kg): --      Physical exam:   General : NAD, appear to be of stated age , well groomed   NERVOUS SYSTEM:  Alert & Oriented X2, non- focal exam, Motor Strength 5/5 B/L upper and lower extremities; DTRs 2+ intact and symmetric  HEAD:  Atraumatic, Normocephalic  EYES: EOMI, PERRLA, conjunctiva and sclera clear  HEENT: Moist mucous membranes, Supple neck , No JVD  CHEST: Clear to auscultation bilaterally; No rales, no rhonchi, no wheezing  HEART: Regular rate and rhythm; No murmurs, no rubs or gallops  ABDOMEN: Soft, Non-tender, Non-distended; Bowel sounds present, no guarding , no peritoneal irritation   GENITOURINARY- Voiding, no suprapubic tenderness  EXTREMITIES:  2+ Peripheral Pulses, No clubbing, cyanosis,   edema  MUSCULOSKELETAL:- No muscle tenderness, Muscle tone normal, No joint tenderness, no Joint swelling,  Joint ROM –normal   SKIN-no rash, no lesion    LABS: all reviewed  01-10    139  |  107  |  13  ----------------------------<  105<H>  4.0   |  26  |  0.66    Ca    9.3      10 Alexandre 2022 06:11  Phos  3.2     01-10  Mg     2.4     01-10    TPro  6.5  /  Alb  2.9<L>  /  TBili  0.6  /  DBili  x   /  AST  21  /  ALT  14  /  AlkPhos  65  01-10                            10.6   6.53  )-----------( 396      ( 10 Alexandre 2022 06:11 )             34.4             LIVER FUNCTIONS - ( 10 Alexandre 2022 06:11 )  Alb: 2.9 g/dL / Pro: 6.5 gm/dL / ALK PHOS: 65 U/L / ALT: 14 U/L / AST: 21 U/L / GGT: x                 Urinalysis Basic - ( 2022 17:25 )    Color: Katie / Appearance: very cloudy / S.020 / pH: x  Gluc: x / Ketone: Trace  / Bili: Negative / Urobili: Negative mg/dL   Blood: x / Protein: 100 mg/dL / Nitrite: Negative   Leuk Esterase: Negative / RBC: >50 /HPF / WBC 3-5   Sq Epi: x / Non Sq Epi: Occasional / Bacteria: Moderate                            8.1    4.75  )-----------( 323      ( 2022 07:19 )             26.7         141  |  108  |  13  ----------------------------<  93  4.3   |  30  |  0.66    Ca    9.0      2022 07:19    TPro  5.5<L>  /  Alb  2.4<L>  /  TBili  0.4  /  DBili  x   /  AST  19  /  ALT  11<L>  /  AlkPhos  57  09    PT/INR - ( 2022 10:05 )   PT: 12.7 sec;   INR: 1.10 ratio         PTT - ( 2022 10:05 )  PTT:33.0 sec        Urinalysis Basic - ( 2022 17:25 )    Color: Katie / Appearance: very cloudy / S.020 / pH: x  Gluc: x / Ketone: Trace  / Bili: Negative / Urobili: Negative mg/dL   Blood: x / Protein: 100 mg/dL / Nitrite: Negative   Leuk Esterase: Negative / RBC: >50 /HPF / WBC 3-5   Sq Epi: x / Non Sq Epi: Occasional / Bacteria: Moderate      RECENT CULTURES:    RADIOLOGY & ADDITIONAL TESTS: all reviewed   EKG  reviewed  - sinus with 1 st degree av block   < from: Xray Chest 1 View- PORTABLE-Urgent (Xray Chest 1 View- PORTABLE-Urgent .) (22 @ 12:37) >  FINDINGS:   1.8 x 0.7 cm overlies opacity overlies LEFT anterior fourth rib of   indeterminate etiology.  The remaining visualized lungs are clear of airspace consolidations or   effusions.  No pneumothorax.    The heart and mediastinum size and configuration are within normal limits.    Visualized osseous structures are intact.    IMPRESSION: . LEFT mid zone opacity of indeterminate etiology.  Follow-up radiographs or CT recommended.    < end of copied text >  < from: CT Abdomen and Pelvis w/ IV Cont (21 @ 12:59) >  FINDINGS:  LOWER CHEST: Benign calcified granuloma in the lingula.    LIVER: Within normal limits.  BILE DUCTS: Normal caliber.  GALLBLADDER: Probable adenomyomatosis at the gallbladder fundus.  SPLEEN: Within normal limits.  PANCREAS: Within normal limits.  ADRENALS: Within normal limits.  KIDNEYS/URETERS: A few subcentimeter hypodense foci in the left kidney   which are too small to characterize.    BLADDER: 2 polypoid soft tissue nodules;  *A nodule along the left posterior wall measures 3.7 x 2.7 x 2.0 cm  *A nodule at the base of the bladder measures 1.2 x 1.4 x 1.8 cm.  Bilateral bladder diverticula noted.  REPRODUCTIVE ORGANS: Uterus and adnexa within normal limits.    BOWEL: No bowel obstruction. Appendix not identified. Colonic   diverticulosis without diverticulitis.  PERITONEUM: No ascites.  VESSELS: Atherosclerotic changes.  RETROPERITONEUM/LYMPH NODES: No lymphadenopathy.  ABDOMINAL WALL: Within normal limits.  BONES: No acute abnormality. Severe T11 compression fracture, most likely   chronic.    IMPRESSION:  2 polypoid bladder masses suspicious for multifocal bladder neoplasm.      Current medications:  acetaminophen     Tablet .. 650 milliGRAM(s) Oral every 6 hours PRN  ascorbic acid 500 milliGRAM(s) Oral daily  iron sucrose Injectable 200 milliGRAM(s) IV Push every 24 hours  levoFLOXacin  Tablet 500 milliGRAM(s) Oral every 24 hours  levothyroxine 25 MICROGram(s) Oral daily  melatonin 3 milliGRAM(s) Oral at bedtime PRN  metoprolol tartrate 12.5 milliGRAM(s) Oral two times a day  multivitamin 1 Tablet(s) Oral daily  ondansetron Injectable 4 milliGRAM(s) IV Push every 8 hours PRN  sodium chloride 0.9%. 1000 milliLiter(s) IV Continuous <Continuous>            
Cardiology Progress Note    HPI: 89 y/o female with PMHx of HLD, hypothyroidism, macular degeneration, recent diagnosis of bladder mass - suspicion for bladder CA (due to have surgery on Monday, 1/10), in the interim continued to be anemic as she has been having hematuria since her diagnosis. Pt went for pre-op testing on Wednesday and was found to be anemic, and was brought to the hospital, where she received 1 unit of PRBCs. Patient's daughter states she received phone call yesterday saying that patient's hemoglobin was 7 and to bring to the hospital for reevaluation.  ~ Of note, patient with recent admission from  to , during that admission patient found to have on CT scan 2 polypoid soft tissue nodules in bladder - a nodule along the left posterior wall measures 3.7 x 2.7 x 2.0 cm and a nodule at the base of the bladder measures 1.2 x 1.4 x 1.8 cm.  ~ Seen and examined in ED. Used Hungarian  services, patient denies symptoms at this time. Weakness and depression reported per son. Son Fei [phone] or Errol  [phone] requesting to use them for interpretation, refusing  services.  (2022 12:41)    . Pt seen/examined on . Called for cardiology clearance prior to bladder mass removal surgery.   Pt was cleared by Dr. Cannon as outpt 1/3.   No CP/SOB. Not on tele.   S/p pRBC for anemia.    1/10. Awaiting TURBT today for bladder mass.  No CP/SOB. No events last pm.    PAST MEDICAL & SURGICAL HISTORY:  HLD (hyperlipidemia)  Malignant neoplasm of bladder  Macular degeneration  Hypothyroid    Allergies  No Known Allergies    SOCIAL HISTORY: Denies tobacco, etoh abuse or illicit drug use    FAMILY HISTORY:  Family history unobtainable due to patient&#x27;s condition    MEDICATIONS  (STANDING):  ascorbic acid 500 milliGRAM(s) Oral daily  enoxaparin Injectable 40 milliGRAM(s) SubCutaneous daily  ferrous    sulfate 325 milliGRAM(s) Oral two times a day  levoFLOXacin  Tablet 500 milliGRAM(s) Oral every 24 hours  levothyroxine 25 MICROGram(s) Oral daily  metoprolol tartrate 12.5 milliGRAM(s) Oral two times a day  multivitamin 1 Tablet(s) Oral daily    MEDICATIONS  (PRN):  acetaminophen     Tablet .. 650 milliGRAM(s) Oral every 6 hours PRN Temp greater or equal to 38C (100.4F), Mild Pain (1 - 3)  melatonin 3 milliGRAM(s) Oral at bedtime PRN Insomnia  ondansetron Injectable 4 milliGRAM(s) IV Push every 8 hours PRN Nausea and/or Vomiting    Vital Signs Last 24 Hrs  T(C): 36.8 (2022 21:19), Max: 37.1 (2022 14:00)  T(F): 98.3 (2022 21:19), Max: 98.7 (2022 14:00)  HR: 71 (2022 21:19) (64 - 100)  BP: 106/46 (2022 21:19) (106/46 - 121/58)  BP(mean): 76 (2022 11:33) (71 - 76)  RR: 18 (2022 21:19) (15 - 18)  SpO2: 100% (2022 21:19) (100% - 100%)    REVIEW OF SYSTEMS:    CONSTITUTIONAL:  As per HPI. Anemic, weak.  HEENT:  Eyes:  No diplopia or blurred vision. ENT:  No earache, sore throat or runny nose.  CARDIOVASCULAR:  No pressure, squeezing, strangling, tightness, heaviness or aching about the chest, neck, axilla or epigastrium.  RESPIRATORY:  No cough, shortness of breath, PND or orthopnea.  GASTROINTESTINAL:  No nausea, vomiting or diarrhea.  GENITOURINARY:  No dysuria, frequency or urgency.  MUSCULOSKELETAL:  As per HPI.  SKIN:  No change in skin, hair or nails.  NEUROLOGIC:  No paresthesias, fasciculations, seizures or weakness.    PHYSICAL EXAMINATION:    GENERAL APPEARANCE:  Pt. is not currently dyspneic, in no distress. Pt. is alert, oriented, and pleasant.  HEENT:  Pupils are normal and react normally. No icterus. Mucous membranes well colored.  NECK:  Supple. No lymphadenopathy. Jugular venous pressure not elevated. Carotids equal.   HEART:   The cardiac impulse has a normal quality. There are no murmurs, rubs or gallops noted  CHEST:  Chest is clear to auscultation. Normal respiratory effort.  ABDOMEN:  Soft and nontender.   EXTREMITIES:  There is no edema.     LABS:                        8.1    4.75  )-----------( 323      ( 2022 07:19 )             26.7         142  |  109<H>  |  14  ----------------------------<  122<H>  3.8   |  30  |  0.73    Ca    9.0      2022 10:05    TPro  5.9<L>  /  Alb  2.6<L>  /  TBili  0.4  /  DBili  x   /  AST  19  /  ALT  13  /  AlkPhos  60      LIVER FUNCTIONS - ( 2022 10:05 )  Alb: 2.6 g/dL / Pro: 5.9 gm/dL / ALK PHOS: 60 U/L / ALT: 13 U/L / AST: 19 U/L / GGT: x           PT/INR - ( 2022 10:05 )   PT: 12.7 sec;   INR: 1.10 ratio       PTT - ( 2022 10:05 )  PTT:33.0 sec    Urinalysis Basic - ( 2022 17:25 )    Color: Katie / Appearance: very cloudy / S.020 / pH: x  Gluc: x / Ketone: Trace  / Bili: Negative / Urobili: Negative mg/dL   Blood: x / Protein: 100 mg/dL / Nitrite: Negative   Leuk Esterase: Negative / RBC: >50 /HPF / WBC 3-5   Sq Epi: x / Non Sq Epi: Occasional / Bacteria: Moderate    EKG: < from: 12 Lead ECG (22 @ 16:58) >  Sinus rhythm with 1st degree A-V block with Premature atrial complexes with Aberrant conduction  Nonspecific T wave abnormality  Abnormal ECG  When compared with ECG of 29-DEC-2021 14:14,  Sinus rhythm has replaced Atrial fibrillation    TELEMETRY: Not on tele.     CARDIAC TESTS: None    RADIOLOGY & ADDITIONAL STUDIES: < from: Xray Chest 1 View- PORTABLE-Urgent (Xray Chest 1 View- PORTABLE-Urgent .) (22 @ 12:37) >  IMPRESSION: . LEFT mid zone opacity of indeterminate etiology.  Follow-up radiographs or CT recommended.    < end of copied text >  < from: CT Abdomen and Pelvis w/ IV Cont (21 @ 12:59) >  IMPRESSION:  2 polypoid bladder masses suspicious for multifocal bladder neoplasm.    ASSESSMENT & PLAN:
Subjective:  Patient is a 89y old  Female who presents with a chief complaint of anemia, bladder nodules      HPI:       89 y/o female with PMHx of HLD, hypothyroidism, macular degeneration, recent diagnosis of bladder mass - suspicion for bladder CA (due to have surgery on Monday, 1/10), in the interim continued to be anemic as she has been having hematuria since her diagnosis. Pt went for pre-op testing on Wednesday and was found to be anemic, and was brought to the hospital, where she received 1 unit of PRBCs. Patient's daughter states she received phone call yesterday saying that patient's hemoglobin was 7 and to bring to the hospital for reevaluation.  Of note, patient with recent admission from  to , during that admission patient found to have on CT scan 2 polypoid soft tissue nodules in bladder - a nodule along the left posterior wall measures 3.7 x 2.7 x 2.0 cm and a nodule at the base of the bladder measures 1.2 x 1.4 x 1.8 cm.  Seen and examined in ED. Used Niuean  services, patient denies symptoms at this time. Weakness and depression reported per son.  Son Fei  [phone] , [phone] or Errol  [phone]     22 -  Patient seen and examined at bedside earlier today, Niuean  used # [id number] , patient reports feeling weak, unable to get up from the bed, reports hematuria , denies any pain, cp, dyspnea, afebrile, plan of care discussed , all questions is answered   1/10 /22- pt seen and examined, Niuean  # [id number] used denies pain, hematuria persist as per RN reports, pt poor historian, denies cp, dyspnea, abdominal pain   - pt seen and examined in am, OOB in the chair, feels better, denies any pain, tolerating po diet, plan discussed    - pt seen and examined, feels well, some lower abdominal discomfort, tolerating po intake, afebrile, plan for discharge discussed     Review of system- Rest of the review of system are negative except mentioned in HPI    Objective:   Vitals reviewed for last 24 hours  T(C): 36.7 (22 @ 08:45), Max: 36.9 (22 @ 15:22)  T(F): 98 (22 @ 08:45), Max: 98.5 (22 @ 15:22)  HR: 95 (22 @ 08:45) (80 - 95)  BP: 130/67 (22 @ 08:45) (103/58 - 133/78)  RR: 18 (22 @ 08:45) (16 - 18)  SpO2: 99% (22 @ 08:45) (97% - 99%)  Wt(kg): --      Physical exam:   General : NAD, appear to be of stated age , well groomed   NERVOUS SYSTEM:  Alert & Oriented X2, non- focal exam, Motor Strength 5/5 B/L upper and lower extremities; DTRs 2+ intact and symmetric  HEAD:  Atraumatic, Normocephalic  EYES: EOMI, PERRLA, conjunctiva and sclera clear  HEENT: Moist mucous membranes, Supple neck , No JVD  CHEST: Clear to auscultation bilaterally; No rales, no rhonchi, no wheezing  HEART: Regular rate and rhythm; No murmurs, no rubs or gallops  ABDOMEN: Soft, Non-tender, Non-distended; Bowel sounds present, no guarding , no peritoneal irritation   GENITOURINARY- Voiding, no suprapubic tenderness  EXTREMITIES:  2+ Peripheral Pulses, No clubbing, cyanosis,   edema  MUSCULOSKELETAL:- No muscle tenderness, Muscle tone normal, No joint tenderness, no Joint swelling,  Joint ROM –normal   SKIN-no rash, no lesion    LABS: all reviewed      141  |  109<H>  |  11  ----------------------------<  89  3.9   |  29  |  0.59    Ca    9.2      2022 07:42                              10.2   5.22  )-----------( 321      ( 2022 07:42 )             33.5             143  |  110<H>  |  16  ----------------------------<  100<H>  3.7   |  29  |  0.66    Ca    8.8      2022 07:29  Phos  3.2     01-10  Mg     2.4     -10    TPro  6.5  /  Alb  2.9<L>  /  TBili  0.6  /  DBili  x   /  AST  21  /  ALT  14  /  AlkPhos  65  01-10                            10.9   6.56  )-----------( 334      ( 2022 07:29 )             34.5             LIVER FUNCTIONS - ( 10 Alexandre 2022 06:11 )  Alb: 2.9 g/dL / Pro: 6.5 gm/dL / ALK PHOS: 65 U/L / ALT: 14 U/L / AST: 21 U/L / GGT: x                   Urinalysis Basic - ( 2022 17:25 )    Color: Katie / Appearance: very cloudy / S.020 / pH: x  Gluc: x / Ketone: Trace  / Bili: Negative / Urobili: Negative mg/dL   Blood: x / Protein: 100 mg/dL / Nitrite: Negative   Leuk Esterase: Negative / RBC: >50 /HPF / WBC 3-5   Sq Epi: x / Non Sq Epi: Occasional / Bacteria: Moderate            Urinalysis Basic - ( 2022 17:25 )    Color: Katie / Appearance: very cloudy / S.020 / pH: x  Gluc: x / Ketone: Trace  / Bili: Negative / Urobili: Negative mg/dL   Blood: x / Protein: 100 mg/dL / Nitrite: Negative   Leuk Esterase: Negative / RBC: >50 /HPF / WBC 3-5   Sq Epi: x / Non Sq Epi: Occasional / Bacteria: Moderate      RECENT CULTURES:    RADIOLOGY & ADDITIONAL TESTS: all reviewed   EKG  reviewed  - sinus with 1 st degree av block   < from: Xray Chest 1 View- PORTABLE-Urgent (Xray Chest 1 View- PORTABLE-Urgent .) (22 @ 12:37) >  FINDINGS:   1.8 x 0.7 cm overlies opacity overlies LEFT anterior fourth rib of   indeterminate etiology.  The remaining visualized lungs are clear of airspace consolidations or   effusions.  No pneumothorax.    The heart and mediastinum size and configuration are within normal limits.    Visualized osseous structures are intact.    IMPRESSION: . LEFT mid zone opacity of indeterminate etiology.  Follow-up radiographs or CT recommended.    < end of copied text >  < from: CT Abdomen and Pelvis w/ IV Cont (21 @ 12:59) >  FINDINGS:  LOWER CHEST: Benign calcified granuloma in the lingula.    LIVER: Within normal limits.  BILE DUCTS: Normal caliber.  GALLBLADDER: Probable adenomyomatosis at the gallbladder fundus.  SPLEEN: Within normal limits.  PANCREAS: Within normal limits.  ADRENALS: Within normal limits.  KIDNEYS/URETERS: A few subcentimeter hypodense foci in the left kidney   which are too small to characterize.    BLADDER: 2 polypoid soft tissue nodules;  *A nodule along the left posterior wall measures 3.7 x 2.7 x 2.0 cm  *A nodule at the base of the bladder measures 1.2 x 1.4 x 1.8 cm.  Bilateral bladder diverticula noted.  REPRODUCTIVE ORGANS: Uterus and adnexa within normal limits.    BOWEL: No bowel obstruction. Appendix not identified. Colonic   diverticulosis without diverticulitis.  PERITONEUM: No ascites.  VESSELS: Atherosclerotic changes.  RETROPERITONEUM/LYMPH NODES: No lymphadenopathy.  ABDOMINAL WALL: Within normal limits.  BONES: No acute abnormality. Severe T11 compression fracture, most likely   chronic.    IMPRESSION:  2 polypoid bladder masses suspicious for multifocal bladder neoplasm.      Current medications:  acetaminophen     Tablet .. 650 milliGRAM(s) Oral every 6 hours PRN  ascorbic acid 500 milliGRAM(s) Oral daily  iron sucrose Injectable 200 milliGRAM(s) IV Push every 24 hours  levoFLOXacin  Tablet 500 milliGRAM(s) Oral every 24 hours  levothyroxine 25 MICROGram(s) Oral daily  melatonin 3 milliGRAM(s) Oral at bedtime PRN  metoprolol tartrate 12.5 milliGRAM(s) Oral two times a day  multivitamin 1 Tablet(s) Oral daily  ondansetron Injectable 4 milliGRAM(s) IV Push every 8 hours PRN  sodium chloride 0.9%. 1000 milliLiter(s) IV Continuous <Continuous>

## 2022-01-12 NOTE — CDI QUERY NOTE - NSCDIOTHERTXTBX_GEN_ALL_CORE_HH
Clinical documentation indicates that this patient has a history of atrial fibrillation.  Please further specify:    -Paroxysmal: :   -Chronic:  -Other:  -Unable to determine    SUPPORTING DOCUMENTATION AND/OR CLINICAL EVIDENCE:    EKG:< from: 12 Lead ECG (01.09.22 @ 08:43) >    Diagnosis Line Sinus rhythm with 1st degree A-V block  Anterior infarct , age undetermined  Abnormal ECG  When compared with ECG of 05-JAN-2022 16:58,  Aberrant conduction is no longer Present  Nonspecific T wave abnormality no longer evident in Lateral leads  Confirmed by JAMSHID LY MD ([id number]) on 1/10/2022 11:17:03 AM    Cardiology Consult:Consult- Cardiology:  Reason for Consult: clearance for surgery Team Name: Private MD (01-08-22 @ 15:46) (not performed)    Medications (Cardiovascular):metoprolol tartrate 12.5 milliGRAM(s) Oral two times a day    Medicine PN 1/10 Dizziness , multifactorial - Atrial fibrillation, Anemia, functional deconditioning  - anemia, a fib , deconditioning   - check orthostatics - neg , s/p 1 unit PRBC    - c/w low dose of BB   - pt not on AC due to hematuria

## 2022-01-12 NOTE — PROGRESS NOTE ADULT - NUTRITIONAL ASSESSMENT
This patient has been assessed with a concern for Malnutrition and has been determined to have a diagnosis/diagnoses of Severe protein-calorie malnutrition.    This patient is being managed with:   Diet DASH/TLC-  Sodium & Cholesterol Restricted  Prosource Gelatein Plus     Qty per Day:  2  Supplement Feeding Modality:  Oral  Ensure Enlive Cans or Servings Per Day:  1       Frequency:  Two Times a day  Entered: Alexandre 10 2022  9:01AM    
This patient has been assessed with a concern for Malnutrition and has been determined to have a diagnosis/diagnoses of Severe protein-calorie malnutrition.    This patient is being managed with:   Diet DASH/TLC-  Sodium & Cholesterol Restricted  Prosource Gelatein Plus     Qty per Day:  2  Supplement Feeding Modality:  Oral  Ensure Enlive Cans or Servings Per Day:  1       Frequency:  Two Times a day  Entered: Jan 8 2022  1:59PM    
This patient has been assessed with a concern for Malnutrition and has been determined to have a diagnosis/diagnoses of Severe protein-calorie malnutrition.    This patient is being managed with:   Diet DASH/TLC-  Sodium & Cholesterol Restricted  Prosource Gelatein Plus     Qty per Day:  2  Supplement Feeding Modality:  Oral  Ensure Enlive Cans or Servings Per Day:  1       Frequency:  Two Times a day  Entered: Alexandre 10 2022  9:01AM    
This patient has been assessed with a concern for Malnutrition and has been determined to have a diagnosis/diagnoses of Severe protein-calorie malnutrition.    This patient is being managed with:   Diet NPO-  Entered: Alexandre 10 2022  8:58AM

## 2022-01-12 NOTE — PROVIDER CONTACT NOTE (OTHER) - SITUATION
spoke with ans service Sagar regarding consult
spoke with ans service Barak regarding consult
pt complaning of back pain

## 2022-01-12 NOTE — PROGRESS NOTE ADULT - REASON FOR ADMISSION
anemia, bladder nodules

## 2022-01-13 ENCOUNTER — TRANSCRIPTION ENCOUNTER (OUTPATIENT)
Age: 87
End: 2022-01-13

## 2022-01-13 VITALS
DIASTOLIC BLOOD PRESSURE: 56 MMHG | OXYGEN SATURATION: 98 % | SYSTOLIC BLOOD PRESSURE: 109 MMHG | TEMPERATURE: 98 F | HEART RATE: 88 BPM | RESPIRATION RATE: 16 BRPM

## 2022-01-13 LAB
HCT VFR BLD CALC: 32.8 % — LOW (ref 34.5–45)
HGB BLD-MCNC: 9.9 G/DL — LOW (ref 11.5–15.5)

## 2022-01-13 PROCEDURE — 99239 HOSP IP/OBS DSCHRG MGMT >30: CPT

## 2022-01-13 RX ORDER — FERROUS SULFATE 325(65) MG
1 TABLET ORAL
Qty: 0 | Refills: 0 | DISCHARGE

## 2022-01-13 RX ORDER — ASCORBIC ACID 60 MG
1 TABLET,CHEWABLE ORAL
Qty: 30 | Refills: 0
Start: 2022-01-13 | End: 2022-02-11

## 2022-01-13 RX ORDER — PHENAZOPYRIDINE HCL 100 MG
1 TABLET ORAL
Qty: 30 | Refills: 0
Start: 2022-01-13 | End: 2022-01-22

## 2022-01-13 RX ADMIN — Medication 100 MILLIGRAM(S): at 14:31

## 2022-01-13 RX ADMIN — Medication 25 MICROGRAM(S): at 05:31

## 2022-01-13 RX ADMIN — Medication 1 TABLET(S): at 10:08

## 2022-01-13 RX ADMIN — Medication 12.5 MILLIGRAM(S): at 10:08

## 2022-01-13 RX ADMIN — Medication 500 MILLIGRAM(S): at 10:08

## 2022-01-13 NOTE — DISCHARGE NOTE PROVIDER - DETAILS OF MALNUTRITION DIAGNOSIS/DIAGNOSES
This patient has been assessed with a concern for Malnutrition and was treated during this hospitalization for the following Nutrition diagnosis/diagnoses:     -  01/09/2022: Severe protein-calorie malnutrition

## 2022-01-13 NOTE — DISCHARGE NOTE PROVIDER - NSDCMRMEDTOKEN_GEN_ALL_CORE_FT
ascorbic acid 500 mg oral tablet: 1 tab(s) orally once a day  ferrous sulfate 325 mg (65 mg elemental iron) oral tablet: 1 tab(s) orally once a day  levothyroxine 25 mcg (0.025 mg) oral tablet: 1 tab(s) orally once a day  metoprolol tartrate 25 mg oral tablet: 0.5 tab(s) orally 2 times a day  phenazopyridine 100 mg oral tablet: 1 tab(s) orally every 8 hours, As Needed for painful urination

## 2022-01-13 NOTE — DISCHARGE NOTE PROVIDER - PROVIDER TOKENS
PROVIDER:[TOKEN:[[id number]],FOLLOWUP:[1 week]],PROVIDER:[TOKEN:[[id number]],FOLLOWUP:[2 weeks]]

## 2022-01-13 NOTE — DISCHARGE NOTE NURSING/CASE MANAGEMENT/SOCIAL WORK - NSDCPEFALRISK_GEN_ALL_CORE
For information on Fall & Injury Prevention, visit: [email or web address] OR  [email or web address] OR  https://www.cdc.gov/steadi/patient.html

## 2022-01-13 NOTE — DISCHARGE NOTE PROVIDER - HOSPITAL COURSE
Patient is a 89y old  Female who presents with a chief complaint of anemia, bladder nodules      HPI:       89 y/o female with PMHx of HLD, hypothyroidism, macular degeneration, recent diagnosis of bladder mass - suspicion for bladder CA (due to have surgery on Monday, 1/10), in the interim continued to be anemic as she has been having hematuria since her diagnosis. Pt went for pre-op testing on Wednesday and was found to be anemic, and was brought to the hospital, where she received 1 unit of PRBCs. Patient's daughter states she received phone call yesterday saying that patient's hemoglobin was 7 and to bring to the hospital for reevaluation.  Of note, patient with recent admission from 12/11 to 12/14, during that admission patient found to have on CT scan 2 polypoid soft tissue nodules in bladder - a nodule along the left posterior wall measures 3.7 x 2.7 x 2.0 cm and a nodule at the base of the bladder measures 1.2 x 1.4 x 1.8 cm.  Seen and examined in ED. Used Korean  services, patient denies symptoms at this time. Weakness and depression reported per son.  Son Fei  [phone] , [phone] or Errol  [phone]     1/9/22 -  Patient seen and examined at bedside earlier today, Korean  used # [id number] , patient reports feeling weak, unable to get up from the bed, reports hematuria , denies any pain, cp, dyspnea, afebrile, plan of care discussed , all questions is answered   1/10 /22- pt seen and examined, Korean  # [id number] used denies pain, hematuria persist as per RN reports, pt poor historian, denies cp, dyspnea, abdominal pain   1/11- pt seen and examined in am, OOB in the chair, feels better, denies any pain, tolerating po diet, plan discussed   1/12 - pt seen and examined, feels well, some lower abdominal discomfort, tolerating po intake, afebrile, plan for discharge discussed   1/13 - pt seen and examined , denies any pain, tolerating po intake, afebrile , no hematuria   Review of system- Rest of the review of system are negative except mentioned in HPI    Vitals reviewed for last 24 hours  T(C): 36.8 (01-13-22 @ 09:08), Max: 36.8 (01-12-22 @ 15:42)  T(F): 98.2 (01-13-22 @ 09:08), Max: 98.2 (01-12-22 @ 15:42)  HR: 85 (01-12-22 @ 21:27) (75 - 85)  BP: 114/65 (01-12-22 @ 21:27) (104/56 - 114/65)  RR: 18 (01-13-22 @ 09:08) (17 - 18)  SpO2: 99% (01-13-22 @ 09:08) (97% - 99%)  Wt(kg): --    Physical exam:   General : NAD, appear to be of stated age , well groomed   NERVOUS SYSTEM:  Alert & Oriented X2, non- focal exam, Motor Strength 5/5 B/L upper and lower extremities; DTRs 2+ intact and symmetric  HEAD:  Atraumatic, Normocephalic  EYES: EOMI, PERRLA, conjunctiva and sclera clear  HEENT: Moist mucous membranes, Supple neck , No JVD  CHEST: Clear to auscultation bilaterally; No rales, no rhonchi, no wheezing  HEART: Regular rate and rhythm; No murmurs, no rubs or gallops  ABDOMEN: Soft, Non-tender, Non-distended; Bowel sounds present, no guarding , no peritoneal irritation   GENITOURINARY- Voiding, no suprapubic tenderness  EXTREMITIES:  2+ Peripheral Pulses, No clubbing, cyanosis,   edema  MUSCULOSKELETAL:- No muscle tenderness, Muscle tone normal, No joint tenderness, no Joint swelling,  Joint ROM –normal   SKIN-no rash, no lesion    LABS: all reviewed  RADIOLOGY & ADDITIONAL TESTS: all reviewed     Hematuria with 2 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 Klein  - cardiology clearance noted Dr. Burris - pt moderate risk for intermedium risk procedure   - Monitor H&H , Hb > 8 , s/p 1 unit PRBC on 1/5/21 , s/p 1 unit PRBC  on 1/9/21   - ECG - noted, CXR - abnormal opacity on left 4 th rib   - 1/10- s/p Transurethral surgical removal or destruction of large neoplasm or multiple neoplasms of bladder for more than 30 minutes Instillation of mitomycin C into bladder  - pathology consistent with low grade papillary carcinoma  - Brandt care, voiding trial successful   Dizziness , multifactorial - Atrial fibrillation, Anemia, functional deconditioning  - anemia, a fib , deconditioning   - check orthostatics - neg , s/p 1 unit PRBC    - c/w low dose of BB   - pt not on AC due to hematuria   Acute on chronic acute blood loss anemia , Iron deficiency   - Multifactorial ~ due to hematuria from probable bladder neoplasms and iron deficiency   - S/p 1U of PRBCs on 1/5, patient sent to hospital for anemia, received a blood transfusion and discharged home.  - Monitor H&H, stable. check B12 wnl   - s/p IV iron, with Vitamin C  Abnormal CXR 1.8 x 0.7 cm overlies opacity overlies LEFT anterior fourth rib of indeterminate etiology.  - asymptomatic   - may need CT chest - can be done as o/p   Hypothyroidism, under controlled with TSH 8   - Patient started on synthroid 12/11   - C/w synthroid 25mcg, repeat TFTs in 3-4 weeks  as o/p   - check free T4  HLD with     - Patient previously on lovastatin, has not renewed prescription in several months , PCP stop statins as per family  Generalized weakness, Chronic T11 compression fracture  - PT evaluation, check vitamin D, B12 level   Probable adenomyomatosis at the gallbladder fundus on CT 12/11/21 - o/p work-up and monitoring   Hypoalbuminemia. Malnutrition - Nutrition eval ,  Added ensure w/ meals, MVI daily   Paroxysmal A fib - hold AC for now, patient will follow up with cardiologist to restart AC once cleared by urology team  GOC / Advanced Care Planning:  -  Spoke w/ Son Fei (HCP) [phone]  updated 1/9/22 , 1/10 , 1/11, 1/12, 1/13   -Anasthesia [phone] updated 1/12   -  FULL CODE     DVT ppx - SCDs in setting of anemia  Disposition - medically optimized to be discharged home with close follow up with PCP, cardiology, urology within  1-2 weeks  return to ED if fever, abdominal pain, nausea, vomiting, chest pain, dyspnea  Discharge plan discussed with patient, RN  Patient advised to follow up with PCP within 3-7 days  time spend 40 min  Discharge note faxed to PCP with my contact information to call me back   PCP  Dr. Rodriguez

## 2022-01-13 NOTE — DISCHARGE NOTE PROVIDER - CARE PROVIDER_API CALL
Elenita Rodriguez)  Internal Medicine  [address] [place] [address]  Phone: [phone]  Fax: [phone]  Follow Up Time: 1 week    Wesley Klein)  Urology  [address] [facility], [address] [place] [address]  Phone: [phone]  Fax: [phone]  Follow Up Time: 2 weeks

## 2022-01-13 NOTE — DISCHARGE NOTE PROVIDER - NSDCCPCAREPLAN_GEN_ALL_CORE_FT
PRINCIPAL DISCHARGE DIAGNOSIS  Diagnosis: Anemia  Assessment and Plan of Treatment: take iron with vitamin C , repeat blood test in 1 week, follow up with PCP within 1 week for further preventive care      SECONDARY DISCHARGE DIAGNOSES  Diagnosis: Primary bladder papillary carcinoma  Assessment and Plan of Treatment: you have surgical resection of the bladder cancer, follow up with urologist within 1 week for further monitoring and management    Diagnosis: Weakness with dizziness  Assessment and Plan of Treatment: drink plenty of fluids, change position of the body from lying to sitting and walking slow, follow up with PCp within 1 week for further monitoring    Diagnosis: Paroxysmal atrial fibrillation  Assessment and Plan of Treatment: follow up with cardiologist within 1 week for further management and resastarting blood thinner to prevent strokes    Diagnosis: Abnormality of rib determined by X-ray  Assessment and Plan of Treatment: 1.8x0.7 cm opacity on left anterior rib on xray, follow up with PCP within 1 week for further work-up and monitoring    Diagnosis: Adenomyomatosis of gallbladder  Assessment and Plan of Treatment: noted on CT from 12/11/21 - follow up with PCP for further monitoring and work-up

## 2022-01-13 NOTE — DISCHARGE NOTE NURSING/CASE MANAGEMENT/SOCIAL WORK - PATIENT PORTAL LINK FT
You can access the FollowMyHealth Patient Portal offered by [facility] by registering at the following website: [email or web address]. By joining [facility]’s FollowMyHealth portal, you will also be able to view your health information using other applications (apps) compatible with our system.

## 2022-01-14 ENCOUNTER — NON-APPOINTMENT (OUTPATIENT)
Age: 87
End: 2022-01-14

## 2022-01-14 PROBLEM — C67.9 MALIGNANT NEOPLASM OF BLADDER, UNSPECIFIED: Chronic | Status: ACTIVE | Noted: 2022-01-08

## 2022-01-14 PROBLEM — H35.30 UNSPECIFIED MACULAR DEGENERATION: Chronic | Status: ACTIVE | Noted: 2022-01-08

## 2022-01-14 PROBLEM — E03.9 HYPOTHYROIDISM, UNSPECIFIED: Chronic | Status: ACTIVE | Noted: 2022-01-08

## 2022-01-18 ENCOUNTER — APPOINTMENT (OUTPATIENT)
Dept: UROLOGY | Facility: CLINIC | Age: 87
End: 2022-01-18

## 2022-01-21 ENCOUNTER — APPOINTMENT (OUTPATIENT)
Dept: INTERNAL MEDICINE | Facility: CLINIC | Age: 87
End: 2022-01-21
Payer: COMMERCIAL

## 2022-01-21 VITALS
BODY MASS INDEX: 19.78 KG/M2 | TEMPERATURE: 98 F | HEART RATE: 82 BPM | SYSTOLIC BLOOD PRESSURE: 129 MMHG | HEIGHT: 67 IN | WEIGHT: 126 LBS | DIASTOLIC BLOOD PRESSURE: 69 MMHG | OXYGEN SATURATION: 97 % | RESPIRATION RATE: 18 BRPM

## 2022-01-21 DIAGNOSIS — Z09 ENCOUNTER FOR FOLLOW-UP EXAMINATION AFTER COMPLETED TREATMENT FOR CONDITIONS OTHER THAN MALIGNANT NEOPLASM: ICD-10-CM

## 2022-01-21 PROCEDURE — 99214 OFFICE O/P EST MOD 30 MIN: CPT

## 2022-01-21 RX ORDER — CHLORHEXIDINE GLUCONATE 4 %
325 (65 FE) LIQUID (ML) TOPICAL TWICE DAILY
Qty: 180 | Refills: 1 | Status: COMPLETED | COMMUNITY
Start: 2021-12-21 | End: 2022-01-21

## 2022-01-24 PROBLEM — Z09 HOSPITAL DISCHARGE FOLLOW-UP: Status: ACTIVE | Noted: 2022-01-24

## 2022-01-24 NOTE — HISTORY OF PRESENT ILLNESS
[Post-hospitalization from ___ Hospital] : Post-hospitalization from [unfilled] Hospital [Admitted on: ___] : The patient was admitted on [unfilled] [Discharged on ___] : discharged on [unfilled] [Discharge Summary] : discharge summary [Discharge Med List] : discharge medication list [Med Reconciliation] : medication reconciliation has been completed [Patient Contacted By: ____] : and contacted by [unfilled] [FreeTextEntry2] : Ms. SHAW is here today for a f/u visit.\par over all she is doing better, she is here with her daughter nina.\par came walking with the support of cane.\par denied any more blood in urine. Her appetite is getting better.\par \par Hospital admission:\par PMHx of HLD, hypothyroidism, macular degeneration.\par Recent diagnosis of bladder mass - suspicion for bladder CA .\par She was scheduled  to have surgery on Monday, 1/10, in the interim continued to be anemic as she has been having hematuria since her diagnosis. Patient was  admitted to medicine service for  acute on chronic anemia likely due to probable bladder neoplasms.  \par  She had Transurethral surgical removal or destruction of large neoplasm or multiple\par neoplasms of bladder  and Instillation of mitomycin C into bladder  on 10-Alexandre-2022 by Dr Wesley Klein,\par post surgery she was monitored in the hospital , after stable she was discharged home.\par \par Operative Findings\par - Operative Findings 1. A very large papillary tumor occupying left floor,\par bladder neck and lateral wall of bladder > 5 cm\par 2. Small papillary tumor along right floor of bladder

## 2022-01-24 NOTE — ASSESSMENT
[FreeTextEntry1] : Bladder cancer:\par Low-grade papillary urothelial cancer, noninvasive.\par Status post surgery.\par Have blood loss anemia.\par Ordered CBC and CMP.\par Currently patient reported no blood in the urine.\par \par Generalized weakness:\par Order home PT and home aide service.\par Patient needs full assistance in all her ADLs.\par Walks with a cane.\par

## 2022-01-24 NOTE — REVIEW OF SYSTEMS
[Fatigue] : fatigue [Unsteady Walking] : ataxia [Negative] : Respiratory [Fever] : no fever [Chills] : no chills [Abdominal Pain] : no abdominal pain [Nausea] : no nausea [Constipation] : no constipation [Diarrhea] : diarrhea [Dysuria] : no dysuria [Incontinence] : no incontinence [Hematuria] : no hematuria [Frequency] : no frequency [Headache] : no headache [Dizziness] : no dizziness [Fainting] : no fainting [Confusion] : no confusion [FreeTextEntry9] : +generalized weakness. needs full assistance on her ADLs.

## 2022-01-24 NOTE — PHYSICAL EXAM
[Ill-Appearing] : ill-appearing [Soft] : abdomen soft [Non Tender] : non-tender [Normal Bowel Sounds] : normal bowel sounds [Coordination Grossly Intact] : coordination grossly intact [No Focal Deficits] : no focal deficits [Normal] : affect was normal and insight and judgment were intact [de-identified] : Looks weak. [de-identified] : + Generalized weakness, unsteady gait, walks with support of cane, needs full assistance in all her ADLs. [de-identified] : unsteady gait.

## 2022-01-27 LAB
ALBUMIN SERPL ELPH-MCNC: 4 G/DL
ALP BLD-CCNC: 76 U/L
ALT SERPL-CCNC: 8 U/L
ANION GAP SERPL CALC-SCNC: 13 MMOL/L
AST SERPL-CCNC: 20 U/L
BASOPHILS # BLD AUTO: 0.02 K/UL
BASOPHILS NFR BLD AUTO: 0.4 %
BILIRUB SERPL-MCNC: 0.2 MG/DL
BUN SERPL-MCNC: 17 MG/DL
CALCIUM SERPL-MCNC: 9.5 MG/DL
CHLORIDE SERPL-SCNC: 104 MMOL/L
CO2 SERPL-SCNC: 25 MMOL/L
CREAT SERPL-MCNC: 0.87 MG/DL
EOSINOPHIL # BLD AUTO: 0.11 K/UL
EOSINOPHIL NFR BLD AUTO: 2 %
GLUCOSE SERPL-MCNC: 110 MG/DL
HCT VFR BLD CALC: 33.9 %
HGB BLD-MCNC: 10.5 G/DL
IMM GRANULOCYTES NFR BLD AUTO: 1.4 %
LYMPHOCYTES # BLD AUTO: 0.81 K/UL
LYMPHOCYTES NFR BLD AUTO: 14.6 %
MAN DIFF?: NORMAL
MCHC RBC-ENTMCNC: 30.3 PG
MCHC RBC-ENTMCNC: 31 GM/DL
MCV RBC AUTO: 97.7 FL
MONOCYTES # BLD AUTO: 0.54 K/UL
MONOCYTES NFR BLD AUTO: 9.7 %
NEUTROPHILS # BLD AUTO: 3.98 K/UL
NEUTROPHILS NFR BLD AUTO: 71.9 %
PLATELET # BLD AUTO: 255 K/UL
POTASSIUM SERPL-SCNC: 4.9 MMOL/L
PROT SERPL-MCNC: 6.7 G/DL
RBC # BLD: 3.47 M/UL
RBC # FLD: 13.2 %
SODIUM SERPL-SCNC: 141 MMOL/L
WBC # FLD AUTO: 5.54 K/UL

## 2022-02-15 ENCOUNTER — APPOINTMENT (OUTPATIENT)
Dept: INTERNAL MEDICINE | Facility: CLINIC | Age: 87
End: 2022-02-15
Payer: COMMERCIAL

## 2022-02-15 VITALS
OXYGEN SATURATION: 92 % | HEART RATE: 82 BPM | DIASTOLIC BLOOD PRESSURE: 78 MMHG | BODY MASS INDEX: 19.3 KG/M2 | TEMPERATURE: 98.6 F | WEIGHT: 123 LBS | SYSTOLIC BLOOD PRESSURE: 112 MMHG | RESPIRATION RATE: 15 BRPM | HEIGHT: 67 IN

## 2022-02-15 DIAGNOSIS — R31.0 GROSS HEMATURIA: ICD-10-CM

## 2022-02-15 DIAGNOSIS — E74.39 OTHER DISORDERS OF INTESTINAL CARBOHYDRATE ABSORPTION: ICD-10-CM

## 2022-02-15 DIAGNOSIS — R53.1 WEAKNESS: ICD-10-CM

## 2022-02-15 PROCEDURE — 81003 URINALYSIS AUTO W/O SCOPE: CPT | Mod: QW

## 2022-02-15 PROCEDURE — 99072 ADDL SUPL MATRL&STAF TM PHE: CPT

## 2022-02-15 PROCEDURE — 99214 OFFICE O/P EST MOD 30 MIN: CPT

## 2022-02-15 RX ORDER — LOVASTATIN 20 MG/1
20 TABLET ORAL
Qty: 85 | Refills: 0 | Status: COMPLETED | COMMUNITY
Start: 2021-01-05 | End: 2022-02-15

## 2022-02-15 RX ORDER — LEVOTHYROXINE SODIUM 0.1 MG/1
100 TABLET ORAL
Qty: 30 | Refills: 0 | Status: DISCONTINUED | COMMUNITY
Start: 2021-12-14 | End: 2022-02-15

## 2022-02-15 RX ORDER — LEVOFLOXACIN 500 MG/1
500 TABLET, FILM COATED ORAL
Qty: 7 | Refills: 0 | Status: ACTIVE | COMMUNITY
Start: 2021-12-26 | End: 1900-01-01

## 2022-02-15 NOTE — HISTORY OF PRESENT ILLNESS
[Family Member] : family member [FreeTextEntry1] : Follow-up on anemia, hyperlipidemia, hypothyroid, generalized weakness. [de-identified] : Ms. SHAW is here today for a f/u visit.\par She is here with her daughter-in-law who does the translation.\par As per the daughter-in-law patient is slowly recovering, she is still unsteady when she is walking uses walker.\par Her appetite is coming back slowly though she is not eating her regular diet.\par Sometimes she seems to be little confused.\par She is not seeing any blood in her urine.\par Hyperlipidemia not on any medication.\par Hypothyroidism on levothyroxine 25 mcg.\par \par

## 2022-02-15 NOTE — ASSESSMENT
[FreeTextEntry1] : Generalized weakness:\par Referred to physical therapy.\par \par Glucose intolerance:\par Ordered HbA1c.  Advised to cut on sweets.\par Mental confusion:\par Urine dipstick: Positive leukocytes.  Moderate blood.  Positive protein.\par Prescribed levofloxacin 500 mg once a day for 7 days.\par Increase water intake.\par \par Hyperlipidemia:\par Not on meds\par Ordered lipid profile\par \par Hypothyroidism:\par On levothyroxine 25 mcg\par Order TSH\par \par Bladder tumor:\par Status post surgery.\par She have a follow-up appointment with urology in April..\par

## 2022-02-15 NOTE — REVIEW OF SYSTEMS
[Fever] : no fever [Chills] : no chills [Fatigue] : no fatigue [Negative] : Genitourinary [FreeTextEntry2] : Generalized weakness.

## 2022-02-15 NOTE — PHYSICAL EXAM
[Normal] : normal rate, regular rhythm, normal S1 and S2 and no murmur heard [No Joint Swelling] : no joint swelling [de-identified] : Walks with a walker.  Decreased strength in upper and lower extremity bilateral.

## 2022-02-17 LAB
APPEARANCE: ABNORMAL
BACTERIA: NEGATIVE
BASOPHILS # BLD AUTO: 0.04 K/UL
BASOPHILS NFR BLD AUTO: 0.6 %
BILIRUBIN URINE: NEGATIVE
BLOOD URINE: ABNORMAL
CHOLEST SERPL-MCNC: 241 MG/DL
COLOR: ABNORMAL
EOSINOPHIL # BLD AUTO: 0.1 K/UL
EOSINOPHIL NFR BLD AUTO: 1.4 %
ESTIMATED AVERAGE GLUCOSE: 100 MG/DL
GLUCOSE QUALITATIVE U: NEGATIVE
HBA1C MFR BLD HPLC: 5.1 %
HCT VFR BLD CALC: 35.3 %
HDLC SERPL-MCNC: 53 MG/DL
HGB BLD-MCNC: 10.7 G/DL
HYALINE CASTS: 4 /LPF
IMM GRANULOCYTES NFR BLD AUTO: 0.7 %
KETONES URINE: NORMAL
LDLC SERPL CALC-MCNC: 145 MG/DL
LEUKOCYTE ESTERASE URINE: ABNORMAL
LYMPHOCYTES # BLD AUTO: 1.27 K/UL
LYMPHOCYTES NFR BLD AUTO: 18.2 %
MAN DIFF?: NORMAL
MCHC RBC-ENTMCNC: 28.3 PG
MCHC RBC-ENTMCNC: 30.3 GM/DL
MCV RBC AUTO: 93.4 FL
MICROSCOPIC-UA: NORMAL
MONOCYTES # BLD AUTO: 0.69 K/UL
MONOCYTES NFR BLD AUTO: 9.9 %
NEUTROPHILS # BLD AUTO: 4.82 K/UL
NEUTROPHILS NFR BLD AUTO: 69.2 %
NITRITE URINE: NEGATIVE
NONHDLC SERPL-MCNC: 188 MG/DL
PH URINE: 5.5
PLATELET # BLD AUTO: 300 K/UL
PROTEIN URINE: ABNORMAL
RBC # BLD: 3.78 M/UL
RBC # FLD: 13.6 %
RED BLOOD CELLS URINE: 7 /HPF
SPECIFIC GRAVITY URINE: 1.03
SQUAMOUS EPITHELIAL CELLS: 1 /HPF
TRIGL SERPL-MCNC: 217 MG/DL
TSH SERPL-ACNC: 3.31 UIU/ML
UROBILINOGEN URINE: ABNORMAL
WBC # FLD AUTO: 6.97 K/UL
WHITE BLOOD CELLS URINE: 44 /HPF

## 2022-02-17 RX ORDER — LEVOFLOXACIN 250 MG/1
250 TABLET, FILM COATED ORAL
Qty: 7 | Refills: 0 | Status: DISCONTINUED | COMMUNITY
Start: 2021-12-26 | End: 2022-02-17

## 2022-02-17 RX ORDER — SULFAMETHOXAZOLE AND TRIMETHOPRIM 800; 160 MG/1; MG/1
800-160 TABLET ORAL
Qty: 20 | Refills: 0 | Status: DISCONTINUED | COMMUNITY
Start: 2021-12-01 | End: 2022-02-17

## 2022-03-16 ENCOUNTER — NON-APPOINTMENT (OUTPATIENT)
Age: 87
End: 2022-03-16

## 2022-03-28 NOTE — ED ADULT NURSE NOTE - NS ED NOTE  TALK SOMEONE YN
Detail Level: Detailed Quality 110: Preventive Care And Screening: Influenza Immunization: Influenza Immunization Administered during Influenza season Quality 226: Preventive Care And Screening: Tobacco Use: Screening And Cessation Intervention: Patient screened for tobacco use, is a smoker AND received Cessation Counseling Quality 402: Tobacco Use And Help With Quitting Among Adolescents: Patient screened for tobacco and is a smoker AND received Cessation Counseling No

## 2022-04-05 ENCOUNTER — APPOINTMENT (OUTPATIENT)
Dept: UROLOGY | Facility: CLINIC | Age: 87
End: 2022-04-05
Payer: COMMERCIAL

## 2022-04-05 VITALS — TEMPERATURE: 97.7 F | DIASTOLIC BLOOD PRESSURE: 73 MMHG | HEART RATE: 84 BPM | SYSTOLIC BLOOD PRESSURE: 148 MMHG

## 2022-04-05 LAB
APPEARANCE: CLEAR
BILIRUBIN URINE: NEGATIVE
BLOOD URINE: NEGATIVE
COLOR: NORMAL
GLUCOSE QUALITATIVE U: NEGATIVE
KETONES URINE: NEGATIVE
LEUKOCYTE ESTERASE URINE: NEGATIVE
NITRITE URINE: NEGATIVE
PH URINE: 6.5
PROTEIN URINE: NORMAL
SPECIFIC GRAVITY URINE: 1.02
UROBILINOGEN URINE: NORMAL

## 2022-04-05 PROCEDURE — 52000 CYSTOURETHROSCOPY: CPT

## 2022-05-05 NOTE — DISCHARGE NOTE NURSING/CASE MANAGEMENT/SOCIAL WORK - NSDCPEFALRISK_GEN_ALL_CORE
Patient is here today for Vaginal itching.Pt states vaginal itching and discharge for the last two weeks.Her last pap showed: 4/27/2020 negative    Her last menses started UNKNOWN and she reports her cycles to be absent.   Current form of birth control NONE and is satisfied.  Latex:  Patient denies allergy to latex.  Medications reviewed with patient.  Tobacco use verified.  Allergies verified.    Patient would like communication of their results via:   [facility]     Patient's current [facility] status: Active.     Chaperone needed:  NO       For information on Fall & Injury Prevention, visit: [email or web address] OR  [email or web address] OR  https://www.cdc.gov/steadi/patient.html

## 2022-06-19 ENCOUNTER — FORM ENCOUNTER (OUTPATIENT)
Age: 87
End: 2022-06-19

## 2022-07-06 ENCOUNTER — NON-APPOINTMENT (OUTPATIENT)
Age: 87
End: 2022-07-06

## 2022-07-06 ENCOUNTER — APPOINTMENT (OUTPATIENT)
Dept: INTERNAL MEDICINE | Facility: CLINIC | Age: 87
End: 2022-07-06

## 2022-07-06 VITALS
TEMPERATURE: 97.8 F | SYSTOLIC BLOOD PRESSURE: 135 MMHG | WEIGHT: 149 LBS | HEIGHT: 67 IN | BODY MASS INDEX: 23.39 KG/M2 | HEART RATE: 74 BPM | OXYGEN SATURATION: 95 % | RESPIRATION RATE: 15 BRPM | DIASTOLIC BLOOD PRESSURE: 70 MMHG

## 2022-07-06 DIAGNOSIS — R60.0 LOCALIZED EDEMA: ICD-10-CM

## 2022-07-06 PROCEDURE — 99215 OFFICE O/P EST HI 40 MIN: CPT | Mod: 25

## 2022-07-06 PROCEDURE — 36415 COLL VENOUS BLD VENIPUNCTURE: CPT

## 2022-07-06 PROCEDURE — 93000 ELECTROCARDIOGRAM COMPLETE: CPT

## 2022-07-06 RX ORDER — METOPROLOL TARTRATE 25 MG/1
25 TABLET, FILM COATED ORAL
Refills: 0 | Status: DISCONTINUED | COMMUNITY
Start: 2022-01-03 | End: 2022-07-06

## 2022-07-06 NOTE — ASSESSMENT
[FreeTextEntry1] : Hypertension and arrhythmia:\par She was started on metoprolol tartrate 25 mg half tablet twice daily for arrhythmia.  As per the daughter-in-law lipidemia was because she was having blood loss in the urine.\par She is asking whether she should continue with metoprolol at this point since she is feeling dizzy.\par EKG done today shows sinus rhythm and worsening of P waves in lead II.\par I will change to metoprolol succinate 25 mg half tablet once a day.\par She is to also discussed with cardiologist.\par Will get records from cardiology.\par \par Anemia:\par From blood loss.  She is on iron supplement currently.  Ordered CBC and iron profile.\par \par Bilateral leg edema:\par Ordered CMP, TSH.\par Prescribed Lasix 20 mg 1 tablet every other day for 1 week and then as needed.\par Keep both legs elevated while sitting.\par Recommended compressive stockings to be used during the day..\par Salt restriction..\par Return in 2 months..

## 2022-07-06 NOTE — HISTORY OF PRESENT ILLNESS
[Family Member] : family member [FreeTextEntry1] : f/u on HTN AND CHRONIC MEDICAL CONDITION. [de-identified] : Ms. ARAVIND SHAW  is    89 year female .  She is here with her daughter-in-law.\par Her appetite is better she likes to eat sweet.  Seen by cardiology have swelling of bilateral leg up to the knee.  Cardiologist advised to keep her feet elevated .\par Complain of feeling dizzy, patient stated she is she is blurry, have macular degeneration both eyes sees eye doctor.\par Getting physical therapy at home.\par Pt. is over all feeling well.\par Increased 26 pound of weight since February.\par No change is bowel and bladder habit.\par

## 2022-07-06 NOTE — REVIEW OF SYSTEMS
[Chest Pain] : no chest pain [Palpitations] : no palpitations [Leg Claudication] : no leg claudication [Lower Ext Edema] : lower extremity edema [Orthopnea] : no orthopnea [Joint Pain] : no joint pain [Joint Stiffness] : no joint stiffness [Muscle Weakness] : muscle weakness [Muscle Pain] : no muscle pain [Headache] : no headache [Dizziness] : dizziness [Fainting] : no fainting [Confusion] : no confusion [Memory Loss] : no memory loss [Unsteady Walking] : ataxia [Negative] : Genitourinary

## 2022-07-06 NOTE — PHYSICAL EXAM
[No Varicosities] : no varicosities [Pedal Pulses Present] : the pedal pulses are present [Normal] : soft, non-tender, non-distended, no masses palpated, no HSM and normal bowel sounds [de-identified] : Swelling of both the legs up to the knee joint, skin feels tight, no blisters. [de-identified] : Unsteady gait walks with support, walker use in the house.

## 2022-07-07 ENCOUNTER — FORM ENCOUNTER (OUTPATIENT)
Age: 87
End: 2022-07-07

## 2022-07-12 LAB
ALBUMIN SERPL ELPH-MCNC: 4.3 G/DL
ALP BLD-CCNC: 74 U/L
ALT SERPL-CCNC: 18 U/L
ANION GAP SERPL CALC-SCNC: 12 MMOL/L
AST SERPL-CCNC: 27 U/L
BASOPHILS # BLD AUTO: 0.04 K/UL
BASOPHILS NFR BLD AUTO: 0.6 %
BILIRUB SERPL-MCNC: 0.3 MG/DL
BUN SERPL-MCNC: 23 MG/DL
CALCIUM SERPL-MCNC: 9.7 MG/DL
CHLORIDE SERPL-SCNC: 104 MMOL/L
CO2 SERPL-SCNC: 27 MMOL/L
CREAT SERPL-MCNC: 0.73 MG/DL
EGFR: 78 ML/MIN/1.73M2
EOSINOPHIL # BLD AUTO: 0.27 K/UL
EOSINOPHIL NFR BLD AUTO: 4.2 %
FERRITIN SERPL-MCNC: 133 NG/ML
GLUCOSE SERPL-MCNC: 85 MG/DL
HCT VFR BLD CALC: 38.6 %
HGB BLD-MCNC: 12 G/DL
IMM GRANULOCYTES NFR BLD AUTO: 0.3 %
IRON SATN MFR SERPL: 26 %
IRON SERPL-MCNC: 70 UG/DL
LYMPHOCYTES # BLD AUTO: 1.69 K/UL
LYMPHOCYTES NFR BLD AUTO: 26.2 %
MAN DIFF?: NORMAL
MCHC RBC-ENTMCNC: 29.6 PG
MCHC RBC-ENTMCNC: 31.1 GM/DL
MCV RBC AUTO: 95.3 FL
MONOCYTES # BLD AUTO: 0.86 K/UL
MONOCYTES NFR BLD AUTO: 13.3 %
NEUTROPHILS # BLD AUTO: 3.58 K/UL
NEUTROPHILS NFR BLD AUTO: 55.4 %
PLATELET # BLD AUTO: 247 K/UL
POTASSIUM SERPL-SCNC: 4.8 MMOL/L
PROT SERPL-MCNC: 6.9 G/DL
RBC # BLD: 4.05 M/UL
RBC # FLD: 13.9 %
SODIUM SERPL-SCNC: 143 MMOL/L
TIBC SERPL-MCNC: 272 UG/DL
TRANSFERRIN SERPL-MCNC: 234 MG/DL
TSH SERPL-ACNC: 3.64 UIU/ML
UIBC SERPL-MCNC: 202 UG/DL
WBC # FLD AUTO: 6.46 K/UL

## 2022-10-04 ENCOUNTER — APPOINTMENT (OUTPATIENT)
Dept: UROLOGY | Facility: CLINIC | Age: 87
End: 2022-10-04

## 2022-10-04 VITALS
BODY MASS INDEX: 28.25 KG/M2 | HEIGHT: 67 IN | SYSTOLIC BLOOD PRESSURE: 168 MMHG | HEART RATE: 97 BPM | TEMPERATURE: 97.9 F | WEIGHT: 180 LBS | DIASTOLIC BLOOD PRESSURE: 81 MMHG

## 2022-10-04 LAB
BILIRUBIN URINE: NORMAL
BLOOD URINE: NORMAL
GLUCOSE QUALITATIVE U: NORMAL
KETONES URINE: NORMAL
LEUKOCYTE ESTERASE URINE: NORMAL
NITRITE URINE: NORMAL
PH URINE: 5.5
PROTEIN URINE: NORMAL
SPECIFIC GRAVITY URINE: 1.01
UROBILINOGEN URINE: 0.2

## 2022-10-04 PROCEDURE — 52000 CYSTOURETHROSCOPY: CPT

## 2022-10-05 LAB
APPEARANCE: CLEAR
BACTERIA: NEGATIVE
BILIRUBIN URINE: NEGATIVE
BLOOD URINE: NEGATIVE
COLOR: NORMAL
GLUCOSE QUALITATIVE U: NEGATIVE
HYALINE CASTS: 3 /LPF
KETONES URINE: NEGATIVE
LEUKOCYTE ESTERASE URINE: NEGATIVE
MICROSCOPIC-UA: NORMAL
NITRITE URINE: NEGATIVE
PH URINE: 6
PROTEIN URINE: NEGATIVE
RED BLOOD CELLS URINE: 1 /HPF
SPECIFIC GRAVITY URINE: 1.02
SQUAMOUS EPITHELIAL CELLS: 0 /HPF
UROBILINOGEN URINE: NORMAL
WHITE BLOOD CELLS URINE: 1 /HPF

## 2022-10-06 LAB
BACTERIA UR CULT: NORMAL
URINE CYTOLOGY: NORMAL

## 2022-11-01 ENCOUNTER — NON-APPOINTMENT (OUTPATIENT)
Age: 87
End: 2022-11-01

## 2022-11-02 ENCOUNTER — APPOINTMENT (OUTPATIENT)
Dept: INTERNAL MEDICINE | Facility: CLINIC | Age: 87
End: 2022-11-02

## 2022-11-02 VITALS
HEART RATE: 97 BPM | TEMPERATURE: 97.1 F | WEIGHT: 148 LBS | DIASTOLIC BLOOD PRESSURE: 70 MMHG | SYSTOLIC BLOOD PRESSURE: 120 MMHG | BODY MASS INDEX: 23.23 KG/M2 | RESPIRATION RATE: 15 BRPM | OXYGEN SATURATION: 98 % | HEIGHT: 67 IN

## 2022-11-02 DIAGNOSIS — D64.9 ANEMIA, UNSPECIFIED: ICD-10-CM

## 2022-11-02 DIAGNOSIS — N39.0 URINARY TRACT INFECTION, SITE NOT SPECIFIED: ICD-10-CM

## 2022-11-02 DIAGNOSIS — R30.0 DYSURIA: ICD-10-CM

## 2022-11-02 PROCEDURE — 99072 ADDL SUPL MATRL&STAF TM PHE: CPT

## 2022-11-02 PROCEDURE — 99214 OFFICE O/P EST MOD 30 MIN: CPT | Mod: 25

## 2022-11-02 PROCEDURE — 81003 URINALYSIS AUTO W/O SCOPE: CPT | Mod: QW

## 2022-11-02 RX ORDER — SULFAMETHOXAZOLE AND TRIMETHOPRIM 800; 160 MG/1; MG/1
800-160 TABLET ORAL TWICE DAILY
Qty: 6 | Refills: 0 | Status: ACTIVE | COMMUNITY
Start: 2022-11-02 | End: 1900-01-01

## 2022-11-03 ENCOUNTER — RESULT CHARGE (OUTPATIENT)
Age: 87
End: 2022-11-03

## 2022-11-03 ENCOUNTER — NON-APPOINTMENT (OUTPATIENT)
Age: 87
End: 2022-11-03

## 2022-11-03 LAB
ALBUMIN SERPL ELPH-MCNC: 4.1 G/DL
ALP BLD-CCNC: 72 U/L
ALT SERPL-CCNC: 22 U/L
ANION GAP SERPL CALC-SCNC: 10 MMOL/L
AST SERPL-CCNC: 32 U/L
BASOPHILS # BLD AUTO: 0.06 K/UL
BASOPHILS NFR BLD AUTO: 1 %
BILIRUB SERPL-MCNC: 0.3 MG/DL
BILIRUB UR QL STRIP: NEGATIVE
BUN SERPL-MCNC: 25 MG/DL
CALCIUM SERPL-MCNC: 9.8 MG/DL
CHLORIDE SERPL-SCNC: 103 MMOL/L
CLARITY UR: CLEAR
CO2 SERPL-SCNC: 28 MMOL/L
COLLECTION METHOD: NORMAL
CREAT SERPL-MCNC: 1.05 MG/DL
EGFR: 50 ML/MIN/1.73M2
EOSINOPHIL # BLD AUTO: 0.22 K/UL
EOSINOPHIL NFR BLD AUTO: 3.7 %
GLUCOSE SERPL-MCNC: 104 MG/DL
GLUCOSE UR-MCNC: NEGATIVE
HCG UR QL: 0.2 EU/DL
HCT VFR BLD CALC: 36.9 %
HGB BLD-MCNC: 11.8 G/DL
HGB UR QL STRIP.AUTO: NEGATIVE
IMM GRANULOCYTES NFR BLD AUTO: 0.3 %
KETONES UR-MCNC: NEGATIVE
LEUKOCYTE ESTERASE UR QL STRIP: NORMAL
LYMPHOCYTES # BLD AUTO: 1.56 K/UL
LYMPHOCYTES NFR BLD AUTO: 26 %
MAN DIFF?: NORMAL
MCHC RBC-ENTMCNC: 29.9 PG
MCHC RBC-ENTMCNC: 32 GM/DL
MCV RBC AUTO: 93.7 FL
MONOCYTES # BLD AUTO: 0.81 K/UL
MONOCYTES NFR BLD AUTO: 13.5 %
NEUTROPHILS # BLD AUTO: 3.34 K/UL
NEUTROPHILS NFR BLD AUTO: 55.5 %
NITRITE UR QL STRIP: NEGATIVE
PH UR STRIP: 5.5
PLATELET # BLD AUTO: 314 K/UL
POTASSIUM SERPL-SCNC: 4.5 MMOL/L
PROT SERPL-MCNC: 7.1 G/DL
PROT UR STRIP-MCNC: NEGATIVE
RBC # BLD: 3.94 M/UL
RBC # FLD: 13.8 %
SODIUM SERPL-SCNC: 141 MMOL/L
SP GR UR STRIP: 1.02
WBC # FLD AUTO: 6.01 K/UL

## 2022-11-03 NOTE — PHYSICAL EXAM
[Normal] : soft, non-tender, non-distended, no masses palpated, no HSM and normal bowel sounds [de-identified] : +grade 2 edema both legs , she have compressive stocking s on .

## 2022-11-03 NOTE — ASSESSMENT
[FreeTextEntry1] : Low back pain :\par she uses diaper which she have been changing every 1 hr recently.\par urine dip : +ve for leukocytes.\par Prescribed bactrim DS i tab bid for 3 days.\par \par HTN: BP is at goal she is on metoprolol succinate 25 mg once a day\par \par Venous insufficiency, bilateral leg edema:\par Advised to take furosemide 20 mg 1 tablet consecutively for 3 days then continue every other day.\par Advised compressive stocking elevation of feet, walking\par \par Hypothyroidism:\par She is currently not on medication.  Last thyroid levels were within normal range.\par \par Hyperlipidemia:\par Controlled with diet currently not on medication.\par \par Bladder cancer;\par Seen urology recently had a cystoscopy done which showed bladder wall grade 3 trabeculation and diverticula.  The right anterior papillary tumor 5 mm, overlying right ureteric orifice 1 cm papillary tumor.\par Surgical option was discussed with patient.  There is a risk of right ureteric obstruction.  I reviewed note of urology.\par Also discussed with family about the finding.

## 2022-11-03 NOTE — HISTORY OF PRESENT ILLNESS
[FreeTextEntry8] : Ms. SHAW is here today with her daughter in law , she presents today for low back pain in the left side for last 4 days and increase frequency of urination.\par pt. speak Urdu , translation was done by the DIL.\par Over all she is stable and well.\par She is getting weaker , still works in the yard .\par Appetite is ok. \par c/o swelling of both LE form venous insufficiency.\par seen urology and had cystoscopy.\par \par \par

## 2022-12-20 NOTE — PATIENT PROFILE ADULT - FALL HARM RISK - CONCLUSION
Detail Level: Generalized Instructions: This plan will send the code FBSE to the PM system.  DO NOT or CHANGE the price. Price (Do Not Change): 0.00 Fall with Harm Risk

## 2023-01-01 ENCOUNTER — LABORATORY RESULT (OUTPATIENT)
Age: 88
End: 2023-01-01

## 2023-01-01 ENCOUNTER — TRANSCRIPTION ENCOUNTER (OUTPATIENT)
Age: 88
End: 2023-01-01

## 2023-01-01 ENCOUNTER — APPOINTMENT (OUTPATIENT)
Dept: INTERNAL MEDICINE | Facility: CLINIC | Age: 88
End: 2023-01-01
Payer: MEDICARE

## 2023-01-01 ENCOUNTER — APPOINTMENT (OUTPATIENT)
Dept: UROLOGY | Facility: CLINIC | Age: 88
End: 2023-01-01
Payer: COMMERCIAL

## 2023-01-01 ENCOUNTER — APPOINTMENT (OUTPATIENT)
Dept: AFTER HOURS CARE | Facility: EMERGENCY ROOM | Age: 88
End: 2023-01-01
Payer: COMMERCIAL

## 2023-01-01 ENCOUNTER — NON-APPOINTMENT (OUTPATIENT)
Age: 88
End: 2023-01-01

## 2023-01-01 ENCOUNTER — APPOINTMENT (OUTPATIENT)
Dept: SURGERY | Facility: CLINIC | Age: 88
End: 2023-01-01
Payer: MEDICARE

## 2023-01-01 ENCOUNTER — APPOINTMENT (OUTPATIENT)
Dept: ULTRASOUND IMAGING | Facility: CLINIC | Age: 88
End: 2023-01-01
Payer: COMMERCIAL

## 2023-01-01 ENCOUNTER — OUTPATIENT (OUTPATIENT)
Dept: OUTPATIENT SERVICES | Facility: HOSPITAL | Age: 88
LOS: 1 days | End: 2023-01-01

## 2023-01-01 ENCOUNTER — APPOINTMENT (OUTPATIENT)
Dept: UROLOGY | Facility: CLINIC | Age: 88
End: 2023-01-01

## 2023-01-01 ENCOUNTER — OUTPATIENT (OUTPATIENT)
Dept: OUTPATIENT SERVICES | Facility: HOSPITAL | Age: 88
LOS: 1 days | End: 2023-01-01
Payer: COMMERCIAL

## 2023-01-01 ENCOUNTER — APPOINTMENT (OUTPATIENT)
Dept: INTERNAL MEDICINE | Facility: CLINIC | Age: 88
End: 2023-01-01

## 2023-01-01 ENCOUNTER — APPOINTMENT (OUTPATIENT)
Dept: ULTRASOUND IMAGING | Facility: CLINIC | Age: 88
End: 2023-01-01
Payer: MEDICARE

## 2023-01-01 ENCOUNTER — RX RENEWAL (OUTPATIENT)
Age: 88
End: 2023-01-01

## 2023-01-01 ENCOUNTER — FORM ENCOUNTER (OUTPATIENT)
Age: 88
End: 2023-01-01

## 2023-01-01 ENCOUNTER — APPOINTMENT (OUTPATIENT)
Dept: INTERNAL MEDICINE | Facility: CLINIC | Age: 88
End: 2023-01-01
Payer: COMMERCIAL

## 2023-01-01 ENCOUNTER — INPATIENT (INPATIENT)
Facility: HOSPITAL | Age: 88
LOS: 5 days | Discharge: NURSING, REHAB OR LONG-TERM CARE | DRG: 312 | End: 2023-11-16
Attending: INTERNAL MEDICINE | Admitting: INTERNAL MEDICINE
Payer: MEDICARE

## 2023-01-01 VITALS
HEART RATE: 70 BPM | SYSTOLIC BLOOD PRESSURE: 140 MMHG | DIASTOLIC BLOOD PRESSURE: 78 MMHG | BODY MASS INDEX: 24.8 KG/M2 | OXYGEN SATURATION: 98 % | HEIGHT: 67 IN | RESPIRATION RATE: 15 BRPM | WEIGHT: 158 LBS | TEMPERATURE: 97.7 F

## 2023-01-01 VITALS
SYSTOLIC BLOOD PRESSURE: 120 MMHG | HEART RATE: 95 BPM | BODY MASS INDEX: 22.13 KG/M2 | DIASTOLIC BLOOD PRESSURE: 72 MMHG | TEMPERATURE: 97.4 F | HEIGHT: 67 IN | WEIGHT: 141 LBS | RESPIRATION RATE: 16 BRPM | OXYGEN SATURATION: 99 %

## 2023-01-01 VITALS
OXYGEN SATURATION: 95 % | DIASTOLIC BLOOD PRESSURE: 65 MMHG | HEIGHT: 68 IN | RESPIRATION RATE: 18 BRPM | SYSTOLIC BLOOD PRESSURE: 120 MMHG | TEMPERATURE: 98 F | WEIGHT: 145.06 LBS | HEART RATE: 89 BPM

## 2023-01-01 VITALS
DIASTOLIC BLOOD PRESSURE: 72 MMHG | HEIGHT: 67 IN | TEMPERATURE: 97.6 F | WEIGHT: 148 LBS | BODY MASS INDEX: 23.23 KG/M2 | SYSTOLIC BLOOD PRESSURE: 138 MMHG

## 2023-01-01 VITALS
WEIGHT: 156 LBS | BODY MASS INDEX: 24.48 KG/M2 | SYSTOLIC BLOOD PRESSURE: 153 MMHG | HEIGHT: 67 IN | DIASTOLIC BLOOD PRESSURE: 83 MMHG

## 2023-01-01 VITALS
RESPIRATION RATE: 74 BRPM | HEIGHT: 67 IN | TEMPERATURE: 97.7 F | HEART RATE: 75 BPM | SYSTOLIC BLOOD PRESSURE: 125 MMHG | OXYGEN SATURATION: 98 % | DIASTOLIC BLOOD PRESSURE: 66 MMHG | WEIGHT: 138 LBS | BODY MASS INDEX: 21.66 KG/M2

## 2023-01-01 VITALS
HEIGHT: 67 IN | RESPIRATION RATE: 15 BRPM | BODY MASS INDEX: 24.48 KG/M2 | SYSTOLIC BLOOD PRESSURE: 130 MMHG | TEMPERATURE: 98.1 F | OXYGEN SATURATION: 97 % | HEART RATE: 60 BPM | WEIGHT: 156 LBS | DIASTOLIC BLOOD PRESSURE: 62 MMHG

## 2023-01-01 VITALS
WEIGHT: 156 LBS | SYSTOLIC BLOOD PRESSURE: 114 MMHG | HEIGHT: 67 IN | DIASTOLIC BLOOD PRESSURE: 73 MMHG | OXYGEN SATURATION: 98 % | TEMPERATURE: 97.6 F | HEART RATE: 90 BPM | BODY MASS INDEX: 24.48 KG/M2

## 2023-01-01 VITALS
RESPIRATION RATE: 18 BRPM | TEMPERATURE: 98 F | HEART RATE: 83 BPM | DIASTOLIC BLOOD PRESSURE: 70 MMHG | SYSTOLIC BLOOD PRESSURE: 121 MMHG | OXYGEN SATURATION: 97 %

## 2023-01-01 DIAGNOSIS — E03.9 HYPOTHYROIDISM, UNSPECIFIED: ICD-10-CM

## 2023-01-01 DIAGNOSIS — B35.1 TINEA UNGUIUM: ICD-10-CM

## 2023-01-01 DIAGNOSIS — C67.9 MALIGNANT NEOPLASM OF BLADDER, UNSPECIFIED: ICD-10-CM

## 2023-01-01 DIAGNOSIS — R79.89 OTHER SPECIFIED ABNORMAL FINDINGS OF BLOOD CHEMISTRY: ICD-10-CM
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
## 2023-01-01 DIAGNOSIS — R60.0 LOCALIZED EDEMA: ICD-10-CM

## 2023-01-01 DIAGNOSIS — H35.30 UNSPECIFIED MACULAR DEGENERATION: ICD-10-CM

## 2023-01-01 DIAGNOSIS — R41.89 OTHER SYMPTOMS AND SIGNS INVOLVING COGNITIVE FUNCTIONS AND AWARENESS: ICD-10-CM

## 2023-01-01 DIAGNOSIS — E87.6 HYPOKALEMIA: ICD-10-CM
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
## 2023-01-01 DIAGNOSIS — Z13.39 ENCOUNTER FOR SCREENING EXAM FOR OTHER MENTAL HEALTH AND BEHAVIORAL DISORDERS: ICD-10-CM

## 2023-01-01 DIAGNOSIS — Z13.31 ENCOUNTER FOR SCREENING FOR DEPRESSION: ICD-10-CM

## 2023-01-01 DIAGNOSIS — Z00.00 ENCOUNTER FOR GENERAL ADULT MEDICAL EXAMINATION W/OUT ABNORMAL FINDINGS: ICD-10-CM

## 2023-01-01 DIAGNOSIS — E86.0 DEHYDRATION: ICD-10-CM

## 2023-01-01 DIAGNOSIS — F41.8 OTHER SPECIFIED ANXIETY DISORDERS: ICD-10-CM

## 2023-01-01 DIAGNOSIS — F09 UNSPECIFIED MENTAL DISORDER DUE TO KNOWN PHYSIOLOGICAL CONDITION: ICD-10-CM

## 2023-01-01 DIAGNOSIS — I10 ESSENTIAL (PRIMARY) HYPERTENSION: ICD-10-CM

## 2023-01-01 DIAGNOSIS — R55 SYNCOPE AND COLLAPSE: ICD-10-CM

## 2023-01-01 DIAGNOSIS — E78.5 HYPERLIPIDEMIA, UNSPECIFIED: ICD-10-CM

## 2023-01-01 DIAGNOSIS — R46.89 OTHER SYMPTOMS AND SIGNS INVOLVING COGNITIVE FUNCTIONS AND AWARENESS: ICD-10-CM

## 2023-01-01 DIAGNOSIS — I87.2 VENOUS INSUFFICIENCY (CHRONIC) (PERIPHERAL): ICD-10-CM

## 2023-01-01 LAB
ADD ON TEST-SPECIMEN IN LAB: SIGNIFICANT CHANGE UP
ADD ON TEST-SPECIMEN IN LAB: SIGNIFICANT CHANGE UP
ALBUMIN SERPL ELPH-MCNC: 3.2 G/DL — LOW (ref 3.3–5)
ALBUMIN SERPL ELPH-MCNC: 3.2 G/DL — LOW (ref 3.3–5)
ALBUMIN SERPL ELPH-MCNC: 4.2 G/DL
ALBUMIN SERPL ELPH-MCNC: 4.6 G/DL
ALP BLD-CCNC: 75 U/L
ALP SERPL-CCNC: 72 U/L — SIGNIFICANT CHANGE UP (ref 40–120)
ALP SERPL-CCNC: 72 U/L — SIGNIFICANT CHANGE UP (ref 40–120)
ALT FLD-CCNC: 26 U/L — SIGNIFICANT CHANGE UP (ref 12–78)
ALT FLD-CCNC: 26 U/L — SIGNIFICANT CHANGE UP (ref 12–78)
ALT SERPL-CCNC: 20 U/L
ANION GAP SERPL CALC-SCNC: 10 MMOL/L
ANION GAP SERPL CALC-SCNC: 10 MMOL/L
ANION GAP SERPL CALC-SCNC: 14 MMOL/L
ANION GAP SERPL CALC-SCNC: 18 MMOL/L
ANION GAP SERPL CALC-SCNC: 3 MMOL/L — LOW (ref 5–17)
ANION GAP SERPL CALC-SCNC: 3 MMOL/L — LOW (ref 5–17)
ANION GAP SERPL CALC-SCNC: 4 MMOL/L — LOW (ref 5–17)
ANION GAP SERPL CALC-SCNC: 4 MMOL/L — LOW (ref 5–17)
ANION GAP SERPL CALC-SCNC: 5 MMOL/L — SIGNIFICANT CHANGE UP (ref 5–17)
ANION GAP SERPL CALC-SCNC: 5 MMOL/L — SIGNIFICANT CHANGE UP (ref 5–17)
ANION GAP SERPL CALC-SCNC: 6 MMOL/L — SIGNIFICANT CHANGE UP (ref 5–17)
ANION GAP SERPL CALC-SCNC: 6 MMOL/L — SIGNIFICANT CHANGE UP (ref 5–17)
ANION GAP SERPL CALC-SCNC: 7 MMOL/L — SIGNIFICANT CHANGE UP (ref 5–17)
ANION GAP SERPL CALC-SCNC: 7 MMOL/L — SIGNIFICANT CHANGE UP (ref 5–17)
APPEARANCE UR: CLEAR — SIGNIFICANT CHANGE UP
APPEARANCE: CLEAR
APPEARANCE: CLEAR
AST SERPL-CCNC: 34 U/L
AST SERPL-CCNC: 34 U/L — SIGNIFICANT CHANGE UP (ref 15–37)
AST SERPL-CCNC: 34 U/L — SIGNIFICANT CHANGE UP (ref 15–37)
BACTERIA # UR AUTO: NEGATIVE /HPF — SIGNIFICANT CHANGE UP
BACTERIA UR CULT: NORMAL
BACTERIA UR CULT: NORMAL
BACTERIA: NEGATIVE
BACTERIA: NEGATIVE /HPF
BASOPHILS # BLD AUTO: 0.05 K/UL — SIGNIFICANT CHANGE UP (ref 0–0.2)
BASOPHILS # BLD AUTO: 0.05 K/UL — SIGNIFICANT CHANGE UP (ref 0–0.2)
BASOPHILS # BLD AUTO: 0.11 K/UL — SIGNIFICANT CHANGE UP (ref 0–0.2)
BASOPHILS # BLD AUTO: 0.11 K/UL — SIGNIFICANT CHANGE UP (ref 0–0.2)
BASOPHILS NFR BLD AUTO: 0.7 % — SIGNIFICANT CHANGE UP (ref 0–2)
BASOPHILS NFR BLD AUTO: 0.7 % — SIGNIFICANT CHANGE UP (ref 0–2)
BASOPHILS NFR BLD AUTO: 1 % — SIGNIFICANT CHANGE UP (ref 0–2)
BASOPHILS NFR BLD AUTO: 1 % — SIGNIFICANT CHANGE UP (ref 0–2)
BILIRUB SERPL-MCNC: 0.5 MG/DL — SIGNIFICANT CHANGE UP (ref 0.2–1.2)
BILIRUB SERPL-MCNC: 0.5 MG/DL — SIGNIFICANT CHANGE UP (ref 0.2–1.2)
BILIRUB SERPL-MCNC: 0.6 MG/DL
BILIRUB UR-MCNC: NEGATIVE — SIGNIFICANT CHANGE UP
BILIRUBIN URINE: NEGATIVE
BILIRUBIN URINE: NEGATIVE
BLOOD URINE: NEGATIVE
BLOOD URINE: NEGATIVE
BUN SERPL-MCNC: 10 MG/DL — SIGNIFICANT CHANGE UP (ref 7–23)
BUN SERPL-MCNC: 10 MG/DL — SIGNIFICANT CHANGE UP (ref 7–23)
BUN SERPL-MCNC: 15 MG/DL — SIGNIFICANT CHANGE UP (ref 7–23)
BUN SERPL-MCNC: 18 MG/DL — SIGNIFICANT CHANGE UP (ref 7–23)
BUN SERPL-MCNC: 18 MG/DL — SIGNIFICANT CHANGE UP (ref 7–23)
BUN SERPL-MCNC: 21 MG/DL
BUN SERPL-MCNC: 24 MG/DL — HIGH (ref 7–23)
BUN SERPL-MCNC: 24 MG/DL — HIGH (ref 7–23)
BUN SERPL-MCNC: 26 MG/DL
BUN SERPL-MCNC: 27 MG/DL
BUN SERPL-MCNC: 36 MG/DL
CALCIUM SERPL-MCNC: 10.2 MG/DL
CALCIUM SERPL-MCNC: 8.3 MG/DL — LOW (ref 8.5–10.1)
CALCIUM SERPL-MCNC: 8.3 MG/DL — LOW (ref 8.5–10.1)
CALCIUM SERPL-MCNC: 8.8 MG/DL — SIGNIFICANT CHANGE UP (ref 8.5–10.1)
CALCIUM SERPL-MCNC: 8.8 MG/DL — SIGNIFICANT CHANGE UP (ref 8.5–10.1)
CALCIUM SERPL-MCNC: 9 MG/DL — SIGNIFICANT CHANGE UP (ref 8.5–10.1)
CALCIUM SERPL-MCNC: 9.3 MG/DL — SIGNIFICANT CHANGE UP (ref 8.5–10.1)
CALCIUM SERPL-MCNC: 9.3 MG/DL — SIGNIFICANT CHANGE UP (ref 8.5–10.1)
CALCIUM SERPL-MCNC: 9.5 MG/DL
CALCIUM SERPL-MCNC: 9.8 MG/DL
CALCIUM SERPL-MCNC: 9.9 MG/DL
CAST: 0 /LPF
CAST: 0 /LPF — SIGNIFICANT CHANGE UP (ref 0–4)
CAST: 0 /LPF — SIGNIFICANT CHANGE UP (ref 0–4)
CAST: 2 /LPF — SIGNIFICANT CHANGE UP (ref 0–4)
CAST: 2 /LPF — SIGNIFICANT CHANGE UP (ref 0–4)
CHLORIDE SERPL-SCNC: 100 MMOL/L
CHLORIDE SERPL-SCNC: 107 MMOL/L
CHLORIDE SERPL-SCNC: 107 MMOL/L
CHLORIDE SERPL-SCNC: 107 MMOL/L — SIGNIFICANT CHANGE UP (ref 96–108)
CHLORIDE SERPL-SCNC: 107 MMOL/L — SIGNIFICANT CHANGE UP (ref 96–108)
CHLORIDE SERPL-SCNC: 108 MMOL/L — SIGNIFICANT CHANGE UP (ref 96–108)
CHLORIDE SERPL-SCNC: 108 MMOL/L — SIGNIFICANT CHANGE UP (ref 96–108)
CHLORIDE SERPL-SCNC: 110 MMOL/L — HIGH (ref 96–108)
CHLORIDE SERPL-SCNC: 112 MMOL/L — HIGH (ref 96–108)
CHLORIDE SERPL-SCNC: 112 MMOL/L — HIGH (ref 96–108)
CHLORIDE SERPL-SCNC: 95 MMOL/L
CHOLEST SERPL-MCNC: 252 MG/DL
CO2 SERPL-SCNC: 26 MMOL/L
CO2 SERPL-SCNC: 26 MMOL/L
CO2 SERPL-SCNC: 26 MMOL/L — SIGNIFICANT CHANGE UP (ref 22–31)
CO2 SERPL-SCNC: 30 MMOL/L
CO2 SERPL-SCNC: 30 MMOL/L
CO2 SERPL-SCNC: 31 MMOL/L — SIGNIFICANT CHANGE UP (ref 22–31)
CO2 SERPL-SCNC: 31 MMOL/L — SIGNIFICANT CHANGE UP (ref 22–31)
CO2 SERPL-SCNC: 32 MMOL/L — HIGH (ref 22–31)
CO2 SERPL-SCNC: 32 MMOL/L — HIGH (ref 22–31)
COLOR SPEC: YELLOW — SIGNIFICANT CHANGE UP
COLOR: COLORLESS
COLOR: YELLOW
CREAT SERPL-MCNC: 0.49 MG/DL — LOW (ref 0.5–1.3)
CREAT SERPL-MCNC: 0.49 MG/DL — LOW (ref 0.5–1.3)
CREAT SERPL-MCNC: 0.56 MG/DL — SIGNIFICANT CHANGE UP (ref 0.5–1.3)
CREAT SERPL-MCNC: 0.56 MG/DL — SIGNIFICANT CHANGE UP (ref 0.5–1.3)
CREAT SERPL-MCNC: 0.57 MG/DL — SIGNIFICANT CHANGE UP (ref 0.5–1.3)
CREAT SERPL-MCNC: 0.57 MG/DL — SIGNIFICANT CHANGE UP (ref 0.5–1.3)
CREAT SERPL-MCNC: 0.64 MG/DL
CREAT SERPL-MCNC: 0.68 MG/DL — SIGNIFICANT CHANGE UP (ref 0.5–1.3)
CREAT SERPL-MCNC: 0.68 MG/DL — SIGNIFICANT CHANGE UP (ref 0.5–1.3)
CREAT SERPL-MCNC: 0.69 MG/DL
CREAT SERPL-MCNC: 0.7 MG/DL — SIGNIFICANT CHANGE UP (ref 0.5–1.3)
CREAT SERPL-MCNC: 0.7 MG/DL — SIGNIFICANT CHANGE UP (ref 0.5–1.3)
CREAT SERPL-MCNC: 0.88 MG/DL
CREAT SERPL-MCNC: 1.12 MG/DL
CULTURE RESULTS: SIGNIFICANT CHANGE UP
DIFF PNL FLD: ABNORMAL
EGFR: 46 ML/MIN/1.73M2
EGFR: 62 ML/MIN/1.73M2
EGFR: 81 ML/MIN/1.73M2 — SIGNIFICANT CHANGE UP
EGFR: 81 ML/MIN/1.73M2 — SIGNIFICANT CHANGE UP
EGFR: 82 ML/MIN/1.73M2
EGFR: 82 ML/MIN/1.73M2 — SIGNIFICANT CHANGE UP
EGFR: 82 ML/MIN/1.73M2 — SIGNIFICANT CHANGE UP
EGFR: 83 ML/MIN/1.73M2
EGFR: 85 ML/MIN/1.73M2 — SIGNIFICANT CHANGE UP
EGFR: 85 ML/MIN/1.73M2 — SIGNIFICANT CHANGE UP
EGFR: 86 ML/MIN/1.73M2 — SIGNIFICANT CHANGE UP
EGFR: 86 ML/MIN/1.73M2 — SIGNIFICANT CHANGE UP
EGFR: 88 ML/MIN/1.73M2 — SIGNIFICANT CHANGE UP
EGFR: 88 ML/MIN/1.73M2 — SIGNIFICANT CHANGE UP
EOSINOPHIL # BLD AUTO: 0 K/UL — SIGNIFICANT CHANGE UP (ref 0–0.5)
EOSINOPHIL # BLD AUTO: 0 K/UL — SIGNIFICANT CHANGE UP (ref 0–0.5)
EOSINOPHIL # BLD AUTO: 0.18 K/UL — SIGNIFICANT CHANGE UP (ref 0–0.5)
EOSINOPHIL # BLD AUTO: 0.18 K/UL — SIGNIFICANT CHANGE UP (ref 0–0.5)
EOSINOPHIL NFR BLD AUTO: 0 % — SIGNIFICANT CHANGE UP (ref 0–6)
EOSINOPHIL NFR BLD AUTO: 0 % — SIGNIFICANT CHANGE UP (ref 0–6)
EOSINOPHIL NFR BLD AUTO: 2.5 % — SIGNIFICANT CHANGE UP (ref 0–6)
EOSINOPHIL NFR BLD AUTO: 2.5 % — SIGNIFICANT CHANGE UP (ref 0–6)
EPITHELIAL CELLS: 0 /HPF
GLUCOSE QUALITATIVE U: NEGATIVE
GLUCOSE QUALITATIVE U: NEGATIVE MG/DL
GLUCOSE SERPL-MCNC: 112 MG/DL — HIGH (ref 70–99)
GLUCOSE SERPL-MCNC: 112 MG/DL — HIGH (ref 70–99)
GLUCOSE SERPL-MCNC: 128 MG/DL — HIGH (ref 70–99)
GLUCOSE SERPL-MCNC: 128 MG/DL — HIGH (ref 70–99)
GLUCOSE SERPL-MCNC: 75 MG/DL
GLUCOSE SERPL-MCNC: 83 MG/DL
GLUCOSE SERPL-MCNC: 85 MG/DL
GLUCOSE SERPL-MCNC: 88 MG/DL
GLUCOSE SERPL-MCNC: 91 MG/DL — SIGNIFICANT CHANGE UP (ref 70–99)
GLUCOSE SERPL-MCNC: 94 MG/DL — SIGNIFICANT CHANGE UP (ref 70–99)
GLUCOSE SERPL-MCNC: 94 MG/DL — SIGNIFICANT CHANGE UP (ref 70–99)
GLUCOSE UR QL: NEGATIVE MG/DL — SIGNIFICANT CHANGE UP
HCT VFR BLD CALC: 33.4 % — LOW (ref 34.5–45)
HCT VFR BLD CALC: 33.4 % — LOW (ref 34.5–45)
HCT VFR BLD CALC: 33.6 % — LOW (ref 34.5–45)
HCT VFR BLD CALC: 33.6 % — LOW (ref 34.5–45)
HCT VFR BLD CALC: 34.3 % — LOW (ref 34.5–45)
HCT VFR BLD CALC: 34.3 % — LOW (ref 34.5–45)
HCT VFR BLD CALC: 37.7 % — SIGNIFICANT CHANGE UP (ref 34.5–45)
HDLC SERPL-MCNC: 64 MG/DL
HGB BLD-MCNC: 10.9 G/DL — LOW (ref 11.5–15.5)
HGB BLD-MCNC: 10.9 G/DL — LOW (ref 11.5–15.5)
HGB BLD-MCNC: 11 G/DL — LOW (ref 11.5–15.5)
HGB BLD-MCNC: 11 G/DL — LOW (ref 11.5–15.5)
HGB BLD-MCNC: 11.2 G/DL — LOW (ref 11.5–15.5)
HGB BLD-MCNC: 11.2 G/DL — LOW (ref 11.5–15.5)
HGB BLD-MCNC: 12.5 G/DL — SIGNIFICANT CHANGE UP (ref 11.5–15.5)
HYALINE CASTS: 0 /LPF
IMM GRANULOCYTES NFR BLD AUTO: 0.6 % — SIGNIFICANT CHANGE UP (ref 0–0.9)
IMM GRANULOCYTES NFR BLD AUTO: 0.6 % — SIGNIFICANT CHANGE UP (ref 0–0.9)
KETONES UR-MCNC: 15 MG/DL
KETONES UR-MCNC: 15 MG/DL
KETONES UR-MCNC: NEGATIVE MG/DL — SIGNIFICANT CHANGE UP
KETONES UR-MCNC: NEGATIVE MG/DL — SIGNIFICANT CHANGE UP
KETONES URINE: NEGATIVE
KETONES URINE: NEGATIVE MG/DL
LACTATE SERPL-SCNC: 1.4 MMOL/L — SIGNIFICANT CHANGE UP (ref 0.7–2)
LACTATE SERPL-SCNC: 1.4 MMOL/L — SIGNIFICANT CHANGE UP (ref 0.7–2)
LDLC SERPL CALC-MCNC: 157 MG/DL
LEUKOCYTE ESTERASE UR-ACNC: NEGATIVE — SIGNIFICANT CHANGE UP
LEUKOCYTE ESTERASE URINE: NEGATIVE
LEUKOCYTE ESTERASE URINE: NEGATIVE
LYMPHOCYTES # BLD AUTO: 0.44 K/UL — LOW (ref 1–3.3)
LYMPHOCYTES # BLD AUTO: 0.44 K/UL — LOW (ref 1–3.3)
LYMPHOCYTES # BLD AUTO: 1.3 K/UL — SIGNIFICANT CHANGE UP (ref 1–3.3)
LYMPHOCYTES # BLD AUTO: 1.3 K/UL — SIGNIFICANT CHANGE UP (ref 1–3.3)
LYMPHOCYTES # BLD AUTO: 18.2 % — SIGNIFICANT CHANGE UP (ref 13–44)
LYMPHOCYTES # BLD AUTO: 18.2 % — SIGNIFICANT CHANGE UP (ref 13–44)
LYMPHOCYTES # BLD AUTO: 4 % — LOW (ref 13–44)
LYMPHOCYTES # BLD AUTO: 4 % — LOW (ref 13–44)
MAGNESIUM SERPL-MCNC: 2.2 MG/DL — SIGNIFICANT CHANGE UP (ref 1.6–2.6)
MAGNESIUM SERPL-MCNC: 2.2 MG/DL — SIGNIFICANT CHANGE UP (ref 1.6–2.6)
MANUAL SMEAR VERIFICATION: SIGNIFICANT CHANGE UP
MANUAL SMEAR VERIFICATION: SIGNIFICANT CHANGE UP
MCHC RBC-ENTMCNC: 29.7 PG — SIGNIFICANT CHANGE UP (ref 27–34)
MCHC RBC-ENTMCNC: 29.7 PG — SIGNIFICANT CHANGE UP (ref 27–34)
MCHC RBC-ENTMCNC: 29.8 PG — SIGNIFICANT CHANGE UP (ref 27–34)
MCHC RBC-ENTMCNC: 29.8 PG — SIGNIFICANT CHANGE UP (ref 27–34)
MCHC RBC-ENTMCNC: 29.9 PG — SIGNIFICANT CHANGE UP (ref 27–34)
MCHC RBC-ENTMCNC: 30.5 PG — SIGNIFICANT CHANGE UP (ref 27–34)
MCHC RBC-ENTMCNC: 30.5 PG — SIGNIFICANT CHANGE UP (ref 27–34)
MCHC RBC-ENTMCNC: 32.1 GM/DL — SIGNIFICANT CHANGE UP (ref 32–36)
MCHC RBC-ENTMCNC: 32.1 GM/DL — SIGNIFICANT CHANGE UP (ref 32–36)
MCHC RBC-ENTMCNC: 32.4 GM/DL — SIGNIFICANT CHANGE UP (ref 32–36)
MCHC RBC-ENTMCNC: 32.4 GM/DL — SIGNIFICANT CHANGE UP (ref 32–36)
MCHC RBC-ENTMCNC: 33.2 GM/DL — SIGNIFICANT CHANGE UP (ref 32–36)
MCHC RBC-ENTMCNC: 33.5 GM/DL — SIGNIFICANT CHANGE UP (ref 32–36)
MCHC RBC-ENTMCNC: 33.5 GM/DL — SIGNIFICANT CHANGE UP (ref 32–36)
MCV RBC AUTO: 89.8 FL — SIGNIFICANT CHANGE UP (ref 80–100)
MCV RBC AUTO: 89.8 FL — SIGNIFICANT CHANGE UP (ref 80–100)
MCV RBC AUTO: 90.2 FL — SIGNIFICANT CHANGE UP (ref 80–100)
MCV RBC AUTO: 90.2 FL — SIGNIFICANT CHANGE UP (ref 80–100)
MCV RBC AUTO: 91 FL — SIGNIFICANT CHANGE UP (ref 80–100)
MCV RBC AUTO: 91 FL — SIGNIFICANT CHANGE UP (ref 80–100)
MCV RBC AUTO: 92.3 FL — SIGNIFICANT CHANGE UP (ref 80–100)
MCV RBC AUTO: 92.3 FL — SIGNIFICANT CHANGE UP (ref 80–100)
MCV RBC AUTO: 92.7 FL — SIGNIFICANT CHANGE UP (ref 80–100)
MCV RBC AUTO: 92.7 FL — SIGNIFICANT CHANGE UP (ref 80–100)
MICROSCOPIC-UA: NORMAL
MICROSCOPIC-UA: NORMAL
MONOCYTES # BLD AUTO: 0.91 K/UL — HIGH (ref 0–0.9)
MONOCYTES # BLD AUTO: 0.91 K/UL — HIGH (ref 0–0.9)
MONOCYTES # BLD AUTO: 1.1 K/UL — HIGH (ref 0–0.9)
MONOCYTES # BLD AUTO: 1.1 K/UL — HIGH (ref 0–0.9)
MONOCYTES NFR BLD AUTO: 10 % — SIGNIFICANT CHANGE UP (ref 2–14)
MONOCYTES NFR BLD AUTO: 10 % — SIGNIFICANT CHANGE UP (ref 2–14)
MONOCYTES NFR BLD AUTO: 12.7 % — SIGNIFICANT CHANGE UP (ref 2–14)
MONOCYTES NFR BLD AUTO: 12.7 % — SIGNIFICANT CHANGE UP (ref 2–14)
NEUTROPHILS # BLD AUTO: 4.68 K/UL — SIGNIFICANT CHANGE UP (ref 1.8–7.4)
NEUTROPHILS # BLD AUTO: 4.68 K/UL — SIGNIFICANT CHANGE UP (ref 1.8–7.4)
NEUTROPHILS # BLD AUTO: 9.33 K/UL — HIGH (ref 1.8–7.4)
NEUTROPHILS # BLD AUTO: 9.33 K/UL — HIGH (ref 1.8–7.4)
NEUTROPHILS NFR BLD AUTO: 65.3 % — SIGNIFICANT CHANGE UP (ref 43–77)
NEUTROPHILS NFR BLD AUTO: 65.3 % — SIGNIFICANT CHANGE UP (ref 43–77)
NEUTROPHILS NFR BLD AUTO: 85 % — HIGH (ref 43–77)
NEUTROPHILS NFR BLD AUTO: 85 % — HIGH (ref 43–77)
NITRITE UR-MCNC: NEGATIVE — SIGNIFICANT CHANGE UP
NITRITE URINE: NEGATIVE
NITRITE URINE: NEGATIVE
NONHDLC SERPL-MCNC: 188 MG/DL
NRBC # BLD: 0 /100 — SIGNIFICANT CHANGE UP (ref 0–0)
NRBC # BLD: 0 /100 — SIGNIFICANT CHANGE UP (ref 0–0)
NRBC # BLD: SIGNIFICANT CHANGE UP /100 WBCS (ref 0–0)
NRBC # BLD: SIGNIFICANT CHANGE UP /100 WBCS (ref 0–0)
PH UR: 5.5 — SIGNIFICANT CHANGE UP (ref 5–8)
PH UR: 5.5 — SIGNIFICANT CHANGE UP (ref 5–8)
PH UR: 7.5 — SIGNIFICANT CHANGE UP (ref 5–8)
PH UR: 7.5 — SIGNIFICANT CHANGE UP (ref 5–8)
PH URINE: 6
PH URINE: 6.5
PHOSPHATE SERPL-MCNC: 3.3 MG/DL
PLAT MORPH BLD: NORMAL — SIGNIFICANT CHANGE UP
PLAT MORPH BLD: NORMAL — SIGNIFICANT CHANGE UP
PLATELET # BLD AUTO: 242 K/UL — SIGNIFICANT CHANGE UP (ref 150–400)
PLATELET # BLD AUTO: 242 K/UL — SIGNIFICANT CHANGE UP (ref 150–400)
PLATELET # BLD AUTO: 247 K/UL — SIGNIFICANT CHANGE UP (ref 150–400)
PLATELET # BLD AUTO: 247 K/UL — SIGNIFICANT CHANGE UP (ref 150–400)
PLATELET # BLD AUTO: 269 K/UL — SIGNIFICANT CHANGE UP (ref 150–400)
PLATELET # BLD AUTO: 269 K/UL — SIGNIFICANT CHANGE UP (ref 150–400)
PLATELET # BLD AUTO: 312 K/UL — SIGNIFICANT CHANGE UP (ref 150–400)
PLATELET # BLD AUTO: 312 K/UL — SIGNIFICANT CHANGE UP (ref 150–400)
PLATELET # BLD AUTO: 331 K/UL — SIGNIFICANT CHANGE UP (ref 150–400)
PLATELET # BLD AUTO: 331 K/UL — SIGNIFICANT CHANGE UP (ref 150–400)
POTASSIUM SERPL-MCNC: 3.3 MMOL/L — LOW (ref 3.5–5.3)
POTASSIUM SERPL-MCNC: 3.3 MMOL/L — LOW (ref 3.5–5.3)
POTASSIUM SERPL-MCNC: 3.4 MMOL/L — LOW (ref 3.5–5.3)
POTASSIUM SERPL-MCNC: 3.4 MMOL/L — LOW (ref 3.5–5.3)
POTASSIUM SERPL-MCNC: 4.1 MMOL/L — SIGNIFICANT CHANGE UP (ref 3.5–5.3)
POTASSIUM SERPL-MCNC: 4.2 MMOL/L — SIGNIFICANT CHANGE UP (ref 3.5–5.3)
POTASSIUM SERPL-MCNC: 4.2 MMOL/L — SIGNIFICANT CHANGE UP (ref 3.5–5.3)
POTASSIUM SERPL-SCNC: 3 MMOL/L
POTASSIUM SERPL-SCNC: 3.3 MMOL/L — LOW (ref 3.5–5.3)
POTASSIUM SERPL-SCNC: 3.3 MMOL/L — LOW (ref 3.5–5.3)
POTASSIUM SERPL-SCNC: 3.4 MMOL/L — LOW (ref 3.5–5.3)
POTASSIUM SERPL-SCNC: 3.4 MMOL/L — LOW (ref 3.5–5.3)
POTASSIUM SERPL-SCNC: 4.1 MMOL/L — SIGNIFICANT CHANGE UP (ref 3.5–5.3)
POTASSIUM SERPL-SCNC: 4.2 MMOL/L — SIGNIFICANT CHANGE UP (ref 3.5–5.3)
POTASSIUM SERPL-SCNC: 4.2 MMOL/L — SIGNIFICANT CHANGE UP (ref 3.5–5.3)
POTASSIUM SERPL-SCNC: 4.3 MMOL/L
POTASSIUM SERPL-SCNC: 4.8 MMOL/L
POTASSIUM SERPL-SCNC: 4.9 MMOL/L
PROT SERPL-MCNC: 6.9 GM/DL — SIGNIFICANT CHANGE UP (ref 6–8.3)
PROT SERPL-MCNC: 6.9 GM/DL — SIGNIFICANT CHANGE UP (ref 6–8.3)
PROT SERPL-MCNC: 7.4 G/DL
PROT UR-MCNC: NEGATIVE MG/DL — SIGNIFICANT CHANGE UP
PROTEIN URINE: NEGATIVE
PROTEIN URINE: NEGATIVE MG/DL
RAPID RVP RESULT: SIGNIFICANT CHANGE UP
RBC # BLD: 3.64 M/UL — LOW (ref 3.8–5.2)
RBC # BLD: 3.64 M/UL — LOW (ref 3.8–5.2)
RBC # BLD: 3.67 M/UL — LOW (ref 3.8–5.2)
RBC # BLD: 3.67 M/UL — LOW (ref 3.8–5.2)
RBC # BLD: 3.7 M/UL — LOW (ref 3.8–5.2)
RBC # BLD: 3.7 M/UL — LOW (ref 3.8–5.2)
RBC # BLD: 4.18 M/UL — SIGNIFICANT CHANGE UP (ref 3.8–5.2)
RBC # BLD: 4.18 M/UL — SIGNIFICANT CHANGE UP (ref 3.8–5.2)
RBC # BLD: 4.2 M/UL — SIGNIFICANT CHANGE UP (ref 3.8–5.2)
RBC # BLD: 4.2 M/UL — SIGNIFICANT CHANGE UP (ref 3.8–5.2)
RBC # FLD: 13.3 % — SIGNIFICANT CHANGE UP (ref 10.3–14.5)
RBC # FLD: 13.8 % — SIGNIFICANT CHANGE UP (ref 10.3–14.5)
RBC # FLD: 13.8 % — SIGNIFICANT CHANGE UP (ref 10.3–14.5)
RBC # FLD: 13.9 % — SIGNIFICANT CHANGE UP (ref 10.3–14.5)
RBC # FLD: 13.9 % — SIGNIFICANT CHANGE UP (ref 10.3–14.5)
RBC # FLD: 14.3 % — SIGNIFICANT CHANGE UP (ref 10.3–14.5)
RBC # FLD: 14.3 % — SIGNIFICANT CHANGE UP (ref 10.3–14.5)
RBC BLD AUTO: NORMAL — SIGNIFICANT CHANGE UP
RBC BLD AUTO: NORMAL — SIGNIFICANT CHANGE UP
RBC CASTS # UR COMP ASSIST: 1 /HPF — SIGNIFICANT CHANGE UP (ref 0–4)
RBC CASTS # UR COMP ASSIST: 1 /HPF — SIGNIFICANT CHANGE UP (ref 0–4)
RBC CASTS # UR COMP ASSIST: 3 /HPF — SIGNIFICANT CHANGE UP (ref 0–4)
RBC CASTS # UR COMP ASSIST: 3 /HPF — SIGNIFICANT CHANGE UP (ref 0–4)
RED BLOOD CELLS URINE: 0 /HPF
RED BLOOD CELLS URINE: 2 /HPF
SARS-COV-2 RNA SPEC QL NAA+PROBE: SIGNIFICANT CHANGE UP
SODIUM SERPL-SCNC: 141 MMOL/L — SIGNIFICANT CHANGE UP (ref 135–145)
SODIUM SERPL-SCNC: 141 MMOL/L — SIGNIFICANT CHANGE UP (ref 135–145)
SODIUM SERPL-SCNC: 142 MMOL/L — SIGNIFICANT CHANGE UP (ref 135–145)
SODIUM SERPL-SCNC: 143 MMOL/L
SODIUM SERPL-SCNC: 143 MMOL/L — SIGNIFICANT CHANGE UP (ref 135–145)
SODIUM SERPL-SCNC: 143 MMOL/L — SIGNIFICANT CHANGE UP (ref 135–145)
SODIUM SERPL-SCNC: 144 MMOL/L
SODIUM SERPL-SCNC: 145 MMOL/L — SIGNIFICANT CHANGE UP (ref 135–145)
SODIUM SERPL-SCNC: 145 MMOL/L — SIGNIFICANT CHANGE UP (ref 135–145)
SP GR SPEC: 1.02 — SIGNIFICANT CHANGE UP (ref 1–1.03)
SP GR SPEC: 1.02 — SIGNIFICANT CHANGE UP (ref 1–1.03)
SP GR SPEC: 1.03 — SIGNIFICANT CHANGE UP (ref 1–1.03)
SP GR SPEC: 1.03 — SIGNIFICANT CHANGE UP (ref 1–1.03)
SPECIFIC GRAVITY URINE: 1.01
SPECIFIC GRAVITY URINE: 1.02
SPECIMEN SOURCE: SIGNIFICANT CHANGE UP
SQUAMOUS # UR AUTO: 0 /HPF — SIGNIFICANT CHANGE UP (ref 0–5)
SQUAMOUS EPITHELIAL CELLS: 0 /HPF
TRIGL SERPL-MCNC: 155 MG/DL
TROPONIN I, HIGH SENSITIVITY RESULT: 40.36 NG/L — SIGNIFICANT CHANGE UP
TROPONIN I, HIGH SENSITIVITY RESULT: 40.36 NG/L — SIGNIFICANT CHANGE UP
TROPONIN I, HIGH SENSITIVITY RESULT: 60.59 NG/L — HIGH
TROPONIN I, HIGH SENSITIVITY RESULT: 60.59 NG/L — HIGH
TROPONIN I, HIGH SENSITIVITY RESULT: 74.15 NG/L — HIGH
TROPONIN I, HIGH SENSITIVITY RESULT: 74.15 NG/L — HIGH
TROPONIN I, HIGH SENSITIVITY RESULT: 86.28 NG/L — HIGH
TROPONIN I, HIGH SENSITIVITY RESULT: 86.28 NG/L — HIGH
TSH SERPL-ACNC: 4.74 UIU/ML
TSH SERPL-ACNC: 6.18 UIU/ML
TSH SERPL-MCNC: 3.79 UU/ML — SIGNIFICANT CHANGE UP (ref 0.34–4.82)
TSH SERPL-MCNC: 3.79 UU/ML — SIGNIFICANT CHANGE UP (ref 0.34–4.82)
URINE CYTOLOGY: NORMAL
URINE CYTOLOGY: NORMAL
UROBILINOGEN FLD QL: 1 MG/DL — SIGNIFICANT CHANGE UP (ref 0.2–1)
UROBILINOGEN URINE: 0.2 MG/DL
UROBILINOGEN URINE: NORMAL
WBC # BLD: 10.98 K/UL — HIGH (ref 3.8–10.5)
WBC # BLD: 10.98 K/UL — HIGH (ref 3.8–10.5)
WBC # BLD: 6.76 K/UL — SIGNIFICANT CHANGE UP (ref 3.8–10.5)
WBC # BLD: 6.76 K/UL — SIGNIFICANT CHANGE UP (ref 3.8–10.5)
WBC # BLD: 7.16 K/UL — SIGNIFICANT CHANGE UP (ref 3.8–10.5)
WBC # BLD: 7.16 K/UL — SIGNIFICANT CHANGE UP (ref 3.8–10.5)
WBC # BLD: 7.95 K/UL — SIGNIFICANT CHANGE UP (ref 3.8–10.5)
WBC # BLD: 7.95 K/UL — SIGNIFICANT CHANGE UP (ref 3.8–10.5)
WBC # BLD: 8.05 K/UL — SIGNIFICANT CHANGE UP (ref 3.8–10.5)
WBC # BLD: 8.05 K/UL — SIGNIFICANT CHANGE UP (ref 3.8–10.5)
WBC # FLD AUTO: 10.98 K/UL — HIGH (ref 3.8–10.5)
WBC # FLD AUTO: 10.98 K/UL — HIGH (ref 3.8–10.5)
WBC # FLD AUTO: 6.76 K/UL — SIGNIFICANT CHANGE UP (ref 3.8–10.5)
WBC # FLD AUTO: 6.76 K/UL — SIGNIFICANT CHANGE UP (ref 3.8–10.5)
WBC # FLD AUTO: 7.16 K/UL — SIGNIFICANT CHANGE UP (ref 3.8–10.5)
WBC # FLD AUTO: 7.16 K/UL — SIGNIFICANT CHANGE UP (ref 3.8–10.5)
WBC # FLD AUTO: 7.95 K/UL — SIGNIFICANT CHANGE UP (ref 3.8–10.5)
WBC # FLD AUTO: 7.95 K/UL — SIGNIFICANT CHANGE UP (ref 3.8–10.5)
WBC # FLD AUTO: 8.05 K/UL — SIGNIFICANT CHANGE UP (ref 3.8–10.5)
WBC # FLD AUTO: 8.05 K/UL — SIGNIFICANT CHANGE UP (ref 3.8–10.5)
WBC UR QL: 0 /HPF — SIGNIFICANT CHANGE UP (ref 0–5)
WHITE BLOOD CELLS URINE: 0 /HPF
WHITE BLOOD CELLS URINE: 2 /HPF

## 2023-01-01 PROCEDURE — 93000 ELECTROCARDIOGRAM COMPLETE: CPT | Mod: 59

## 2023-01-01 PROCEDURE — 85025 COMPLETE CBC W/AUTO DIFF WBC: CPT

## 2023-01-01 PROCEDURE — 87081 CULTURE SCREEN ONLY: CPT

## 2023-01-01 PROCEDURE — 99285 EMERGENCY DEPT VISIT HI MDM: CPT

## 2023-01-01 PROCEDURE — 99233 SBSQ HOSP IP/OBS HIGH 50: CPT

## 2023-01-01 PROCEDURE — 85027 COMPLETE CBC AUTOMATED: CPT

## 2023-01-01 PROCEDURE — 71045 X-RAY EXAM CHEST 1 VIEW: CPT

## 2023-01-01 PROCEDURE — 74177 CT ABD & PELVIS W/CONTRAST: CPT | Mod: 26,MA

## 2023-01-01 PROCEDURE — 52000 CYSTOURETHROSCOPY: CPT

## 2023-01-01 PROCEDURE — 99214 OFFICE O/P EST MOD 30 MIN: CPT | Mod: 25

## 2023-01-01 PROCEDURE — 83605 ASSAY OF LACTIC ACID: CPT

## 2023-01-01 PROCEDURE — 99223 1ST HOSP IP/OBS HIGH 75: CPT

## 2023-01-01 PROCEDURE — 71260 CT THORAX DX C+: CPT | Mod: 26,MA

## 2023-01-01 PROCEDURE — 99239 HOSP IP/OBS DSCHRG MGMT >30: CPT

## 2023-01-01 PROCEDURE — 93010 ELECTROCARDIOGRAM REPORT: CPT

## 2023-01-01 PROCEDURE — 97116 GAIT TRAINING THERAPY: CPT | Mod: GP

## 2023-01-01 PROCEDURE — 93306 TTE W/DOPPLER COMPLETE: CPT | Mod: 26

## 2023-01-01 PROCEDURE — 36415 COLL VENOUS BLD VENIPUNCTURE: CPT

## 2023-01-01 PROCEDURE — 0225U NFCT DS DNA&RNA 21 SARSCOV2: CPT

## 2023-01-01 PROCEDURE — 93005 ELECTROCARDIOGRAM TRACING: CPT

## 2023-01-01 PROCEDURE — 84484 ASSAY OF TROPONIN QUANT: CPT

## 2023-01-01 PROCEDURE — 87880 STREP A ASSAY W/OPTIC: CPT

## 2023-01-01 PROCEDURE — 93970 EXTREMITY STUDY: CPT | Mod: 26

## 2023-01-01 PROCEDURE — 73080 X-RAY EXAM OF ELBOW: CPT | Mod: 26,RT

## 2023-01-01 PROCEDURE — 83735 ASSAY OF MAGNESIUM: CPT

## 2023-01-01 PROCEDURE — 93306 TTE W/DOPPLER COMPLETE: CPT

## 2023-01-01 PROCEDURE — 99204 OFFICE O/P NEW MOD 45 MIN: CPT

## 2023-01-01 PROCEDURE — 97163 PT EVAL HIGH COMPLEX 45 MIN: CPT | Mod: GP

## 2023-01-01 PROCEDURE — 99214 OFFICE O/P EST MOD 30 MIN: CPT

## 2023-01-01 PROCEDURE — 99232 SBSQ HOSP IP/OBS MODERATE 35: CPT

## 2023-01-01 PROCEDURE — G0444 DEPRESSION SCREEN ANNUAL: CPT

## 2023-01-01 PROCEDURE — 76770 US EXAM ABDO BACK WALL COMP: CPT

## 2023-01-01 PROCEDURE — 52224Z: CUSTOM

## 2023-01-01 PROCEDURE — 70486 CT MAXILLOFACIAL W/O DYE: CPT | Mod: 26,MA

## 2023-01-01 PROCEDURE — 87040 BLOOD CULTURE FOR BACTERIA: CPT

## 2023-01-01 PROCEDURE — 97530 THERAPEUTIC ACTIVITIES: CPT | Mod: GP

## 2023-01-01 PROCEDURE — 72125 CT NECK SPINE W/O DYE: CPT | Mod: 26,MA

## 2023-01-01 PROCEDURE — 99204 OFFICE O/P NEW MOD 45 MIN: CPT | Mod: 95

## 2023-01-01 PROCEDURE — 76376 3D RENDER W/INTRP POSTPROCES: CPT | Mod: 26

## 2023-01-01 PROCEDURE — 84443 ASSAY THYROID STIM HORMONE: CPT

## 2023-01-01 PROCEDURE — 70450 CT HEAD/BRAIN W/O DYE: CPT | Mod: 26,MA

## 2023-01-01 PROCEDURE — G0439: CPT

## 2023-01-01 PROCEDURE — 76770 US EXAM ABDO BACK WALL COMP: CPT | Mod: 26

## 2023-01-01 PROCEDURE — 71045 X-RAY EXAM CHEST 1 VIEW: CPT | Mod: 26

## 2023-01-01 PROCEDURE — G0442 ANNUAL ALCOHOL SCREEN 15 MIN: CPT

## 2023-01-01 PROCEDURE — 80048 BASIC METABOLIC PNL TOTAL CA: CPT

## 2023-01-01 PROCEDURE — 81001 URINALYSIS AUTO W/SCOPE: CPT

## 2023-01-01 RX ORDER — LEVOTHYROXINE SODIUM 0.03 MG/1
25 TABLET ORAL
Qty: 90 | Refills: 0 | Status: ACTIVE | COMMUNITY
Start: 2021-12-21 | End: 1900-01-01

## 2023-01-01 RX ORDER — LEVOTHYROXINE SODIUM 125 MCG
1 TABLET ORAL
Qty: 0 | Refills: 0 | DISCHARGE

## 2023-01-01 RX ORDER — FERROUS SULFATE 325(65) MG
1 TABLET ORAL
Qty: 0 | Refills: 0 | DISCHARGE

## 2023-01-01 RX ORDER — ACETAMINOPHEN 500 MG
650 TABLET ORAL EVERY 6 HOURS
Refills: 0 | Status: DISCONTINUED | OUTPATIENT
Start: 2023-01-01 | End: 2023-01-01

## 2023-01-01 RX ORDER — SODIUM CHLORIDE 9 MG/ML
1000 INJECTION INTRAMUSCULAR; INTRAVENOUS; SUBCUTANEOUS ONCE
Refills: 0 | Status: COMPLETED | OUTPATIENT
Start: 2023-01-01 | End: 2023-01-01

## 2023-01-01 RX ORDER — SODIUM CHLORIDE 0.65 %
1 AEROSOL, SPRAY (ML) NASAL
Refills: 0 | Status: DISCONTINUED | OUTPATIENT
Start: 2023-01-01 | End: 2023-01-01

## 2023-01-01 RX ORDER — ATORVASTATIN CALCIUM 10 MG/1
10 TABLET, FILM COATED ORAL
Qty: 90 | Refills: 1 | Status: ACTIVE | COMMUNITY
Start: 2023-01-01 | End: 1900-01-01

## 2023-01-01 RX ORDER — PHENAZOPYRIDINE HYDROCHLORIDE 100 MG/1
100 TABLET ORAL 3 TIMES DAILY
Qty: 9 | Refills: 0 | Status: ACTIVE | COMMUNITY
Start: 2023-01-01 | End: 1900-01-01

## 2023-01-01 RX ORDER — FUROSEMIDE 40 MG
20 TABLET ORAL
Refills: 0 | Status: DISCONTINUED | OUTPATIENT
Start: 2023-01-01 | End: 2023-01-01

## 2023-01-01 RX ORDER — SERTRALINE 25 MG/1
25 TABLET, FILM COATED ORAL
Qty: 30 | Refills: 0 | Status: ACTIVE | COMMUNITY
Start: 2023-01-01 | End: 1900-01-01

## 2023-01-01 RX ORDER — ASPIRIN/CALCIUM CARB/MAGNESIUM 324 MG
81 TABLET ORAL DAILY
Refills: 0 | Status: DISCONTINUED | OUTPATIENT
Start: 2023-01-01 | End: 2023-01-01

## 2023-01-01 RX ORDER — CEFUROXIME AXETIL 500 MG/1
500 TABLET ORAL
Qty: 14 | Refills: 0 | Status: ACTIVE | COMMUNITY
Start: 2023-01-01 | End: 1900-01-01

## 2023-01-01 RX ORDER — CEFUROXIME AXETIL 500 MG/1
500 TABLET ORAL
Qty: 6 | Refills: 0 | Status: ACTIVE | COMMUNITY
Start: 2023-01-01 | End: 1900-01-01

## 2023-01-01 RX ORDER — METOLAZONE 2.5 MG/1
2.5 TABLET ORAL DAILY
Qty: 30 | Refills: 0 | Status: ACTIVE | COMMUNITY
Start: 2023-01-01 | End: 1900-01-01

## 2023-01-01 RX ORDER — POTASSIUM CHLORIDE 1500 MG/1
20 TABLET, FILM COATED, EXTENDED RELEASE ORAL
Qty: 5 | Refills: 0 | Status: DISCONTINUED | COMMUNITY
Start: 2023-01-01 | End: 2023-01-01

## 2023-01-01 RX ORDER — KETOCONAZOLE 20 MG/G
1 AEROSOL, FOAM TOPICAL
Refills: 0 | DISCHARGE

## 2023-01-01 RX ORDER — METOLAZONE 5 MG/1
1 TABLET ORAL
Refills: 0 | DISCHARGE

## 2023-01-01 RX ORDER — FUROSEMIDE 20 MG/1
20 TABLET ORAL
Qty: 15 | Refills: 0 | Status: ACTIVE | COMMUNITY
Start: 2022-07-06 | End: 1900-01-01

## 2023-01-01 RX ORDER — BENZOCAINE AND MENTHOL 5; 1 G/100ML; G/100ML
1 LIQUID ORAL EVERY 4 HOURS
Refills: 0 | Status: DISCONTINUED | OUTPATIENT
Start: 2023-01-01 | End: 2023-01-01

## 2023-01-01 RX ORDER — METOPROLOL TARTRATE 50 MG
0.5 TABLET ORAL
Qty: 0 | Refills: 0 | DISCHARGE

## 2023-01-01 RX ORDER — POTASSIUM CHLORIDE 20 MEQ/15ML
20 MEQ/15ML SOLUTION ORAL DAILY
Qty: 100 | Refills: 0 | Status: ACTIVE | COMMUNITY
Start: 2023-01-01 | End: 1900-01-01

## 2023-01-01 RX ORDER — ENOXAPARIN SODIUM 100 MG/ML
40 INJECTION SUBCUTANEOUS EVERY 24 HOURS
Refills: 0 | Status: DISCONTINUED | OUTPATIENT
Start: 2023-01-01 | End: 2023-01-01

## 2023-01-01 RX ORDER — CHLORHEXIDINE GLUCONATE 4 %
325 (65 FE) LIQUID (ML) TOPICAL DAILY
Refills: 0 | Status: DISCONTINUED | COMMUNITY
Start: 2022-01-21 | End: 2023-01-01

## 2023-01-01 RX ORDER — FUROSEMIDE 40 MG
1 TABLET ORAL
Refills: 0 | DISCHARGE

## 2023-01-01 RX ORDER — ACETAMINOPHEN 500 MG
2 TABLET ORAL
Qty: 0 | Refills: 0 | DISCHARGE
Start: 2023-01-01

## 2023-01-01 RX ORDER — POTASSIUM CHLORIDE 20 MEQ
40 PACKET (EA) ORAL ONCE
Refills: 0 | Status: COMPLETED | OUTPATIENT
Start: 2023-01-01 | End: 2023-01-01

## 2023-01-01 RX ORDER — ACETAMINOPHEN 500 MG
1000 TABLET ORAL EVERY 8 HOURS
Refills: 0 | Status: DISCONTINUED | OUTPATIENT
Start: 2023-01-01 | End: 2023-01-01

## 2023-01-01 RX ORDER — SODIUM CHLORIDE 9 MG/ML
1000 INJECTION INTRAMUSCULAR; INTRAVENOUS; SUBCUTANEOUS
Refills: 0 | Status: DISCONTINUED | OUTPATIENT
Start: 2023-01-01 | End: 2023-01-01

## 2023-01-01 RX ORDER — LEVOTHYROXINE SODIUM 125 MCG
25 TABLET ORAL DAILY
Refills: 0 | Status: DISCONTINUED | OUTPATIENT
Start: 2023-01-01 | End: 2023-01-01

## 2023-01-01 RX ORDER — PHENOL/SODIUM PHENOLATE
2 AEROSOL, SPRAY (ML) MUCOUS MEMBRANE EVERY 4 HOURS
Refills: 0 | Status: DISCONTINUED | OUTPATIENT
Start: 2023-01-01 | End: 2023-01-01

## 2023-01-01 RX ORDER — LIDOCAINE 4 G/100G
1 CREAM TOPICAL
Qty: 0 | Refills: 0 | DISCHARGE
Start: 2023-01-01

## 2023-01-01 RX ORDER — METOPROLOL SUCCINATE 25 MG/1
25 TABLET, EXTENDED RELEASE ORAL DAILY
Qty: 45 | Refills: 0 | Status: DISCONTINUED | COMMUNITY
Start: 2022-07-06 | End: 2023-01-01

## 2023-01-01 RX ORDER — PHENOL/SODIUM PHENOLATE
2 AEROSOL, SPRAY (ML) MUCOUS MEMBRANE
Qty: 0 | Refills: 0 | DISCHARGE
Start: 2023-01-01

## 2023-01-01 RX ORDER — LIDOCAINE 4 G/100G
1 CREAM TOPICAL DAILY
Refills: 0 | Status: DISCONTINUED | OUTPATIENT
Start: 2023-01-01 | End: 2023-01-01

## 2023-01-01 RX ORDER — SODIUM CHLORIDE 0.65 %
2 AEROSOL, SPRAY (ML) NASAL
Qty: 0 | Refills: 0 | DISCHARGE
Start: 2023-01-01

## 2023-01-01 RX ADMIN — SODIUM CHLORIDE 75 MILLILITER(S): 9 INJECTION INTRAMUSCULAR; INTRAVENOUS; SUBCUTANEOUS at 09:27

## 2023-01-01 RX ADMIN — SODIUM CHLORIDE 1000 MILLILITER(S): 9 INJECTION INTRAMUSCULAR; INTRAVENOUS; SUBCUTANEOUS at 13:41

## 2023-01-01 RX ADMIN — Medication 2 SPRAY(S): at 17:47

## 2023-01-01 RX ADMIN — LIDOCAINE 1 PATCH: 4 CREAM TOPICAL at 02:00

## 2023-01-01 RX ADMIN — Medication 1000 MILLIGRAM(S): at 15:28

## 2023-01-01 RX ADMIN — Medication 20 MILLIGRAM(S): at 18:11

## 2023-01-01 RX ADMIN — Medication 25 MICROGRAM(S): at 06:39

## 2023-01-01 RX ADMIN — Medication 25 MICROGRAM(S): at 05:35

## 2023-01-01 RX ADMIN — Medication 1000 MILLIGRAM(S): at 22:30

## 2023-01-01 RX ADMIN — Medication 81 MILLIGRAM(S): at 09:26

## 2023-01-01 RX ADMIN — ENOXAPARIN SODIUM 40 MILLIGRAM(S): 100 INJECTION SUBCUTANEOUS at 06:22

## 2023-01-01 RX ADMIN — Medication 81 MILLIGRAM(S): at 09:33

## 2023-01-01 RX ADMIN — Medication 1000 MILLIGRAM(S): at 23:30

## 2023-01-01 RX ADMIN — Medication 25 MICROGRAM(S): at 06:42

## 2023-01-01 RX ADMIN — Medication 1000 MILLIGRAM(S): at 06:21

## 2023-01-01 RX ADMIN — LIDOCAINE 1 PATCH: 4 CREAM TOPICAL at 23:00

## 2023-01-01 RX ADMIN — SODIUM CHLORIDE 75 MILLILITER(S): 9 INJECTION INTRAMUSCULAR; INTRAVENOUS; SUBCUTANEOUS at 15:36

## 2023-01-01 RX ADMIN — Medication 81 MILLIGRAM(S): at 12:36

## 2023-01-01 RX ADMIN — Medication 81 MILLIGRAM(S): at 14:28

## 2023-01-01 RX ADMIN — LIDOCAINE 1 PATCH: 4 CREAM TOPICAL at 09:33

## 2023-01-01 RX ADMIN — Medication 2 SPRAY(S): at 06:42

## 2023-01-01 RX ADMIN — LIDOCAINE 1 PATCH: 4 CREAM TOPICAL at 12:36

## 2023-01-01 RX ADMIN — Medication 650 MILLIGRAM(S): at 19:04

## 2023-01-01 RX ADMIN — Medication 1 SPRAY(S): at 06:22

## 2023-01-01 RX ADMIN — LIDOCAINE 1 PATCH: 4 CREAM TOPICAL at 14:29

## 2023-01-01 RX ADMIN — LIDOCAINE 1 PATCH: 4 CREAM TOPICAL at 19:28

## 2023-01-01 RX ADMIN — Medication 650 MILLIGRAM(S): at 09:29

## 2023-01-01 RX ADMIN — LIDOCAINE 1 PATCH: 4 CREAM TOPICAL at 09:45

## 2023-01-01 RX ADMIN — ENOXAPARIN SODIUM 40 MILLIGRAM(S): 100 INJECTION SUBCUTANEOUS at 06:39

## 2023-01-01 RX ADMIN — Medication 2 SPRAY(S): at 20:55

## 2023-01-01 RX ADMIN — Medication 2 SPRAY(S): at 15:36

## 2023-01-01 RX ADMIN — ENOXAPARIN SODIUM 40 MILLIGRAM(S): 100 INJECTION SUBCUTANEOUS at 06:17

## 2023-01-01 RX ADMIN — ENOXAPARIN SODIUM 40 MILLIGRAM(S): 100 INJECTION SUBCUTANEOUS at 12:36

## 2023-01-01 RX ADMIN — Medication 2 SPRAY(S): at 06:22

## 2023-01-01 RX ADMIN — Medication 1000 MILLIGRAM(S): at 21:36

## 2023-01-01 RX ADMIN — LIDOCAINE 1 PATCH: 4 CREAM TOPICAL at 21:39

## 2023-01-01 RX ADMIN — Medication 25 MICROGRAM(S): at 06:21

## 2023-01-01 RX ADMIN — Medication 2 SPRAY(S): at 21:37

## 2023-01-01 RX ADMIN — Medication 1000 MILLIGRAM(S): at 22:00

## 2023-01-01 RX ADMIN — LIDOCAINE 1 PATCH: 4 CREAM TOPICAL at 09:26

## 2023-01-01 RX ADMIN — Medication 20 MILLIGRAM(S): at 12:36

## 2023-01-01 RX ADMIN — ENOXAPARIN SODIUM 40 MILLIGRAM(S): 100 INJECTION SUBCUTANEOUS at 06:42

## 2023-01-01 RX ADMIN — Medication 650 MILLIGRAM(S): at 09:45

## 2023-01-01 RX ADMIN — Medication 81 MILLIGRAM(S): at 09:44

## 2023-01-01 RX ADMIN — ENOXAPARIN SODIUM 40 MILLIGRAM(S): 100 INJECTION SUBCUTANEOUS at 05:36

## 2023-01-01 RX ADMIN — Medication 1000 MILLIGRAM(S): at 14:28

## 2023-01-01 RX ADMIN — Medication 2 SPRAY(S): at 13:33

## 2023-01-01 RX ADMIN — LIDOCAINE 1 PATCH: 4 CREAM TOPICAL at 11:20

## 2023-01-01 RX ADMIN — Medication 2 SPRAY(S): at 09:26

## 2023-01-01 RX ADMIN — Medication 40 MILLIEQUIVALENT(S): at 15:36

## 2023-01-01 RX ADMIN — LIDOCAINE 1 PATCH: 4 CREAM TOPICAL at 20:21

## 2023-02-23 PROBLEM — Z13.39 ALCOHOL SCREENING: Status: ACTIVE | Noted: 2023-01-01

## 2023-02-23 PROBLEM — Z00.00 ANNUAL PHYSICAL EXAM: Status: ACTIVE | Noted: 2023-01-01

## 2023-02-23 PROBLEM — Z13.31 DEPRESSION SCREENING: Status: ACTIVE | Noted: 2023-01-01

## 2023-02-23 NOTE — HISTORY OF PRESENT ILLNESS
[FreeTextEntry1] : annual wellness exam. [de-identified] : Ms. ARAVIND SHAW  is    89 year female . pt. do not speak english , She is here with her son and DIL, history got from them.\par She have venous insufficiency both legs have swelling and edema.  Not compliant with compressive stockings.\par No change in her appetite.\par Family stated that she has been acting a little guerrero.\par Hypothyroidism on levothyroxine 25 mcg.\par Hypertension she is on metoprolol succinate 25 mg she had been complaining of dizziness intermittently.\par History of bladder cancer recently had cystoscopy and fulguration of bladder tumors.  Denied any abdominal pain.\par \par

## 2023-02-23 NOTE — ASSESSMENT
[FreeTextEntry1] : Annual:\par reviewed labs from urology.\par labs drawn in the office.\par will review the results and address if abnormal.\par Deferred   flu vaccine\par Declined  PPSV  , shingles vaccine.\par Encouraged   COVID vaccine\par alcohol screening :SIBRT:0\par Depression screening:PHQ2:0\par EKG: NSR , no ST-T wave changes.\par \par Hyperlipidemia:\par Not on meds\par Ordered lipid profile\par \par Hypothyroidism:\par On levothyroxine 25 mcg\par Order TSH\par \par Bladder tumor:\par Status post surgery.\par had cystoscopy and fulguration of bladder tumors.\par sees Dr Klein.\par \par venous insufficiency , b/l leg edema:\par prescribed metolazone 2.5\par \par f/u in one month.\par \par

## 2023-02-23 NOTE — HEALTH RISK ASSESSMENT
[Fair] : ~his/her~ current health as fair  [Good] : ~his/her~  mood as  good [No] : No [No falls in past year] : Patient reported no falls in the past year [0] : 2) Feeling down, depressed, or hopeless: Not at all (0) [PHQ-2 Negative - No further assessment needed] : PHQ-2 Negative - No further assessment needed [HIV test declined] : HIV test declined [Hepatitis C test declined] : Hepatitis C test declined [Behavior] : difficulty with behavior [With Family] : lives with family [Independent] : feeding [Some assistance needed] : managing medications [Full assistance needed] : managing finances [Never] : Never [de-identified] : Walks inside the house without any support [[id number]] : 0 [Change in mental status noted] : No change in mental status noted [Reports changes in hearing] : Reports no changes in hearing [Reports changes in dental health] : Reports no changes in dental health

## 2023-02-23 NOTE — PHYSICAL EXAM
[Normal] : soft, non-tender, non-distended, no masses palpated, no HSM and normal bowel sounds [de-identified] : Both lower extremities swollen, tight skin looks red grade 1 pitting edema.  Peripheral pulses palpable. [de-identified] : Fungus infection in toenails. [de-identified] : Slow gait walks without any support

## 2023-03-29 PROBLEM — E78.5 HYPERLIPIDEMIA: Status: ACTIVE | Noted: 2021-12-21

## 2023-03-29 NOTE — HISTORY OF PRESENT ILLNESS
[FreeTextEntry1] : Follow-up on hypertension, swelling of both legs. [de-identified] : Ms. SHAW is here today with her son and daughter-in-law.  Patient cannot speak English history got from family member.\par Last visit I had prescribed her metolazone along with furosemide to take as needed for swelling of both legs.\par Daughter-in-law stated when she takes both the medication her swelling/swelling goes down considerably but for last 1 week she have only taken 1 or 2 days of Lasix and the swelling have came back.\par Her blood pressure medicine metoprolol succinate 25 mg once on hold because she was complaining of dizziness.  Her symptoms are much better after stopping medication .\par Hyperlipidemia patient is refusing to take medication.

## 2023-03-29 NOTE — ASSESSMENT
[FreeTextEntry1] : Hyperlipidemia:\par Recent LDL is elevated.  I had prescribed atorvastatin 10 mg.  Family stated patient is refusing to take medication.\par \par Hypothyroidism:\par Currently euthyroid on levothyroxine 25 mcg\par \par Bladder tumor:\par Status post surgery.\par had cystoscopy and fulguration of bladder tumors.\par sees Dr Klein.\par \par venous insufficiency , b/l leg edema:\par Advised family to give metolazone and furosemide 2 days in a week and only for some mild alternate days.  Compressive stocking recommended.  Referred to vascular surgery.\par \par Hypertension: Metoprolol succinate 25 mg was on hold as patient was complaining of dizziness.  Blood pressure is on goal today.  Symptoms of dizziness is much improved.\par Advised to stop metoprolol and monitor blood pressure at home.\par \par f/u in 3 month.\par \par

## 2023-03-29 NOTE — REVIEW OF SYSTEMS
[Negative] : Gastrointestinal [Chest Pain] : no chest pain [Leg Claudication] : no leg claudication [Orthopnea] : no orthopnea [FreeTextEntry5] : As HPI

## 2023-03-29 NOTE — PHYSICAL EXAM
[Normal] : normal rate, regular rhythm, normal S1 and S2 and no murmur heard [de-identified] : Swelling of both legs

## 2023-04-06 NOTE — PLAN
[FreeTextEntry1] : Try new support hose - easier to apply. Duplex. F/U post tests.\par Total time > 47 minutes

## 2023-04-06 NOTE — HISTORY OF PRESENT ILLNESS
[de-identified] : 89 yo female with bilat LE edema. Had VV procedures over 15 years ago. Spotty compliance with support hose. On 2 diuretics.

## 2023-04-06 NOTE — PHYSICAL EXAM
[JVD] : no jugular venous distention  [Normal Breath Sounds] : Normal breath sounds [2+] : left 2+ [No Rash or Lesion] : No rash or lesion [Alert] : alert [Oriented to Person] : oriented to person [Oriented to Place] : oriented to place [Oriented to Time] : oriented to time [Calm] : calm [de-identified] : NAD [de-identified] : NC/AT PER [de-identified] : soft, NL BS [de-identified] : no CVA tenderness [de-identified] : mod edema bilat, no ulcers

## 2023-04-06 NOTE — CONSULT LETTER
[Dear  ___] : Dear  [unfilled], [Consult Letter:] : I had the pleasure of evaluating your patient, [unfilled]. [Please see my note below.] : Please see my note below. [Consult Closing:] : Thank you very much for allowing me to participate in the care of this patient.  If you have any questions, please do not hesitate to contact me. [Sincerely,] : Sincerely, [FreeTextEntry3] : Wm Benitez GUIDO

## 2023-04-06 NOTE — REVIEW OF SYSTEMS
[Fever] : no fever [Chills] : no chills [Eye Pain] : no eye pain [Nosebleeds] : no nosebleeds [Chest Pain] : no chest pain [Shortness Of Breath] : no shortness of breath [Abdominal Pain] : no abdominal pain [Limb Swelling] : limb swelling [Convulsions] : no convulsions [Easy Bleeding] : no tendency for easy bleeding [Easy Bruising] : no tendency for easy bruising

## 2023-04-19 PROBLEM — C67.9 BLADDER CANCER: Status: ACTIVE | Noted: 2022-01-14

## 2023-05-01 NOTE — ASSESSMENT
[FreeTextEntry1] : Reviewed records.\par Discussed labs and imaging. \par \par Bladder cancer:\par Discussed Cystoscopy findings. \par Discussed treatment options. Will continue to observe. \par Surveillance Cystoscopy in 6 months. \par Will get Renal and Bladder Ultrasound. \par Obtained urine specimen at the time of Cystoscopy: will get Urinalysis, Urine culture and Urine cytology. \par Continue Cefuroxime.\par Will inform results. \par \par Return to office in 6 months or sooner if any issues: will do Cystoscopy possible fulguration.  \par \par

## 2023-05-01 NOTE — HISTORY OF PRESENT ILLNESS
[FreeTextEntry1] : Patient primarily Polish speaking, with limited English. Visit conducted with help of accompanying Son who was translating. \par \par 89 year old female presents for Cystoscopy. \par Has urinary incontinence, wears diaper. \par Denies dysuria, hematuria, lower abdominal or flank pain, nausea, vomiting, fever, chills or rigors. \par \par Status post Cystoscopy and fulguration of Bladder tumor on 1/13/23 in the office. \par \par Status post Transurethral Resection of Bladder Tumor with Intravesical Mitomycin Instillation on 1/10/22 at [facility]. 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 \par \par \par Seen on 12/21/21 for gross hematuria and bladder tumor. \par Was admitted at [facility] for not feeling well, had gross hematuria and on CT scan: Bladder tumor. Required blood transfusion. Was seen by Dr Fonseca in the Hospital. \par Saw Dr Flower after discharge for continued gross hematuria. \par

## 2023-06-15 NOTE — PLAN
[FreeTextEntry1] : --Closely monitor LE edema for continued improvement\par --Local dressings for discharge and encouraged better compliance with support hosiery.\par --Patient is on a complex furosemide and metolazone regimen but has not been compliant with metolazone for the last few weeks.  Recommended not adjusting this regimen at this time and instead strongly encouraged patient be compliant with medications as prescribed \par --Frequent LE elevation\par --Very strict ED precautions discussed\par --Close f/u c PCP within the next 24-48 hours for reevaluation

## 2023-06-15 NOTE — ASSESSMENT
[FreeTextEntry1] : LE swelling/venous stasis bilaterally.  No obvious e/o cellulitis especially given improving symptoms after elevation.  No concern for nec fasc or compartment syndrome.  Very unlikely DVT given recent negative duplex.  Discussed with daughter in law at length that while cellulitis is unlikely, it is not possible to entirely rule this out, so after shared decision making conversation c r/b/a discussion elected to hold abx at this time and closely monitor for continued improvement of symptoms.  No indicaiton for ED evaluation at this time

## 2023-06-15 NOTE — HISTORY OF PRESENT ILLNESS
[Home] : at home, [unfilled] , at the time of the visit. [Other Location: e.g. Home (Enter Location, City,State)___] : at [unfilled] [Verbal consent obtained from patient] : the patient, [unfilled] [FreeTextEntry8] : BIANCA Deng accompanying and translating Greenlandic at patient's request\par 89 y F h/o b/l LE edema, HTN with recurrence of known LE edema over the last week, now with small amounts of discharge to legs bilaterally that started yesterday.  Has seen a vascular surgeon recently for this and had a negative b/l LE venous duplex less than a month ago.\par Patient is intermittently compliant with compressive stockings\par Patient has not been elevating her legs in the last few days.  Daughter in law states patient is always moving and stands for long periods of time while gardening.\par Meds -- furosimide and metolazone but patient is noncompliant with metolazone due to polyuria/incontinence. \par No other swelling.  No sob/cough.  No f/c.  No renal disfunction on most recent AEHR labs\par Since patient started elevating legs today her swelling and erythema have improved.\par \par Exam -- nontoxic, well appearing, +b/l LE swelling, moderate, a/w mild erythema diffusely to upper calves.  Small  amounts of serous fluid scattered anterior L>R calf.  No purulence.  Erythema is not significant.  No stated TTP.  No odor reported.  Swelling is grossly symmetric.  B/L LE reported compressible.  \par  [FreeTextEntry3] : daughter in law Sowmya

## 2023-06-27 PROBLEM — F41.8 ANXIETY ABOUT HEALTH: Status: ACTIVE | Noted: 2023-01-01

## 2023-06-27 NOTE — PHYSICAL EXAM
[Normal] : normal rate, regular rhythm, normal S1 and S2 and no murmur heard [de-identified] : Swelling of both legs, +Garde 1  pitting edema. [de-identified] : Toenails are thick yellowish in color

## 2023-06-27 NOTE — ASSESSMENT
[FreeTextEntry1] : Hyperlipidemia:\par  I had prescribed atorvastatin 10 mg.  Family stated patient is refusing to take medication.\par Advise low-fat diet.\par \par Hypothyroidism:\par Currently euthyroid on levothyroxine 25 mcg\par Ordered thyroid function test.\par \par Bladder tumor:\par Status post surgery.\par had cystoscopy and fulguration of bladder tumors.\par sees Dr Klein.\par \par venous insufficiency , b/l leg edema:\par She have seen surgery.\par Getting dizzy when takes metolazone and furosemide together.  Advised to take furosemide 10 mg 1 a day or 20 mg every other day.  Metolazone 2.5 mg every 3 days 1 tablet.\par Advised to use compressive stocking diligently every day.  Elevation of feet recommended\par \par Hypertension: Within goal currently not on medication.\par \par Fungal infection of toenails: Referred to podiatry.\par \par Anxiety and behavioral changes: Prescribed sertraline 25 mg 1 tablet.\par Follow-up in 2 months.\par

## 2023-06-27 NOTE — HISTORY OF PRESENT ILLNESS
[FreeTextEntry1] : Follow-up on hypertension, swelling of both legs.\par behavior changes. [de-identified] : Ms. SHAW is here today with her daughter-in-law.  Patient cannot speak English history got from family member.\par Her DIL stated she had a TEB with the on call provider few wks ago , as her legs was swollen and she had blisters.\par she was seen by surgery Dr Marquis.\par she is not compliant with compressive stockings.\par when she takes lasix and metolazone , she is getting dizzy.\par c/o changes in her behavior , she us getting irritated , she do not cooperate, she refuses to take medication most days.\par fungal infection of the toe nails.\par Hyperlipidemia patient is refusing to take medication.

## 2023-06-27 NOTE — REVIEW OF SYSTEMS
[Lower Ext Edema] : lower extremity edema [Negative] : Gastrointestinal [Chest Pain] : no chest pain [Leg Claudication] : no leg claudication [Orthopnea] : no orthopnea [FreeTextEntry5] : As HPI

## 2023-08-22 PROBLEM — R60.0 BILATERAL LEG EDEMA: Status: ACTIVE | Noted: 2023-01-01

## 2023-08-22 PROBLEM — E03.9 HYPOTHYROID: Status: ACTIVE | Noted: 2021-12-21

## 2023-08-22 PROBLEM — E87.6 HYPOKALEMIA: Status: ACTIVE | Noted: 2023-01-01

## 2023-08-22 PROBLEM — I10 ESSENTIAL HYPERTENSION: Status: ACTIVE | Noted: 2022-07-06

## 2023-08-22 PROBLEM — I87.2 VENOUS INSUFFICIENCY OF LOWER EXTREMITY: Status: ACTIVE | Noted: 2023-01-01

## 2023-08-28 PROBLEM — F09 COGNITIVE AND NEUROBEHAVIORAL DYSFUNCTION: Status: ACTIVE | Noted: 2023-01-01

## 2023-08-28 PROBLEM — R41.89 COGNITIVE AND BEHAVIORAL CHANGES: Status: ACTIVE | Noted: 2023-01-01

## 2023-08-28 PROBLEM — B35.1 FUNGAL NAIL INFECTION: Status: ACTIVE | Noted: 2023-01-01

## 2023-08-28 NOTE — PHYSICAL EXAM
[Normal] : normal rate, regular rhythm, normal S1 and S2 and no murmur heard [de-identified] : Swelling of both legs, +Garde 1  pitting edema. skin looks erythematous and tight. [de-identified] : Using walker [de-identified] : Toenails are thick yellowish in color, both foot looks dry and scaly

## 2023-08-28 NOTE — HISTORY OF PRESENT ILLNESS
[FreeTextEntry1] : Follow-up on hypertension, swelling of both legs.  [de-identified] : Ms. SHAW is here today with her daughter-in-law.  Patient cannot speak English history got from family member. continues to have b/l leg eldema ,on  lasix and metolazone , seen by surgery. doppler US both legs normal. She is also having cognitive issues, hallucinations, behavioral changes, she refused to start on sertraline which I had prescribed her last visit. She follows with urology for bladder tumor which is currently stable fungal infection of the toe nails.  Getting worse she had not been to the podiatrist Hyperlipidemia patient is refusing to take medication.

## 2023-08-28 NOTE — ASSESSMENT
[FreeTextEntry1] : Hyperlipidemia: refused to take medication. Advise low-fat diet.  Hypothyroidism: Currently euthyroid on levothyroxine 25 mcg  Bladder tumor: sees Dr Klein.currently stable.  venous insufficiency , b/l leg edema: She have seen surgery. Trying to manage with   metolazone and furosemide together.  Advised to take furosemide 10 mg 1 a day or 20 mg every other day.  Metolazone 2.5 mg every 3 days 1 tablet. Advised to use compressive stocking diligently every day.  Elevation of feet recommended.  Hypertension: Within goal currently not on medication.  Fungal infection of toenails: Referred to podiatry. foot care advised.  cognitive changes ,Anxiety and behavioral changes: refused to take sertraline. had a long discussion with family.

## 2023-08-28 NOTE — REVIEW OF SYSTEMS
[Lower Ext Edema] : lower extremity edema [Negative] : Gastrointestinal [Chest Pain] : no chest pain [Leg Claudication] : no leg claudication [Orthopnea] : no orthopnea [FreeTextEntry5] : As HPI [FreeTextEntry9] : Fungal infection of all the toenails dry and scaly feet [de-identified] : as hpi [de-identified] : Patient is angry and frustrated mood

## 2023-09-14 NOTE — DISCHARGE NOTE NURSING/CASE MANAGEMENT/SOCIAL WORK - NSDCDMENAME_GEN_ALL_CORE_FT
[facility]  Topical Retinoid counseling:  Patient advised to apply a pea-sized amount only at bedtime and wait 30 minutes after washing their face before applying.  If too drying, patient may add a non-comedogenic moisturizer. The patient verbalized understanding of the proper use and possible adverse effects of retinoids.  All of the patient's questions and concerns were addressed.

## 2023-11-10 NOTE — ED PROVIDER NOTE - OBJECTIVE STATEMENT
89 yo female w/PMHx hypothyroid and HLD presents to the ED BIBEMS from home s/p mechanical fall. Pt fell into a cabinet and then onto the floor. No LOC. No AC. Unwitnessed fall, son suspects she was reaching for the light and then she might have lost her footing and fell. Son heard a scream when the pt fell and found the pt face down and twisted up on the ground. Pt c/o L sided abdominal pain/rib pain and R elbow pain. Pt unable to stand up on her own after the fall. Pt likely not dizzy prior to the fall. Pt baseline ambulatory with a walker. Family at bedside acting as transaltor. Pt with chronic ankle edema per family. Dr. Rodriguez pt's  PCP.

## 2023-11-10 NOTE — ED ADULT NURSE NOTE - NS ED NURSE TRANSPORT WITH
Carac Counseling:  I discussed with the patient the risks of Carac including but not limited to erythema, scaling, itching, weeping, crusting, and pain. Cardiac Monitor/Defib/ACLS/Rescue Kit/O2/BVM

## 2023-11-10 NOTE — PHARMACOTHERAPY INTERVENTION NOTE - COMMENTS
Medication reconciliation completed.  Patient was unable to provide medication information, spoke to son/HCP Bill at bedside and they provided current medication list; confirmed with Dr. [facility].

## 2023-11-10 NOTE — ED PROVIDER NOTE - SHIFT CHANGE DETAILS
pt signed out to Dr. Vizcaino pending repeat trop and reassess. Avery Mauricio M.D., Attending Physician

## 2023-11-10 NOTE — H&P ADULT - HISTORY OF PRESENT ILLNESS
Pt  denies cpain/SOB/palpitations,  no n/v/d,  no fever, chills, no urinary or resp complaints.   She c/o some pain in her ribs.     Pt is a pleasant 89 yo female w/PMHx hypothyroidism,  HLD who presented  to [facility]  ED via EMS from home after a  mechanical fall. Per son in the ED, pt may have been  applying ointment to her feet when she fell into a cabinet and then onto the floor.   There was no LOC, and it was an  unwitnessed fall.    Her son heard pt  scream when she  fell.  She was  found face down and trying to get up from the ground.  She reported  L sided abdominal pain/rib pain and R elbow pain.   She is  at baseline ambulatory with a walker.    Pt's  son , Fei at bedside reports pt has been dizzy lately and holding the walls at times.   She has chronic ankle edema per family and takes lasix on alternate days.       Pt  denies cpain/SOB/palpitations,  no n/v/d,  no fever, chills, no urinary or resp complaints.   She c/o some pain in her ribs.     Pt is a pleasant 89 yo female w/PMHx hypothyroidism,  HLD who presented  to [facility] [facility]  ED via EMS from home after a  mechanical fall. Per son in the ED, pt may have been  applying ointment to her feet when she fell into a cabinet and then onto the floor.   There was no LOC, and it was an  unwitnessed fall.    Her son heard pt  scream when she  fell.  She was  found face down and trying to get up from the ground.  She reported  L sided abdominal pain/rib pain and R elbow pain.   She is  at baseline ambulatory with a walker.    Pt's  son , Fei at bedside reports pt has been dizzy lately and holding the walls at times.   She has chronic ankle edema per family and takes lasix on alternate days.       Pt  denies cpain/SOB/palpitations,  no n/v/d,  no fever, chills, no urinary or resp complaints.   She c/o some pain in her ribs and mold sore throat.

## 2023-11-10 NOTE — ED PROVIDER NOTE - NS_ ATTENDINGSCRIBEDETAILS _ED_A_ED_FT
I, Avery Mauricio MD,  performed the initial face to face bedside interview with this patient regarding history of present illness, review of symptoms and relevant past medical, social and family history.  I completed an independent physical examination.  I was the initial provider who evaluated this patient.   I personally saw the patient and performed a substantive portion of the visit including all aspects of the medical decision making.  The history, relevant review of systems, past medical and surgical history, medical decision making, and physical examination was documented by the scribe in my presence and I attest to the accuracy of the documentation.

## 2023-11-10 NOTE — H&P ADULT - NSHPPHYSICALEXAM_GEN_ALL_CORE
ICU Vital Signs Last 24 Hrs  T(C): 36.9 (10 Nov 2023 10:24), Max: 36.9 (10 Nov 2023 10:24)  T(F): 98.5 (10 Nov 2023 10:24), Max: 98.5 (10 Nov 2023 10:24)  HR: 87 (10 Nov 2023 14:26) (87 - 89)  BP: 107/70 (10 Nov 2023 14:26) (107/70 - 120/65)    RR: 17 (10 Nov 2023 14:26) (17 - 18)  SpO2: 93% (10 Nov 2023 14:26) (93% - 95%)    O2 Parameters below as of 10 Nov 2023 14:26  Patient On (Oxygen Delivery Method): room air

## 2023-11-10 NOTE — ED PROVIDER NOTE - NS ED ROS FT
Constitutional: No fever or chills  Eyes: No visual changes  HEENT: No throat pain  CV: No chest pain  Resp: No SOB no cough  GI: No abd pain, nausea or vomiting  : No dysuria  MSK: +musculoskeletal pain  Skin: No rash  Neuro: No headache

## 2023-11-10 NOTE — ED PROVIDER NOTE - PHYSICAL EXAMINATION
Constitutional: NAD AAOx3  Eyes: PERRLA EOMI  Head: Normocephalic atraumatic, swelling to nasal bridge and dried blood at the beginning of the nose   ENT: No baxter sign, no raccoon eyes, no hemotympanum, no csf rhinorrhea, no nasal septal hematoma  Mouth: MMM  Cardiac: regular rate   Resp: unlabored breathing  GI: Abd s/nd, ttp epigastric region  Neuro: grossly normal and intact GCS 15 no neuro deficits  Skin: No rashes, no bruising to chest, back, abdomen   Msk: No midline spinal ttp, full ROM of neck, c-collar cleared clinically and with provocative testing, no ttp of facial bones, pelvis stable, full ROM of all extremities without any ttp of extremities, ttp anterior chest wall, swelling and bruising to right elbow Constitutional: NAD   Eyes: PERRLA EOMI  Head: Normocephalic atraumatic, swelling to nasal bridge and dried blood at the beginning of the nose   ENT: No baxter sign, no raccoon eyes,  no csf rhinorrhea, no nasal septal hematoma  Mouth: MMM  Cardiac: regular rate   Resp: unlabored breathing clear b/l  GI: Abd s/nd, ttp epigastric region  Neuro: grossly normal and intact GCS 15 no neuro deficits  Skin: No rashes, no bruising to chest, back, abdomen   Msk: No midline spinal ttp, pelvis stable, ttp anterior chest wall, swelling and bruising to right elbow

## 2023-11-10 NOTE — ED ADULT TRIAGE NOTE - CHIEF COMPLAINT QUOTE
Pt brought in by ambulance from home s/p mechanical fall. Pt fell and hit into the cabinet and then hit the floor. No LOC. No AC. Pt with dried blood on her nose. No active bleeding at this time. Pt speaks Algerian.  Two sons at the bedside translating for her and assisting as needed.

## 2023-11-10 NOTE — H&P ADULT - NSHPLABSRESULTS_GEN_ALL_CORE
my interpretation  EKG:  Sinus rhythm, 87 bpm, pvcs, sinus arrythmia,  qs in v1-v3 (old)      < from: 12 Lead ECG (01.09.22 @ 08:43) >    Diagnosis Line Sinus rhythm with 1st degree A-V block  Anterior infarct , age undetermined  Abnormal ECG  When compared with ECG of 05-JAN-2022 16:58,  Aberrant conduction is no longer Present  Nonspecific T wave abnormality no longer evident in Lateral leads  Confirmed by MALACHI GUIDO,JAMSHID ([id number]) on 1/10/2022 11:17:03 AM    < end of copied text >

## 2023-11-10 NOTE — H&P ADULT - REASON FOR ADMISSION
Troponin elevation  Fall  Presyncope  Dizziness Troponin elevation  Fall  Presyncope  Dizziness  Dehydration

## 2023-11-10 NOTE — PATIENT PROFILE ADULT - FUNCTIONAL SCREEN CURRENT LEVEL: COMMUNICATION, MLM
Patient completed 5th grade in elementary school./3 = unable to understand (not related to language barrier)

## 2023-11-10 NOTE — H&P ADULT - ASSESSMENT
Pt is adm w/    Fall  Dizziness  Rib pain  Elevated troponin, 75, 86  DDX ACS, Ischemic demand, valvular heart disease, arrythmia  Hypokalemia  Dehydration  Hx of lower extremity edema  Hx of Hypothyroidism    - EKG no acute findings  - adm to tele  - check orthostatic vitals  - s/p 1 L NS in the ED  - echo  - hold diuretics  - serial troponins and EKG  - cardiology consult Dr. Cannon  - cont synthroid   - Phys therapy  - DVT proph  lovenox  - Full Code Pt is adm w/    Fall  Dizziness, presyncope  Rib pain  Elevated troponins, 75, and 86  DDX ACS, Ischemic demand, valvular heart disease, arrythmia  Hypokalemia  Dehydration  Hx of lower extremity edema  Hx of Hypothyroidism    - EKG no acute findings  - add ASA 81mg  - adm to tele  - check orthostatic vitals  - s/p 1 L NS in the ED  - echo  - hold diuretics  - serial troponins and EKG  - cardiology consult Dr. Cannon  - cont synthroid   - Phys therapy  - DVT proph  lovenox  - Full Code

## 2023-11-10 NOTE — ED ADULT NURSE NOTE - CHIEF COMPLAINT QUOTE
Pt brought in by ambulance from home s/p mechanical fall. Pt fell and hit into the cabinet and then hit the floor. No LOC. No AC. Pt with dried blood on her nose. No active bleeding at this time. Pt speaks Uzbek.  Two sons at the bedside translating for her and assisting as needed.

## 2023-11-10 NOTE — ED PROVIDER NOTE - ATTENDING APP SHARED VISIT CONTRIBUTION OF CARE
I, Avery Mauricio MD, personally saw the patient with ACP.  I have personally performed a face to face diagnostic evaluation on this patient.  I have reviewed the ACP note and agree with the history, exam, and plan of care, except as noted.   The initial assessment was performed by myself and then the patient was handed off to the ACP. The patient was followed and re-evaluated by the ACP. All labs, imaging and procedures were evaluated and performed by the ACP and I was available for consultation if any questions in the patients care came up.

## 2023-11-10 NOTE — ED PROVIDER NOTE - CLINICAL SUMMARY MEDICAL DECISION MAKING FREE TEXT BOX
89 yo female presents to the ED s/p unwitnessed fall, likely slip and fall, family was home and heard her fall and immediately went upstairs. Will work up to r/o traumatic injury and reassess. 89 yo female presents to the ED s/p unwitnessed fall, likely slip and fall, family was home and heard her fall and immediately went upstairs. Will work up to r/o traumatic injury and reassess.  All labs imaging reviewed by myself.  CT without any traumatic injury troponin elevated to 75.  Patient tachycardic to the 120s.  Patient still feeling dizzy and lightheaded Case discussed with the hospitalist accepts.

## 2023-11-10 NOTE — ED ADULT NURSE REASSESSMENT NOTE - NS ED NURSE REASSESS COMMENT FT1
Patient eating at this time, plan of care discussed with patient and family at bedside. Ambulation trial after patient eats.

## 2023-11-10 NOTE — ED ADULT NURSE REASSESSMENT NOTE - NS ED NURSE REASSESS COMMENT FT1
Assumed care of patient from [facility] at 16:30. Patient AxOx3 (person, place, situation), in no acute distress. VS stable. Son at bedside. Patient is primarily Lao speaking, however, can make needs known in English. Son at bedside and serving as  as patient refusing  services at present. Comfort and safety measures maintained, side rails up. All needs addressed. Updated on plan of care, all questions and concerns addressed. Will continue to monitor.

## 2023-11-10 NOTE — ED ADULT NURSE NOTE - OBJECTIVE STATEMENT
Patient presents to the ER with complaints of mechanical fall, -LOC, -blood thinner. Patient speaks Estonian, son Fei HCP at bedside requesting to translate. Per Bill, patient complained of pain to nose, dried blood noted, pain to abdomen under ribs. Patient with ecchymosis to right elbow, and bilateral lower extremity edema, on Lasix and Metolazone.) Patient able to move all extremities. Patient ambulates with a walker at home, and per family will let us know when she has to urinate for sample.

## 2023-11-11 NOTE — CONSULT NOTE ADULT - ASSESSMENT
Elderly female  S/P fall  No syncope  Overall looks dehydrated and confused  Stable from cardiac point of view, no CP, np SOB, no arrhythmias  Echo reviewd  Hydrate  Possible DC tomorrow, hoping mental status gets better  Will follow

## 2023-11-11 NOTE — PROGRESS NOTE ADULT - ASSESSMENT
89 yo female w/PMHx hypothyroidism, HLD who presented  to [facility]  ED via EMS from home after a mechanical fall. Admitted for syncope rule out    #Fall  #Dizziness, presyncope  #Rib pain  #Elevated troponin 75, and 86  Unclear if fall was mechanical or cardiac related. Patient lives alone with her son living upstairs.   - Infectious workup negative (U/A negative, RVP negative, imaging unremarkable)   - Trops downtrending  - EKG no acute changes  - Cardiology following  - Echo ordered for today  - Unable to provide orthostatics as pt wasn't standing, will follow up   - Monitor on tele  - PT evaluation    #Hypokalemia  - Replete    #Hx of Hypothyroidism  - Added TSH level to AM labs, follow up  - Continue Synthroid     - DVT ppx: Lovenox  - Full Code 89 yo female w/PMHx hypothyroidism, HLD who presented  to [facility]  ED via EMS from home after a mechanical fall. Admitted for syncope rule out    #Fall  #Dizziness, presyncope  #Rib pain  #Elevated troponin 75, and 86  Unclear if fall was mechanical or cardiac related. Patient lives alone with her son living upstairs.   - Infectious workup negative (U/A negative, RVP negative, imaging unremarkable)   - Trops downtrending  - EKG no acute changes  - Cardiology following  - Echo ordered for today  - Unable to provide orthostatics as pt wasn't standing, will follow up   - Monitor on tele  - Started fluids NS @ 75 cc/hr as pt looks dehydrated on exam   - Tylenol, Lidocaine patch for rib pain  - PT evaluation    #Hypokalemia  - Replete    #Hx of Hypothyroidism  - Added TSH level to AM labs, follow up  - Continue Synthroid     - DVT ppx: Lovenox  - Full Code

## 2023-11-11 NOTE — CONSULT NOTE ADULT - SUBJECTIVE AND OBJECTIVE BOX
Patient is a 89y old  Female who presents with a chief complaint of Troponin elevation  Fall  Presyncope  Dizziness  Dehydration (10 Nov 2023 19:13)      HPI:    Pt is a pleasant 89 yo female w/PMHx hypothyroidism,  HLD who presented  to [facility]  ED via EMS from home after a  mechanical fall. Per son in the ED, pt may have been  applying ointment to her feet when she fell into a cabinet and then onto the floor.   There was no LOC, and it was an  unwitnessed fall.    Her son heard pt  scream when she  fell.  She was  found face down and trying to get up from the ground.  She reported  L sided abdominal pain/rib pain and R elbow pain.   She is  at baseline ambulatory with a walker.    Pt's  son , Fei at bedside reports pt has been dizzy lately and holding the walls at times.   She has chronic ankle edema per family and takes lasix on alternate days.       Pt  denies cpain/SOB/palpitations,  no n/v/d,  no fever, chills, no urinary or resp complaints.   She c/o some pain in her ribs and mold sore throat.   Examined at bedsite, more confused than normal  No specific complaints   (10 Nov 2023 19:13)      PAST MEDICAL & SURGICAL HISTORY:  HLD (hyperlipidemia)      Malignant neoplasm of bladder      Macular degeneration      Hypothyroid      No significant past surgical history          HPI:                PREVIOUS DIAGNOSTIC TESTING:      ECHO  FINDINGS:    STRESS  FINDINGS:    CATHETERIZATION  FINDINGS:    MEDICATIONS  (STANDING):  aspirin enteric coated 81 milliGRAM(s) Oral daily  enoxaparin Injectable 40 milliGRAM(s) SubCutaneous every 24 hours  levothyroxine 25 MICROGram(s) Oral daily  lidocaine   4% Patch 1 Patch Transdermal daily    MEDICATIONS  (PRN):  acetaminophen     Tablet .. 650 milliGRAM(s) Oral every 6 hours PRN Temp greater or equal to 38C (100.4F), Mild Pain (1 - 3)      FAMILY HISTORY:  Family history unobtainable due to patient's condition        SOCIAL HISTORY:    CIGARETTES:    ALCOHOL:          Vital Signs Last 24 Hrs  T(C): 37.1 (11 Nov 2023 09:19), Max: 37.8 (10 Nov 2023 17:15)  T(F): 98.8 (11 Nov 2023 09:19), Max: 100 (10 Nov 2023 17:15)  HR: 68 (11 Nov 2023 09:19) (68 - 109)  BP: 145/79 (11 Nov 2023 09:19) (107/70 - 145/79)  BP(mean): 91 (11 Nov 2023 09:19) (91 - 91)  RR: 18 (11 Nov 2023 09:19) (17 - 18)  SpO2: 97% (11 Nov 2023 09:19) (93% - 98%)    Parameters below as of 11 Nov 2023 09:19  Patient On (Oxygen Delivery Method): room air        PHYSICAL EXAM-    Constitutional: The patient appears to be dehydrated, looks weak, not fully alert    Head: Head is normocephalic and atraumatic.      Neck: The patient's neck is supple without enlargement, has no palpable thyromegaly nor thyroid nodules and has no jugular venous distention. No audible carotid bruits. There are strong carotid pulses bilaterally. No JVD.     Cardiovascular: Irregular rate and rhythm without S3, S4. No murmurs or rubs are appreciated.      Respiratory: The patient has no rales and no rhonchi. The patient has no wheezes.     Abdomen: Soft, nontender, nondistended with positive bowel sounds.      Extremity: Mild chronic venous changes  .      INTERPRETATION OF TELEMETRY:    Tele: sinus , 1st degree AV block, no mariano tachy arrhythmias    I&O's Detail      LABS:                        11.0   8.05  )-----------( 247      ( 11 Nov 2023 07:09 )             34.3     11-11    145  |  110<H>  |  18  ----------------------------<  112<H>  3.4<L>   |  32<H>  |  0.68    Ca    8.8      11 Nov 2023 07:09    TPro  6.9  /  Alb  3.2<L>  /  TBili  0.5  /  DBili  x   /  AST  34  /  ALT  26  /  AlkPhos  72  11-10          Urinalysis Basic - ( 11 Nov 2023 07:09 )    Color: x / Appearance: x / SG: x / pH: x  Gluc: 112 mg/dL / Ketone: x  / Bili: x / Urobili: x   Blood: x / Protein: x / Nitrite: x   Leuk Esterase: x / RBC: x / WBC x   Sq Epi: x / Non Sq Epi: x / Bacteria: x      I&O's Summary    BNP  RADIOLOGY & ADDITIONAL STUDIES:

## 2023-11-11 NOTE — PROGRESS NOTE ADULT - SUBJECTIVE AND OBJECTIVE BOX
HOSPITALIST PROGRESS NOTE    SUBJECTIVE / INTERVAL HPI: Patient seen and examined at bedside. Both Sons at bedside as well. They report pt was complaining of generalized pain likely from the fall. In addition, her throat is dry/sore. Family is thinking about possible [facility] vs home with care upon discharge since patient lives alone. PT eval pending.     VITAL SIGNS:  Vital Signs Last 24 Hrs  T(C): 37.1 (11 Nov 2023 09:19), Max: 37.8 (10 Nov 2023 17:15)  T(F): 98.8 (11 Nov 2023 09:19), Max: 100 (10 Nov 2023 17:15)  HR: 68 (11 Nov 2023 09:19) (68 - 109)  BP: 145/79 (11 Nov 2023 09:19) (107/70 - 145/79)  BP(mean): 91 (11 Nov 2023 09:19) (91 - 91)  RR: 18 (11 Nov 2023 09:19) (17 - 18)  SpO2: 97% (11 Nov 2023 09:19) (93% - 98%)    Parameters below as of 11 Nov 2023 09:19  Patient On (Oxygen Delivery Method): room air      PHYSICAL EXAM:  General: Elderly, non-toxic   HEENT: NC/AT; PERRL, anicteric sclera; Dry MM  Neck: Supple, no JVD  Cardiovascular: +S1/S2, RRR, no murmurs, rubs, gallops  Respiratory: CTA B/L; no W/R/R  Gastrointestinal: Slightly distended, soft, non-tender; +BSx4  Extremities: WWP; +chronic venous stasis changes   Vascular: 2+ radial, DP/PT pulses B/L  Neurological: Slightly confused per Family, moving all extremities    MEDICATIONS:  MEDICATIONS  (STANDING):  aspirin enteric coated 81 milliGRAM(s) Oral daily  enoxaparin Injectable 40 milliGRAM(s) SubCutaneous every 24 hours  levothyroxine 25 MICROGram(s) Oral daily  lidocaine   4% Patch 1 Patch Transdermal daily    MEDICATIONS  (PRN):  acetaminophen     Tablet .. 650 milliGRAM(s) Oral every 6 hours PRN Temp greater or equal to 38C (100.4F), Mild Pain (1 - 3)      ALLERGIES:  Allergies    No Known Allergies    Intolerances      LABS:                        11.0   8.05  )-----------( 247      ( 11 Nov 2023 07:09 )             34.3     11-11    145  |  110<H>  |  18  ----------------------------<  112<H>  3.4<L>   |  32<H>  |  0.68    Ca    8.8      11 Nov 2023 07:09    TPro  6.9  /  Alb  3.2<L>  /  TBili  0.5  /  DBili  x   /  AST  34  /  ALT  26  /  AlkPhos  72  11-10      Urinalysis Basic - ( 11 Nov 2023 07:09 )    Color: x / Appearance: x / SG: x / pH: x  Gluc: 112 mg/dL / Ketone: x  / Bili: x / Urobili: x   Blood: x / Protein: x / Nitrite: x   Leuk Esterase: x / RBC: x / WBC x   Sq Epi: x / Non Sq Epi: x / Bacteria: x      CAPILLARY BLOOD GLUCOSE        RADIOLOGY & ADDITIONAL TESTS: Reviewed.

## 2023-11-12 NOTE — PHYSICAL THERAPY INITIAL EVALUATION ADULT - GENERAL OBSERVATIONS, REHAB EVAL
pt received sitting in the bedside chair on 3N.  pt pleasant and cooperative with PT. pt primarily speaks Mohawk, Yakut, and [place] Islander, however speaks and understands some English.  BP closely monitored, orthostatics were negative.  pt ambulated to/from the bathroom.  post session pt left sitting in bedside chair, CBWR, chair alarm on, oriented to call bell system.

## 2023-11-12 NOTE — PROGRESS NOTE ADULT - SUBJECTIVE AND OBJECTIVE BOX
HOSPITALIST PROGRESS NOTE    SUBJECTIVE / INTERVAL HPI: Patient seen and examined at bedside.     VITAL SIGNS:  Vital Signs Last 24 Hrs  T(C): 36.8 (12 Nov 2023 15:18), Max: 37.1 (11 Nov 2023 16:40)  T(F): 98.3 (12 Nov 2023 15:18), Max: 98.7 (11 Nov 2023 16:40)  HR: 85 (12 Nov 2023 15:18) (79 - 96)  BP: 118/65 (12 Nov 2023 15:18) (118/65 - 152/67)  BP(mean): 91 (12 Nov 2023 08:40) (91 - 91)  RR: 17 (12 Nov 2023 15:18) (17 - 18)  SpO2: 95% (12 Nov 2023 15:18) (92% - 95%)    Parameters below as of 12 Nov 2023 15:18  Patient On (Oxygen Delivery Method): room air      PHYSICAL EXAM:  General: Elderly, non-toxic   HEENT: NC/AT; PERRL, anicteric sclera; MMM  Neck: Supple, no JVD  Cardiovascular: +S1/S2, RRR, no murmurs, rubs, gallops  Respiratory: CTA B/L; no W/R/R  Gastrointestinal: Soft, non-tender; +BSx4  Extremities: WWP; +chronic venous stasis changes   Vascular: 2+ radial, DP/PT pulses B/L  Neurological: AOx3, no focal deficits     MEDICATIONS:  MEDICATIONS  (STANDING):  aspirin enteric coated 81 milliGRAM(s) Oral daily  enoxaparin Injectable 40 milliGRAM(s) SubCutaneous every 24 hours  levothyroxine 25 MICROGram(s) Oral daily  lidocaine   4% Patch 1 Patch Transdermal daily  sodium chloride 0.9%. 1000 milliLiter(s) (75 mL/Hr) IV Continuous <Continuous>    MEDICATIONS  (PRN):  acetaminophen     Tablet .. 650 milliGRAM(s) Oral every 6 hours PRN Temp greater or equal to 38C (100.4F), Mild Pain (1 - 3)  phenol 1.4% (CHLORASEPTIC) Oral Spray 2 Spray(s) Topical every 4 hours PRN Mouth Care      ALLERGIES:  Allergies    No Known Allergies    Intolerances        LABS:                        10.9   7.95  )-----------( 242      ( 12 Nov 2023 06:49 )             33.6     11-12    142  |  112<H>  |  10  ----------------------------<  91  4.1   |  26  |  0.49<L>    Ca    8.3<L>      12 Nov 2023 06:49  Mg     2.2     11-12        Urinalysis Basic - ( 12 Nov 2023 06:49 )    Color: x / Appearance: x / SG: x / pH: x  Gluc: 91 mg/dL / Ketone: x  / Bili: x / Urobili: x   Blood: x / Protein: x / Nitrite: x   Leuk Esterase: x / RBC: x / WBC x   Sq Epi: x / Non Sq Epi: x / Bacteria: x      CAPILLARY BLOOD GLUCOSE        RADIOLOGY & ADDITIONAL TESTS: Reviewed. HOSPITALIST PROGRESS NOTE    SUBJECTIVE / INTERVAL HPI: Patient seen and examined at bedside. Still with mild rib pain otherwise appears more relaxed and comfortable today.     VITAL SIGNS:  Vital Signs Last 24 Hrs  T(C): 36.8 (12 Nov 2023 15:18), Max: 37.1 (11 Nov 2023 16:40)  T(F): 98.3 (12 Nov 2023 15:18), Max: 98.7 (11 Nov 2023 16:40)  HR: 85 (12 Nov 2023 15:18) (79 - 96)  BP: 118/65 (12 Nov 2023 15:18) (118/65 - 152/67)  BP(mean): 91 (12 Nov 2023 08:40) (91 - 91)  RR: 17 (12 Nov 2023 15:18) (17 - 18)  SpO2: 95% (12 Nov 2023 15:18) (92% - 95%)    Parameters below as of 12 Nov 2023 15:18  Patient On (Oxygen Delivery Method): room air      PHYSICAL EXAM:  General: Elderly, non-toxic   HEENT: NC/AT; PERRL, anicteric sclera; MMM  Neck: Supple, no JVD  Cardiovascular: +S1/S2, RRR, no murmurs, rubs, gallops  Respiratory: CTA B/L; no W/R/R  Gastrointestinal: Soft, non-tender; +BSx4  Extremities: WWP; +chronic venous stasis changes   Vascular: 2+ radial, DP/PT pulses B/L  Neurological: AOx3, no focal deficits     MEDICATIONS:  MEDICATIONS  (STANDING):  aspirin enteric coated 81 milliGRAM(s) Oral daily  enoxaparin Injectable 40 milliGRAM(s) SubCutaneous every 24 hours  levothyroxine 25 MICROGram(s) Oral daily  lidocaine   4% Patch 1 Patch Transdermal daily  sodium chloride 0.9%. 1000 milliLiter(s) (75 mL/Hr) IV Continuous <Continuous>    MEDICATIONS  (PRN):  acetaminophen     Tablet .. 650 milliGRAM(s) Oral every 6 hours PRN Temp greater or equal to 38C (100.4F), Mild Pain (1 - 3)  phenol 1.4% (CHLORASEPTIC) Oral Spray 2 Spray(s) Topical every 4 hours PRN Mouth Care      ALLERGIES:  Allergies    No Known Allergies    Intolerances        LABS:                        10.9   7.95  )-----------( 242      ( 12 Nov 2023 06:49 )             33.6     11-12    142  |  112<H>  |  10  ----------------------------<  91  4.1   |  26  |  0.49<L>    Ca    8.3<L>      12 Nov 2023 06:49  Mg     2.2     11-12        Urinalysis Basic - ( 12 Nov 2023 06:49 )    Color: x / Appearance: x / SG: x / pH: x  Gluc: 91 mg/dL / Ketone: x  / Bili: x / Urobili: x   Blood: x / Protein: x / Nitrite: x   Leuk Esterase: x / RBC: x / WBC x   Sq Epi: x / Non Sq Epi: x / Bacteria: x      CAPILLARY BLOOD GLUCOSE        RADIOLOGY & ADDITIONAL TESTS: Reviewed. HOSPITALIST PROGRESS NOTE    SUBJECTIVE / INTERVAL HPI: Patient seen and examined at bedside. Still with mild rib pain otherwise appears more relaxed and comfortable today.     VITAL SIGNS:  Vital Signs Last 24 Hrs  T(C): 36.8 (12 Nov 2023 15:18), Max: 37.1 (11 Nov 2023 16:40)  T(F): 98.3 (12 Nov 2023 15:18), Max: 98.7 (11 Nov 2023 16:40)  HR: 85 (12 Nov 2023 15:18) (79 - 96)  BP: 118/65 (12 Nov 2023 15:18) (118/65 - 152/67)  BP(mean): 91 (12 Nov 2023 08:40) (91 - 91)  RR: 17 (12 Nov 2023 15:18) (17 - 18)  SpO2: 95% (12 Nov 2023 15:18) (92% - 95%)    Parameters below as of 12 Nov 2023 15:18  Patient On (Oxygen Delivery Method): room air    PHYSICAL EXAM:  General: Elderly, non-toxic   HEENT: NC/AT; PERRL, anicteric sclera; MMM  Neck: Supple, no JVD  Cardiovascular: +S1/S2, RRR, no murmurs, rubs, gallops  Respiratory: CTA B/L; no W/R/R  Gastrointestinal: Soft, non-tender; +BSx4  Extremities: WWP; +chronic venous stasis changes   Vascular: 2+ radial, DP/PT pulses B/L  Neurological: AOx3, no focal deficits     MEDICATIONS:  MEDICATIONS  (STANDING):  aspirin enteric coated 81 milliGRAM(s) Oral daily  enoxaparin Injectable 40 milliGRAM(s) SubCutaneous every 24 hours  levothyroxine 25 MICROGram(s) Oral daily  lidocaine   4% Patch 1 Patch Transdermal daily  sodium chloride 0.9%. 1000 milliLiter(s) (75 mL/Hr) IV Continuous <Continuous>    MEDICATIONS  (PRN):  acetaminophen     Tablet .. 650 milliGRAM(s) Oral every 6 hours PRN Temp greater or equal to 38C (100.4F), Mild Pain (1 - 3)  phenol 1.4% (CHLORASEPTIC) Oral Spray 2 Spray(s) Topical every 4 hours PRN Mouth Care      ALLERGIES:  Allergies    No Known Allergies    Intolerances        LABS:                        10.9   7.95  )-----------( 242      ( 12 Nov 2023 06:49 )             33.6     11-12    142  |  112<H>  |  10  ----------------------------<  91  4.1   |  26  |  0.49<L>    Ca    8.3<L>      12 Nov 2023 06:49  Mg     2.2     11-12        Urinalysis Basic - ( 12 Nov 2023 06:49 )    Color: x / Appearance: x / SG: x / pH: x  Gluc: 91 mg/dL / Ketone: x  / Bili: x / Urobili: x   Blood: x / Protein: x / Nitrite: x   Leuk Esterase: x / RBC: x / WBC x   Sq Epi: x / Non Sq Epi: x / Bacteria: x      CAPILLARY BLOOD GLUCOSE        RADIOLOGY & ADDITIONAL TESTS: Reviewed.

## 2023-11-13 NOTE — PROGRESS NOTE ADULT - ASSESSMENT
3 yo female w/PMHx hypothyroidism, HLD who presented  to [facility]  [facility] via EMS from home after a mechanical fall. Admitted for syncope rule out    #Fall  #Dizziness, presyncope  #Rib pain  #Elevated troponin 75, and 86  Unclear if fall was mechanical or cardiac related. Patient lives alone with her son living upstairs.   - Infectious workup negative (U/A negative, RVP negative, imaging unremarkable)   - Trops downtrending  - EKG no acute changes  - Cardiology following  - Echo EF 60-65%, no acute findings   - Orthostatics negative   - Started fluids NS @ 75 cc/hr as pt appeared dry, improved today  - Tylenol, Lidocaine patch for rib pain  - PT evaluation, recommended SAMIA today. Follow up with CM/SW tomorrow     #Hx of Hypothyroidism  - TSH normal  - Continue Synthroid     #Chronic LE edema  Home regimen includes Lasix every other day and Metolazone 2.5 on Sat/Sun  - Holding meds as pt is still slightly dry. Restart tomorrow if indicated     DVT ppx: Lovenox  Full Code

## 2023-11-13 NOTE — PROGRESS NOTE ADULT - SUBJECTIVE AND OBJECTIVE BOX
patient seen and examined  reports cough and sore throat and body aches  Review of Systems:  General:denies fever chills, headache, weakness  HEENT: denies blurry vision,diffculty swallowing, difficulty hearing, tinnitus  Cardiovascular: denies chest pain  ,palpitations  Pulmonary:denies shortness of breath, cough, wheezing, hemoptysis  Gastrointestinal: denies abdominal pain, constipation, diarrhea,nausea , vomiting, hematochezia  : denies hematuria, dysuria, or incontinence  Neurological: denies weakness, numbness , tingling, dizziness, tremors  MSK: denies muscle pain, difficulty ambulating, swelling, back pain  skin: denies skin rash, itching, burning, or  skin lesions  Psychiatrical: denies mood disturbances, anxierty, feeling depressed, depression , or difficulty sleeping    Objective:  Vitals  T(C): 36.8 (11-13-23 @ 15:52), Max: 37.2 (11-13-23 @ 08:18)  HR: 81 (11-13-23 @ 15:52) (75 - 87)  BP: 130/66 (11-13-23 @ 15:52) (130/66 - 152/80)  RR: 18 (11-13-23 @ 15:52) (18 - 18)  SpO2: 95% (11-13-23 @ 15:52) (93% - 95%)    Physical Exam:  General: comfortable, no acute distress  HEENT: Atraumatic, no LAD, trachea midline, PERRLA  Cardiovascular: normal s1s2, no murmurs, gallops or fricition rubs  Pulmonary: clear to ausculation Bilaterally, no wheezing , rhonchi  Gastrointestinal: soft non tender non distended, no masses felt, no organomegally  Muscloskeletal: no lower extremity edema, intact bilateral lower extremity pulses  Neurological: CN II-12 intact. No focal weakness  Psychiatrical: normal mood, cooperative  SKIN: no rash, lesions or ulcers    Labs:                          10.9   7.95  )-----------( 242      ( 12 Nov 2023 06:49 )             33.6     11-12    142  |  112<H>  |  10  ----------------------------<  91  4.1   |  26  |  0.49<L>    Ca    8.3<L>      12 Nov 2023 06:49  Mg     2.2     11-12              Active Medications  MEDICATIONS  (STANDING):  aspirin enteric coated 81 milliGRAM(s) Oral daily  enoxaparin Injectable 40 milliGRAM(s) SubCutaneous every 24 hours  levothyroxine 25 MICROGram(s) Oral daily  lidocaine   4% Patch 1 Patch Transdermal daily  sodium chloride 0.9%. 1000 milliLiter(s) (75 mL/Hr) IV Continuous <Continuous>    MEDICATIONS  (PRN):  acetaminophen     Tablet .. 650 milliGRAM(s) Oral every 6 hours PRN Temp greater or equal to 38C (100.4F), Mild Pain (1 - 3)  phenol 1.4% (CHLORASEPTIC) Oral Spray 2 Spray(s) Topical every 4 hours PRN Mouth Care

## 2023-11-14 NOTE — PROGRESS NOTE ADULT - ASSESSMENT
1 yo female w/PMHx hypothyroidism, HLD who presented  to [facility]  [facility] via EMS from home after a mechanical fall. Admitted for syncope rule out    #Fall  #Dizziness, presyncope  #Rib pain  #Elevated troponin 75, and 86  Unclear if fall was mechanical or cardiac related. Patient lives alone with her son living upstairs.   - Infectious workup negative (U/A negative, RVP negative, imaging unremarkable)   - Trops downtrending  - EKG no acute changes  - Cardiology following  - Echo EF 60-65%, no acute findings   - Orthostatics negative   - Started fluids NS @ 75 cc/hr as pt appeared dry, improved today  - Tylenol, Lidocaine patch for rib pain  - PT evaluation, recommended SAMIA : for dc planning     #Hx of Hypothyroidism  - TSH normal  - Continue Synthroid     #Chronic LE edema  Home regimen includes Lasix every other day and Metolazone 2.5 on Sat/Sun  - Holding meds as pt is still slightly dry. will need to assess tomorrow to resume     DVT ppx: Lovenox  Full Code

## 2023-11-14 NOTE — PROGRESS NOTE ADULT - SUBJECTIVE AND OBJECTIVE BOX
CHIEF COMPLAINT: Patient is a 89y old  Female who presents with a chief complaint of Troponin elevation, Fall, Presyncope, Dizziness, Dehydration (13 Nov 2023 17:00)    FROM H&P: Pt is a pleasant 89 yo female w/PMHx hypothyroidism,  HLD who presented  to [facility]  [facility] via EMS from home after a  mechanical fall. Per son in the ED, pt may have been  applying ointment to her feet when she fell into a cabinet and then onto the floor.   There was no LOC, and it was an  unwitnessed fall.    Her son heard pt  scream when she  fell.  She was  found face down and trying to get up from the ground.  She reported  L sided abdominal pain/rib pain and R elbow pain.   She is  at baseline ambulatory with a walker.    Pt's  son , Fei at bedside reports pt has been dizzy lately and holding the walls at times.   She has chronic ankle edema per family and takes lasix on alternate days.   --  Pt  denies cpain/SOB/palpitations,  no n/v/d,  no fever, chills, no urinary or resp complaints.   She c/o some pain in her ribs and mold sore throat.  (10 Nov 2023 19:13)    11/14. patient seen and examined this AM. Pleasantly confused   used: [place]: #[id number]  Reporting abd discomfort, denies CP or SOB    PAST MEDICAL HISTORY:  HLD (hyperlipidemia)   Hypothyroid   Macular degeneration   Malignant neoplasm of bladder.     PAST SURGICAL HISTORY:  No significant past surgical history.     FAMILY HISTORY:  Family history unobtainable due to patient's condition.    SOCIAL HISTORY:  Pt lives with her son's famly.  No tob/ETOH/drug use.    MEDICATIONS  (STANDING):  acetaminophen     Tablet .. 1000 milliGRAM(s) Oral every 8 hours  aspirin enteric coated 81 milliGRAM(s) Oral daily  enoxaparin Injectable 40 milliGRAM(s) SubCutaneous every 24 hours  levothyroxine 25 MICROGram(s) Oral daily  lidocaine   4% Patch 1 Patch Transdermal daily  sodium chloride 0.9%. 1000 milliLiter(s) (75 mL/Hr) IV Continuous <Continuous>    MEDICATIONS  (PRN):  phenol 1.4% (CHLORASEPTIC) Oral Spray 2 Spray(s) Topical every 4 hours PRN Mouth Care  sodium chloride 0.65% Nasal 1 Spray(s) Both Nostrils four times a day PRN Nasal Congestion    HOME MEDICATIONS:  betamethasone dipropionate 0.05% topical cream: Apply topically to affected area 2 times a day , apply to feet (10 Nov 2023 16:18)  furosemide 20 mg oral tablet: 1 tab(s) orally every other day (10 Nov 2023 16:18)  ketoconazole 2% topical cream: Apply topically to affected area 2 times a day , apply to feet (10 Nov 2023 16:18)  levothyroxine 25 mcg (0.025 mg) oral tablet: 1 tab(s) orally once a day (10 Nov 2023 16:19)  metOLazone 2.5 mg oral tablet: 1 tab(s) orally Saturday and Sunday (10 Nov 2023 16:19)    PHYSICAL EXAM:  T(C): 36.7 (14 Nov 2023 09:11), Max: 36.8 (13 Nov 2023 15:52)  T(F): 98 (14 Nov 2023 09:11), Max: 98.3 (13 Nov 2023 15:52)  HR: 74 (14 Nov 2023 09:11) (74 - 86)  BP: 132/83 (14 Nov 2023 09:11) (130/66 - 152/84)  RR: 18 (14 Nov 2023 09:11) (18 - 18)  SpO2: 94% (14 Nov 2023 09:11) (94% - 96%)    Parameters below as of 14 Nov 2023 09:11  Patient On (Oxygen Delivery Method): room air    Constitutional: NAD, awake and alert  HEENT: PERR, EOMI, Normal Hearing, MMM  Neck: Soft and supple, No LAD, No JVD  Respiratory: Breath sounds are clear bilaterally, No wheezing, rales or rhonchi  Cardiovascular: S1 and S2, regular rate and rhythm, no Murmurs, gallops or rubs  Gastrointestinal: Bowel Sounds present, soft, nontender, nondistended, no guarding, no rebound  Extremities: No peripheral edema  Vascular: 2+ peripheral pulses  Neurological: A/O x 2, forgetful/confused  Musculoskeletal: 5/5 strength b/l upper and lower extremities  Skin: No rashes    =======================================    INTERPRETATION OF TELEMETRY: SR 70s    ECG: SR    ========================================    LABS:    CARDIAC TESTING:    < from: TTE Echo Complete w/o Contrast w/ Doppler (11.11.23 @ 09:32) >   The left ventricle is normal in size, wall motion and contractility as   seen in limited views. Estimated left ventricular ejection fraction is   60-65 %.   Mild concentric left ventricular hypertrophy.   Mild diastolic dysfunction (stage I).   Mild to moderate mitral regurgitation.   Aortic valve sclerosis.   Mild to moderate tricuspid valve regurgitation.   Mild pulmonary hypertension    < end of copied text >      RADIOLOGY & ADDITIONAL STUDIES:    < from: Xray Chest 1 View-PORTABLE IMMEDIATE (Xray Chest 1 View-PORTABLE IMMEDIATE .) (11.13.23 @ 14:26) >  No acute pulmonary disease.    < end of copied text >  < from: CT Abdomen and Pelvis w/ IV Cont (11.10.23 @ 11:19) >  No acute traumatic injury.    Thickened endometrium. Recommend nonemergent ultrasound to better   evaluate.    < end of copied text >

## 2023-11-14 NOTE — PROGRESS NOTE ADULT - ASSESSMENT
89 yo female w/PMHx hypothyroidism,  HLD who presented  to [facility]  ED via EMS from home after a  mechanical fall. Per son in the ED, pt may have been  applying ointment to her feet when she fell into a cabinet and then onto the floor.   There was no LOC, and it was an  unwitnessed fall.    Her son heard pt  scream when she  fell.  She was  found face down and trying to get up from the ground.  She reported  L sided abdominal pain/rib pain and R elbow pain.   She is  at baseline ambulatory with a walker.    Pt's  son Fei at bedside reports pt has been dizzy lately and holding the walls at times.   She has chronic ankle edema per family and takes lasix on alternate days.       #Unwitnessed Fall.   #Dizziness/Pre Syncope  #Elevated Troponin  #HLD  #Hypothyroidism, consider checking TFTs    - Monitored on tele, no events  - Troponin elevated and downtrended, Patient denies CP though limited ROS given cognitive impairment.  - EKG without acute ischemic changes  - Infectious w/u negative.   - Echo EF 60-65%, no significant valvulopathy   - Orthostatics negative   - Recommend to resume low dose lasix QOD  - No further testing recommended at this time      Will sign off, call with questions  Case d/w Dr. Espino

## 2023-11-15 NOTE — PROGRESS NOTE ADULT - ASSESSMENT
3 yo female w/PMHx hypothyroidism, HLD who presented  to [facility]  ED via EMS from home after a mechanical fall. Admitted for syncope rule out    #Fall  #Dizziness, presyncope  #Rib pain  #Elevated troponin 75, and 86  Unclear if fall was mechanical or cardiac related. Patient lives alone with her son living upstairs.   - Infectious workup negative (U/A negative, RVP negative, imaging unremarkable)   - Trops downtrending  - EKG no acute changes  - Cardiology following  - Echo EF 60-65%, no acute findings   - Orthostatics negative   - Started fluids NS @ 75 cc/hr as pt appeared dry, improved today  - Tylenol, Lidocaine patch for rib pain  - PT evaluation, recommended SAMIA : for dc planning     #Hx of Hypothyroidism  - TSH normal  - Continue Synthroid     #Chronic LE edema  Home regimen includes Lasix every other day and Metolazone 2.5 on Sat/Sun  - Initially held, resumed PO lasix 20 QOD per cardiology rec , hold metolazone    DVT ppx: Lovenox  Full Code

## 2023-11-15 NOTE — PROGRESS NOTE ADULT - SUBJECTIVE AND OBJECTIVE BOX
CC: S/P FALL    Subjective/interval events:  - No new complaints, awaiting insurance auth for SAMIA  - resumed PO lasix QOD    Review of Systems:  unable to obtain reliable hx given mental status      Physical Exam:  Vital Signs Last 24 Hrs  T(C): 36.7 (15 Nov 2023 09:00), Max: 36.7 (15 Nov 2023 09:00)  T(F): 98.1 (15 Nov 2023 09:00), Max: 98.1 (15 Nov 2023 09:00)  HR: 96 (15 Nov 2023 09:00) (90 - 97)  BP: 113/82 (15 Nov 2023 07:00) (113/82 - 148/75)  BP(mean): --  RR: 18 (15 Nov 2023 09:00) (18 - 20)  SpO2: 99% (15 Nov 2023 09:00) (96% - 99%)    Parameters below as of 15 Nov 2023 09:00  Patient On (Oxygen Delivery Method): room air    General: comfortable, no acute distress  HEENT: Atraumatic, no LAD, trachea midline, PERRLA  Cardiovascular: normal s1s2, no murmurs, gallops or fricition rubs  Pulmonary: clear to ausculation Bilaterally, no wheezing , rhonchi  Gastrointestinal: soft non tender non distended, no masses felt, no organomegally  Muscloskeletal: no lower extremity edema, intact bilateral lower extremity pulses  Neurological: CN II-12 intact. No focal weakness A&O x2, intermittently confused/forgetful  Psychiatrical: normal mood, cooperative  SKIN: no rash, lesions or ulcers    Labs:                          12.5   6.76  )-----------( 312      ( 14 Nov 2023 11:34 )             37.7     11-14    142  |  110<H>  |  15  ----------------------------<  91  4.2   |  26  |  0.56    Ca    9.0      14 Nov 2023 11:34              Active Medications  MEDICATIONS  (STANDING):  acetaminophen     Tablet .. 1000 milliGRAM(s) Oral every 8 hours  aspirin enteric coated 81 milliGRAM(s) Oral daily  enoxaparin Injectable 40 milliGRAM(s) SubCutaneous every 24 hours  levothyroxine 25 MICROGram(s) Oral daily  lidocaine   4% Patch 1 Patch Transdermal daily  sodium chloride 0.9%. 1000 milliLiter(s) (75 mL/Hr) IV Continuous <Continuous>    MEDICATIONS  (PRN):  phenol 1.4% (CHLORASEPTIC) Oral Spray 2 Spray(s) Topical every 4 hours PRN Mouth Care  sodium chloride 0.65% Nasal 1 Spray(s) Both Nostrils four times a day PRN Nasal Congestion

## 2023-11-15 NOTE — PROGRESS NOTE ADULT - REASON FOR ADMISSION
Troponin elevation  Fall  Presyncope  Dizziness  Dehydration

## 2023-11-16 NOTE — DISCHARGE NOTE PROVIDER - HOSPITAL COURSE
Physical Exam:  Vital Signs Last 24 Hrs  T(C): 36.5 (16 Nov 2023 08:30), Max: 36.6 (15 Nov 2023 17:59)  T(F): 97.7 (16 Nov 2023 08:30), Max: 97.8 (15 Nov 2023 17:59)  HR: 83 (16 Nov 2023 08:30) (76 - 83)  BP: 121/70 (16 Nov 2023 08:30) (121/70 - 143/75)  RR: 18 (16 Nov 2023 08:30) (18 - 18)  SpO2: 97% (16 Nov 2023 08:30) (95% - 97%)    Parameters below as of 15 Nov 2023 17:59  Patient On (Oxygen Delivery Method): room air    Constitutional: NAD, awake and alert, intermittently confused  HEENT: PERRLA, EOMI, MMM  Respiratory: Breath sounds are clear bilaterally, No wheezing, rales or rhonchi  Cardiovascular: S1 and S2, RRR, no murmurs, gallops or rubs  Gastrointestinal: +BS, soft, non-tender, non-distended, no CVA tenderness  Extremities: No peripheral edema, +DP pulses b/l  Neurological: A&O x 2, no focal deficits  Musculoskeletal: 5/5 strength b/l upper and lower extremities  Skin: Normal, skin warm and dry    Assessment/Plan:  89 yo female w/PMHx hypothyroidism, HLD, cognitive impairment who presented  to [facility]  [facility] via EMS from home after a mechanical fall. Admitted for syncope rule out    #Fall  #Dizziness, presyncope  #Rib pain  #Elevated troponin 75, and 86  Unclear if fall was mechanical or cardiac related. Patient lives alone with her son living upstairs.   - Infectious workup negative (U/A negative, RVP negative, imaging unremarkable)   - CT head negative  - CT chest/abd/pelvis negative for acute pathology   - Trops minimally elevated, downtrending, likely demand from above   - EKG no acute changes  - Cardiology consult appreciated  - Echo EF 60-65%, no acute findings   - Orthostatics negative   - s/p IVF hydration, appeared dehydrated on admission  - Tylenol, Lidocaine patch for rib pain  - PT evaluation, recommended SAMIA      #Hx of Hypothyroidism  - TSH normal  - Continue Synthroid     #Chronic LE edema  Home regimen includes Lasix every other day and Metolazone 2.5 on Sat/Sun  - Initially held, resumed PO lasix 20 QOD per cardiology rec , stop metolazone    Dispo: discharge to [facility] in [facility] condition    Final diagnosis, treatment plan, and follow-up recommendations were discussed and explained to the patient. The patient was given an opportunity to ask questions concerning the diagnosis and treatment plan. The patient acknowledged understanding of the diagnosis, treatment, and follow-up recommendations. The patient was advised to seek urgent care upon discharge if worsening symptoms develop prior to scheduled follow-up. Time spent on discharge included time with the patient, and also coordinating discharge care as outlined below.    Total time spent: 50 min

## 2023-11-16 NOTE — DISCHARGE NOTE PROVIDER - CARE PROVIDER_API CALL
Elenita Rodriguez  Internal Medicine  [address] [place] [address]  Phone: [phone]  Fax: [phone]  Established Patient  Follow Up Time: 2 weeks    James Cannon  Interventional Cardiology  [address] [place] [address]  Phone: [phone]  Fax: [phone]  Established Patient  Follow Up Time: 2 weeks

## 2023-11-16 NOTE — DISCHARGE NOTE PROVIDER - NSDCMRMEDTOKEN_GEN_ALL_CORE_FT
acetaminophen 500 mg oral tablet: 2 tab(s) orally every 8 hours  betamethasone dipropionate 0.05% topical cream: Apply topically to affected area 2 times a day , apply to feet  furosemide 20 mg oral tablet: 1 tab(s) orally every other day  ketoconazole 2% topical cream: Apply topically to affected area 2 times a day , apply to feet  levothyroxine 25 mcg (0.025 mg) oral tablet: 1 tab(s) orally once a day  lidocaine 4% topical film: Apply topically to affected area once a day  metOLazone 2.5 mg oral tablet: 1 tab(s) orally Saturday and Sunday ***DO NOT TAKE UNTIL TOLD TO DO SO BY OUTPATIENT CARDIOLOGIST/PRESCRIBING PHYSICIAN***  phenol 1.4% topical spray: 2 Apply topically to affected area every 4 hours As needed Mouth Care  sodium chloride 0.65% nasal spray: 2 spray(s) nasal once a day as needed for  congestion

## 2023-11-16 NOTE — DISCHARGE NOTE PROVIDER - NSDCFUSCHEDAPPT_GEN_ALL_CORE_FT
Elenita Rodriguez  [facility]  [facility] [id number]  Scheduled Appointment: 12/08/2023

## 2023-11-16 NOTE — DISCHARGE NOTE PROVIDER - PROVIDER TOKENS
PROVIDER:[TOKEN:[[id number]],FOLLOWUP:[2 weeks],ESTABLISHEDPATIENT:[T]],PROVIDER:[TOKEN:[[id number]],FOLLOWUP:[2 weeks],ESTABLISHEDPATIENT:[T]]

## 2023-11-16 NOTE — DISCHARGE NOTE NURSING/CASE MANAGEMENT/SOCIAL WORK - PATIENT PORTAL LINK FT
You can access the FollowMyHealth Patient Portal offered by [facility] by registering at the following website: [email or web address]. By joining [employer]’s FollowMyHealth portal, you will also be able to view your health information using other applications (apps) compatible with our system.

## 2023-11-16 NOTE — DISCHARGE NOTE PROVIDER - NSDCCPCAREPLAN_GEN_ALL_CORE_FT
PRINCIPAL DISCHARGE DIAGNOSIS  Diagnosis: Fall  Assessment and Plan of Treatment: Likely mechanical fall at home, syncope workup negative in the hospital   Received IV fluid hydration with improvement. Ensure to stay well hydrated at rehab   Fall precautions, physical therapy      SECONDARY DISCHARGE DIAGNOSES  Diagnosis: Dizziness  Assessment and Plan of Treatment:     Diagnosis: Lower extremity edema  Assessment and Plan of Treatment: Continue Lasix 20mg every other day   HOLD TAKING: metolazone on saturday/sundays until told to resume by your outpatient cardiologist (or other prescribing physician)

## 2023-11-16 NOTE — DISCHARGE NOTE NURSING/CASE MANAGEMENT/SOCIAL WORK - NSDCPEFALRISK_GEN_ALL_CORE
For information on Fall & Injury Prevention, visit: [email or web address] OR  [email or web address] OR  https://www.cdc.gov/steadi/patient.html

## 2024-09-05 NOTE — ED PROVIDER NOTE - NS ED SCRIBE STATEMENT
Goal Outcome Evaluation:              Outcome Evaluation: Pt prepositioned diagonally when PT entered room and asleep. Pt more alert this date compared to yesterday therapy session but still disoriented to location and situation. Pt required mod A x 2 for bed mobility, STS, and take side steps to EOB. C/o lethargy and lightheadedness with O2 sat 90% once seated. Caregiver and wife entered the room at the end of treatment session. Rec D/C to home with assistance from wife and caretaker or SNF.      Anticipated Discharge Disposition (PT): home with assist, skilled nursing facility                         Attending

## 2024-09-25 NOTE — PHYSICAL THERAPY INITIAL EVALUATION ADULT - STANDING BALANCE: STATIC
Reviewed provider remarks on lab results from 3/15/24. Reviewed provider remarks and recommendations on colonoscopy from 7/15/24.    Team, please mail a copy of the colonoscopy report from 7/15/24 and the lab results from 3/15/24 (lipid reflex to direct LDL panel and Comprehensive metabolic panel) to the patient. Address has been confirmed. Caesar Garcia RN, BSN      fair minus

## 2024-10-28 NOTE — ED PROVIDER NOTE - PR
Additional Notes: Discussed options of removal and how the scar would be worse if we removed by biopsy.\\nDiscussed ln2, patient elected ilk and ln2
Render Risk Assessment In Note?: no
Detail Level: Simple
1st degree AVB